# Patient Record
Sex: MALE | Race: WHITE | Employment: FULL TIME | URBAN - METROPOLITAN AREA
[De-identification: names, ages, dates, MRNs, and addresses within clinical notes are randomized per-mention and may not be internally consistent; named-entity substitution may affect disease eponyms.]

---

## 2019-10-14 ENCOUNTER — HOSPITAL ENCOUNTER (EMERGENCY)
Age: 59
Discharge: HOME | End: 2019-10-14
Payer: COMMERCIAL

## 2019-10-14 ENCOUNTER — APPOINTMENT (OUTPATIENT)
Dept: CT IMAGING | Age: 59
End: 2019-10-14
Payer: COMMERCIAL

## 2019-10-14 VITALS
SYSTOLIC BLOOD PRESSURE: 172 MMHG | HEART RATE: 64 BPM | WEIGHT: 200 LBS | BODY MASS INDEX: 30.31 KG/M2 | DIASTOLIC BLOOD PRESSURE: 106 MMHG | TEMPERATURE: 97.7 F | RESPIRATION RATE: 18 BRPM | HEIGHT: 68 IN | OXYGEN SATURATION: 96 %

## 2019-10-14 DIAGNOSIS — J32.0 CHRONIC MAXILLARY SINUSITIS: ICD-10-CM

## 2019-10-14 DIAGNOSIS — S09.90XA CLOSED HEAD INJURY, INITIAL ENCOUNTER: Primary | ICD-10-CM

## 2019-10-14 PROCEDURE — 72125 CT NECK SPINE W/O DYE: CPT

## 2019-10-14 PROCEDURE — 70450 CT HEAD/BRAIN W/O DYE: CPT

## 2019-10-14 PROCEDURE — 99284 EMERGENCY DEPT VISIT MOD MDM: CPT

## 2019-10-14 RX ORDER — DOXYCYCLINE 100 MG/1
100 TABLET ORAL 2 TIMES DAILY
Qty: 20 TABLET | Refills: 0 | Status: SHIPPED | OUTPATIENT
Start: 2019-10-14 | End: 2019-10-24

## 2019-10-14 RX ORDER — FLUTICASONE PROPIONATE 50 MCG
1 SPRAY, SUSPENSION (ML) NASAL DAILY
Qty: 1 BOTTLE | Refills: 0 | Status: SHIPPED | OUTPATIENT
Start: 2019-10-14 | End: 2021-08-17

## 2019-10-14 ASSESSMENT — ENCOUNTER SYMPTOMS
SORE THROAT: 0
PHOTOPHOBIA: 0
TROUBLE SWALLOWING: 0
SINUS PRESSURE: 0
ABDOMINAL PAIN: 0
COUGH: 0
VOMITING: 0
SHORTNESS OF BREATH: 0

## 2019-10-14 ASSESSMENT — PAIN DESCRIPTION - PAIN TYPE: TYPE: ACUTE PAIN

## 2019-10-14 ASSESSMENT — PAIN DESCRIPTION - DESCRIPTORS: DESCRIPTORS: HEADACHE

## 2019-10-14 ASSESSMENT — PAIN SCALES - GENERAL: PAINLEVEL_OUTOF10: 6

## 2021-05-18 PROBLEM — I10 ESSENTIAL HYPERTENSION: Status: ACTIVE | Noted: 2018-07-13

## 2021-05-18 PROBLEM — J44.9 COPD (CHRONIC OBSTRUCTIVE PULMONARY DISEASE) (HCC): Status: ACTIVE | Noted: 2018-07-13

## 2021-05-18 PROBLEM — I25.10 ATHSCL HEART DISEASE OF NATIVE CORONARY ARTERY W/O ANG PCTRS: Status: ACTIVE | Noted: 2018-07-13

## 2021-05-20 ENCOUNTER — OFFICE VISIT (OUTPATIENT)
Dept: FAMILY MEDICINE CLINIC | Age: 61
End: 2021-05-20
Payer: COMMERCIAL

## 2021-05-20 VITALS
HEIGHT: 68 IN | SYSTOLIC BLOOD PRESSURE: 130 MMHG | WEIGHT: 203 LBS | TEMPERATURE: 97 F | HEART RATE: 69 BPM | OXYGEN SATURATION: 97 % | BODY MASS INDEX: 30.77 KG/M2 | RESPIRATION RATE: 16 BRPM | DIASTOLIC BLOOD PRESSURE: 80 MMHG

## 2021-05-20 DIAGNOSIS — I10 ESSENTIAL HYPERTENSION: Primary | ICD-10-CM

## 2021-05-20 DIAGNOSIS — K21.9 GASTROESOPHAGEAL REFLUX DISEASE WITHOUT ESOPHAGITIS: ICD-10-CM

## 2021-05-20 DIAGNOSIS — J44.9 CHRONIC OBSTRUCTIVE PULMONARY DISEASE, UNSPECIFIED COPD TYPE (HCC): ICD-10-CM

## 2021-05-20 DIAGNOSIS — I10 ESSENTIAL HYPERTENSION: ICD-10-CM

## 2021-05-20 LAB
ALBUMIN SERPL-MCNC: 4.9 G/DL (ref 3.5–4.6)
ALP BLD-CCNC: 54 U/L (ref 35–104)
ALT SERPL-CCNC: 17 U/L (ref 0–41)
ANION GAP SERPL CALCULATED.3IONS-SCNC: 9 MEQ/L (ref 9–15)
AST SERPL-CCNC: 23 U/L (ref 0–40)
BASOPHILS ABSOLUTE: 0 K/UL (ref 0–0.2)
BASOPHILS RELATIVE PERCENT: 0.4 %
BILIRUB SERPL-MCNC: 0.5 MG/DL (ref 0.2–0.7)
BUN BLDV-MCNC: 16 MG/DL (ref 8–23)
CALCIUM SERPL-MCNC: 9.9 MG/DL (ref 8.5–9.9)
CHLORIDE BLD-SCNC: 103 MEQ/L (ref 95–107)
CHOLESTEROL, TOTAL: 250 MG/DL (ref 0–199)
CO2: 25 MEQ/L (ref 20–31)
CREAT SERPL-MCNC: 0.88 MG/DL (ref 0.7–1.2)
EOSINOPHILS ABSOLUTE: 0.2 K/UL (ref 0–0.7)
EOSINOPHILS RELATIVE PERCENT: 2.2 %
GFR AFRICAN AMERICAN: >60
GFR NON-AFRICAN AMERICAN: >60
GLOBULIN: 2.5 G/DL (ref 2.3–3.5)
GLUCOSE BLD-MCNC: 90 MG/DL (ref 70–99)
HCT VFR BLD CALC: 45.1 % (ref 42–52)
HDLC SERPL-MCNC: 42 MG/DL (ref 40–59)
HEMOGLOBIN: 15.5 G/DL (ref 14–18)
LDL CHOLESTEROL CALCULATED: 135 MG/DL (ref 0–129)
LYMPHOCYTES ABSOLUTE: 3.1 K/UL (ref 1–4.8)
LYMPHOCYTES RELATIVE PERCENT: 36.6 %
MCH RBC QN AUTO: 31.8 PG (ref 27–31.3)
MCHC RBC AUTO-ENTMCNC: 34.3 % (ref 33–37)
MCV RBC AUTO: 92.7 FL (ref 80–100)
MONOCYTES ABSOLUTE: 0.9 K/UL (ref 0.2–0.8)
MONOCYTES RELATIVE PERCENT: 10.3 %
NEUTROPHILS ABSOLUTE: 4.3 K/UL (ref 1.4–6.5)
NEUTROPHILS RELATIVE PERCENT: 50.5 %
PDW BLD-RTO: 13.1 % (ref 11.5–14.5)
PLATELET # BLD: 273 K/UL (ref 130–400)
POTASSIUM SERPL-SCNC: 4.7 MEQ/L (ref 3.4–4.9)
RBC # BLD: 4.87 M/UL (ref 4.7–6.1)
SODIUM BLD-SCNC: 137 MEQ/L (ref 135–144)
TOTAL PROTEIN: 7.4 G/DL (ref 6.3–8)
TRIGL SERPL-MCNC: 365 MG/DL (ref 0–150)
WBC # BLD: 8.5 K/UL (ref 4.8–10.8)

## 2021-05-20 PROCEDURE — G8926 SPIRO NO PERF OR DOC: HCPCS | Performed by: INTERNAL MEDICINE

## 2021-05-20 PROCEDURE — 3017F COLORECTAL CA SCREEN DOC REV: CPT | Performed by: INTERNAL MEDICINE

## 2021-05-20 PROCEDURE — 1036F TOBACCO NON-USER: CPT | Performed by: INTERNAL MEDICINE

## 2021-05-20 PROCEDURE — G8427 DOCREV CUR MEDS BY ELIG CLIN: HCPCS | Performed by: INTERNAL MEDICINE

## 2021-05-20 PROCEDURE — 99214 OFFICE O/P EST MOD 30 MIN: CPT | Performed by: INTERNAL MEDICINE

## 2021-05-20 PROCEDURE — 3023F SPIROM DOC REV: CPT | Performed by: INTERNAL MEDICINE

## 2021-05-20 PROCEDURE — G8417 CALC BMI ABV UP PARAM F/U: HCPCS | Performed by: INTERNAL MEDICINE

## 2021-05-20 SDOH — ECONOMIC STABILITY: FOOD INSECURITY: WITHIN THE PAST 12 MONTHS, THE FOOD YOU BOUGHT JUST DIDN'T LAST AND YOU DIDN'T HAVE MONEY TO GET MORE.: NEVER TRUE

## 2021-05-20 SDOH — ECONOMIC STABILITY: FOOD INSECURITY: WITHIN THE PAST 12 MONTHS, YOU WORRIED THAT YOUR FOOD WOULD RUN OUT BEFORE YOU GOT MONEY TO BUY MORE.: NEVER TRUE

## 2021-05-20 ASSESSMENT — PATIENT HEALTH QUESTIONNAIRE - PHQ9
SUM OF ALL RESPONSES TO PHQ9 QUESTIONS 1 & 2: 0
SUM OF ALL RESPONSES TO PHQ QUESTIONS 1-9: 0
SUM OF ALL RESPONSES TO PHQ QUESTIONS 1-9: 0
1. LITTLE INTEREST OR PLEASURE IN DOING THINGS: 0
SUM OF ALL RESPONSES TO PHQ QUESTIONS 1-9: 0
2. FEELING DOWN, DEPRESSED OR HOPELESS: 0

## 2021-05-20 ASSESSMENT — ENCOUNTER SYMPTOMS
EYE REDNESS: 0
RECTAL PAIN: 0
CONSTIPATION: 0
ABDOMINAL DISTENTION: 0
COLOR CHANGE: 0
EYE DISCHARGE: 0
SORE THROAT: 0
SINUS PRESSURE: 0
TROUBLE SWALLOWING: 0
PHOTOPHOBIA: 0
COUGH: 0
NAUSEA: 0
VOICE CHANGE: 0
VOMITING: 0
EYE ITCHING: 0
SINUS PAIN: 0
EYE PAIN: 0
CHEST TIGHTNESS: 0
BACK PAIN: 0
BLOOD IN STOOL: 0
APNEA: 0
WHEEZING: 0
RHINORRHEA: 0
ABDOMINAL PAIN: 0
SHORTNESS OF BREATH: 0
FACIAL SWELLING: 0
DIARRHEA: 0

## 2021-05-20 NOTE — PROGRESS NOTES
Subjective:      Patient ID: Lizzie He is a 56 y.o. male New patient, here for evaluation of the following chief complaint(s):  No chief complaint on file. HPI  68-year-old male with a history of hypertension established coronary artery disease and COPD presenting to establish continuity with me as his primary care doctor. Abdominal complaints: abdominal cramping post fatty meals. increased belching. Not responsive to zantac or omeprazole. IMproving with Tumeric. Hypertension: Compliant with Hyzaar 100-25 mg daily            Neuropathy: Compliant with Neurontin 300 mg 2 times daily            GERD: Compliant with Prilosec 40 mg orally daily          Cad: pt scheduled for a heart cath. S/p STENT. At present he denies polyuria,  Polydipsia, constitutional, sinus, visual, cardiopulmonary, urologic, gastrointestinal, immunologic/hematologic, musculoskeletal, neurologic,dermatologic, or psychiatric complaints. TAD: Quit smoking      Review of Systems   Constitutional: Negative for chills, diaphoresis, fatigue and fever. HENT: Negative for congestion, dental problem, drooling, ear discharge, ear pain, facial swelling, hearing loss, mouth sores, nosebleeds, postnasal drip, rhinorrhea, sinus pressure, sinus pain, sneezing, sore throat, tinnitus, trouble swallowing and voice change. Eyes: Negative for photophobia, pain, discharge, redness, itching and visual disturbance. Respiratory: Negative for apnea, cough, chest tightness, shortness of breath and wheezing. Cardiovascular: Negative for chest pain, palpitations and leg swelling. Gastrointestinal: Negative for abdominal distention, abdominal pain, blood in stool, constipation, diarrhea, nausea, rectal pain and vomiting. Endocrine: Negative for cold intolerance, heat intolerance, polydipsia, polyphagia and polyuria.    Genitourinary: Negative for decreased urine volume, difficulty urinating, dysuria, flank pain, frequency, genital sores, hematuria and urgency. Musculoskeletal: Negative for arthralgias, back pain, gait problem, joint swelling, myalgias, neck pain and neck stiffness. Skin: Negative for color change, rash and wound. Allergic/Immunologic: Negative for environmental allergies and food allergies. Neurological: Negative for dizziness, tremors, seizures, syncope, facial asymmetry, speech difficulty, weakness, light-headedness, numbness and headaches. Hematological: Negative for adenopathy. Does not bruise/bleed easily. Psychiatric/Behavioral: Negative for agitation, confusion, decreased concentration, hallucinations, self-injury, sleep disturbance and suicidal ideas. The patient is not nervous/anxious. Objective: There were no vitals taken for this visit. Physical Exam  Constitutional:       General: He is not in acute distress. Appearance: He is well-developed. HENT:      Head: Normocephalic. Right Ear: External ear normal.      Left Ear: External ear normal.   Eyes:      Conjunctiva/sclera: Conjunctivae normal.   Neck:      Vascular: No JVD. Trachea: No tracheal deviation. Cardiovascular:      Rate and Rhythm: Normal rate and regular rhythm. Heart sounds: Normal heart sounds. Pulmonary:      Effort: Pulmonary effort is normal. No respiratory distress. Breath sounds: Normal breath sounds. No wheezing or rales. Chest:      Chest wall: No tenderness. Abdominal:      General: Bowel sounds are normal. There is no distension. Palpations: Abdomen is soft. There is no mass. Tenderness: There is no abdominal tenderness. There is no guarding or rebound. Musculoskeletal:         General: No tenderness or deformity. Cervical back: Neck supple. Comments: Third finger amputated   Skin:     General: Skin is warm and dry. Coloration: Skin is not pale. Findings: No erythema or rash.    Neurological:      Mental Status: He is alert and oriented to person, place, and time. Motor: No abnormal muscle tone. Psychiatric:         Thought Content: Thought content normal.         Judgment: Judgment normal.         Assessment:       Diagnosis Orders   1. Essential hypertension     2. Chronic obstructive pulmonary disease, unspecified COPD type ([facility] [address]     3. Gastroesophageal reflux disease without esophagitis           Plan:      Diagnoses and all orders for this visit:    Essential hypertension    Chronic obstructive pulmonary disease, unspecified COPD type ([facility] [address]    Gastroesophageal reflux disease without esophagitis         Return in about 4 months (around 9/20/2021). On this date 05/20/21 I have spent 30 minutes reviewing previous notes, test results and face to face with the patient discussing the diagnosis and importance of compliance with the treatment plan. Kanwal Campa MD    Please note, this report has been partially produced using speech recognition software  and may cause  and /or contain errors related to that system including grammar, punctuation and spelling as well as words and phrases that may seem inappropriate. If there are questions or concerns please feel free to contact me to clarify.

## 2021-08-16 RX ORDER — LISINOPRIL 40 MG/1
TABLET ORAL
COMMUNITY
Start: 2021-06-28 | End: 2021-08-17

## 2021-08-17 ENCOUNTER — OFFICE VISIT (OUTPATIENT)
Dept: FAMILY MEDICINE CLINIC | Age: 61
End: 2021-08-17
Payer: COMMERCIAL

## 2021-08-17 VITALS
HEIGHT: 68 IN | SYSTOLIC BLOOD PRESSURE: 138 MMHG | WEIGHT: 204 LBS | BODY MASS INDEX: 30.92 KG/M2 | OXYGEN SATURATION: 98 % | HEART RATE: 78 BPM | RESPIRATION RATE: 14 BRPM | DIASTOLIC BLOOD PRESSURE: 90 MMHG

## 2021-08-17 DIAGNOSIS — I10 ESSENTIAL HYPERTENSION: Primary | ICD-10-CM

## 2021-08-17 DIAGNOSIS — M79.671 RIGHT FOOT PAIN: ICD-10-CM

## 2021-08-17 DIAGNOSIS — R73.9 HYPERGLYCEMIA: ICD-10-CM

## 2021-08-17 DIAGNOSIS — K21.9 GASTROESOPHAGEAL REFLUX DISEASE WITHOUT ESOPHAGITIS: ICD-10-CM

## 2021-08-17 LAB — HBA1C MFR BLD: 5.3 %

## 2021-08-17 PROCEDURE — 83036 HEMOGLOBIN GLYCOSYLATED A1C: CPT | Performed by: INTERNAL MEDICINE

## 2021-08-17 PROCEDURE — G8427 DOCREV CUR MEDS BY ELIG CLIN: HCPCS | Performed by: INTERNAL MEDICINE

## 2021-08-17 PROCEDURE — 1036F TOBACCO NON-USER: CPT | Performed by: INTERNAL MEDICINE

## 2021-08-17 PROCEDURE — G8417 CALC BMI ABV UP PARAM F/U: HCPCS | Performed by: INTERNAL MEDICINE

## 2021-08-17 PROCEDURE — 3017F COLORECTAL CA SCREEN DOC REV: CPT | Performed by: INTERNAL MEDICINE

## 2021-08-17 PROCEDURE — 99214 OFFICE O/P EST MOD 30 MIN: CPT | Performed by: INTERNAL MEDICINE

## 2021-08-17 RX ORDER — LISINOPRIL 40 MG/1
TABLET ORAL
Qty: 30 TABLET | Refills: 3 | Status: SHIPPED | OUTPATIENT
Start: 2021-08-17 | End: 2022-01-18

## 2021-08-17 ASSESSMENT — ENCOUNTER SYMPTOMS
BLOOD IN STOOL: 0
DIARRHEA: 0
NAUSEA: 0
RECTAL PAIN: 0
VOMITING: 0
SINUS PRESSURE: 0
EYE ITCHING: 0
BACK PAIN: 0
DIFFICULTY BREATHING: 1
TROUBLE SWALLOWING: 0
COLOR CHANGE: 0
EYE DISCHARGE: 0
COUGH: 0
CHEST TIGHTNESS: 0
SINUS PAIN: 0
APNEA: 0
RHINORRHEA: 0
FACIAL SWELLING: 0
EYE REDNESS: 0
ABDOMINAL PAIN: 0
WHEEZING: 0
CONSTIPATION: 0
SORE THROAT: 0
PHOTOPHOBIA: 0
ABDOMINAL DISTENTION: 0
VOICE CHANGE: 0
EYE PAIN: 0
SHORTNESS OF BREATH: 0

## 2021-08-17 NOTE — TELEPHONE ENCOUNTER
The Cre[facility] ordered does not have  complete directions. Is he to take 2 tabs tid for a total of #180 tabs? Please send new order to [facility] in [facility].

## 2021-08-17 NOTE — PROGRESS NOTES
Subjective:      Patient ID: Kalyan Herring is a 59 y.o. male New patient, here for evaluation of the following chief complaint(s):  Chief Complaint   Patient presents with    Breathing Problem     patient states he has been having some breathing issues and has not use an inhaler  in over 15 years but he would like some help       Breathing Problem  He complains of difficulty breathing. There is no cough, shortness of breath or wheezing. Pertinent negatives include no chest pain, ear pain, fever, headaches, myalgias, postnasal drip, rhinorrhea, sneezing, sore throat or trouble swallowing. 26-year-old male with a history of hypertension established coronary artery disease and COPD presenting to establish continuity with me as his primary care doctor. Abdominal complaints: abdominal cramping post fatty meals. increased belching. Improved with administration of  Pancrelipase        Hypertension: Linsinopril 40 nmg daily continue coreg 12.5        Right foot pain for several week. Worse with ambulation 10/10. Neuropathy: Compliant with Neurontin 300 mg 2 times daily            GERD: Compliant with Prilosec 40 mg orally daily          Cad: pt scheduled for a heart cath. S/p STENT. At present he denies polyuria,  Polydipsia, constitutional, sinus, visual, cardiopulmonary, urologic, gastrointestinal, immunologic/hematologic, musculoskeletal, neurologic,dermatologic, or psychiatric complaints. TAD: Quit smoking      Review of Systems   Constitutional: Negative for chills, diaphoresis, fatigue and fever. HENT: Negative for congestion, dental problem, drooling, ear discharge, ear pain, facial swelling, hearing loss, mouth sores, nosebleeds, postnasal drip, rhinorrhea, sinus pressure, sinus pain, sneezing, sore throat, tinnitus, trouble swallowing and voice change. Eyes: Negative for photophobia, pain, discharge, redness, itching and visual disturbance.    Respiratory: Negative for apnea, cough, chest tightness, shortness of breath and wheezing. Cardiovascular: Negative for chest pain, palpitations and leg swelling. Gastrointestinal: Negative for abdominal distention, abdominal pain, blood in stool, constipation, diarrhea, nausea, rectal pain and vomiting. Endocrine: Negative for cold intolerance, heat intolerance, polydipsia, polyphagia and polyuria. Genitourinary: Negative for decreased urine volume, difficulty urinating, dysuria, flank pain, frequency, genital sores, hematuria and urgency. Musculoskeletal: Negative for arthralgias, back pain, gait problem, joint swelling, myalgias, neck pain and neck stiffness. Skin: Negative for color change, rash and wound. Allergic/Immunologic: Negative for environmental allergies and food allergies. Neurological: Negative for dizziness, tremors, seizures, syncope, facial asymmetry, speech difficulty, weakness, light-headedness, numbness and headaches. Hematological: Negative for adenopathy. Does not bruise/bleed easily. Psychiatric/Behavioral: Negative for agitation, confusion, decreased concentration, hallucinations, self-injury, sleep disturbance and suicidal ideas. The patient is not nervous/anxious. Objective:   BP (!) 138/90   Pulse 78   Resp 14   Ht 5' 8\" (1.727 m)   Wt 204 lb (92.5 kg)   SpO2 98%   BMI 31.02 kg/m²     Physical Exam  Constitutional:       General: He is not in acute distress. Appearance: He is well-developed. HENT:      Head: Normocephalic. Right Ear: External ear normal.      Left Ear: External ear normal.   Eyes:      Conjunctiva/sclera: Conjunctivae normal.   Neck:      Vascular: No JVD. Trachea: No tracheal deviation. Cardiovascular:      Rate and Rhythm: Normal rate and regular rhythm. Heart sounds: Normal heart sounds. Pulmonary:      Effort: Pulmonary effort is normal. No respiratory distress. Breath sounds: Normal breath sounds. No wheezing or rales.    Chest: MD    Please note, this report has been partially produced using speech recognition software  and may cause  and /or contain errors related to that system including grammar, punctuation and spelling as well as words and phrases that may seem inappropriate. If there are questions or concerns please feel free to contact me to clarify.

## 2021-08-18 ENCOUNTER — TELEPHONE (OUTPATIENT)
Dept: FAMILY MEDICINE CLINIC | Age: 61
End: 2021-08-18

## 2021-08-18 NOTE — TELEPHONE ENCOUNTER
Pharmacy got the new order for Pancrelipase, but now is says to dispense 900 tabs. They will need another order to [facility], please.

## 2021-08-23 NOTE — TELEPHONE ENCOUNTER
Pharmacy callilng back for RX clarification for COREG. How many capsules should be in dosage 3X daily?       Nino Jones  [phone]

## 2021-08-26 NOTE — TELEPHONE ENCOUNTER
[facility] called and is still waiting on clarification on the directions for Pancrelipase. They just need to know if the patient is to be taking 1 tablet per day or 3 tablets per day. Please advise, thank you.

## 2021-09-07 RX ORDER — PANCRELIPASE 30000; 6000; 19000 [USP'U]/1; [USP'U]/1; [USP'U]/1
CAPSULE, DELAYED RELEASE PELLETS ORAL
COMMUNITY
Start: 2021-08-17

## 2021-09-28 ENCOUNTER — OFFICE VISIT (OUTPATIENT)
Dept: FAMILY MEDICINE CLINIC | Age: 61
End: 2021-09-28
Payer: COMMERCIAL

## 2021-09-28 VITALS
DIASTOLIC BLOOD PRESSURE: 84 MMHG | RESPIRATION RATE: 14 BRPM | HEART RATE: 82 BPM | OXYGEN SATURATION: 96 % | WEIGHT: 204 LBS | HEIGHT: 68 IN | SYSTOLIC BLOOD PRESSURE: 132 MMHG | BODY MASS INDEX: 30.92 KG/M2

## 2021-09-28 DIAGNOSIS — I10 ESSENTIAL HYPERTENSION: Primary | ICD-10-CM

## 2021-09-28 DIAGNOSIS — I25.10 ATHSCL HEART DISEASE OF NATIVE CORONARY ARTERY W/O ANG PCTRS: ICD-10-CM

## 2021-09-28 DIAGNOSIS — K21.9 GASTROESOPHAGEAL REFLUX DISEASE WITHOUT ESOPHAGITIS: ICD-10-CM

## 2021-09-28 DIAGNOSIS — J44.9 CHRONIC OBSTRUCTIVE PULMONARY DISEASE, UNSPECIFIED COPD TYPE (HCC): ICD-10-CM

## 2021-09-28 PROCEDURE — 99214 OFFICE O/P EST MOD 30 MIN: CPT | Performed by: INTERNAL MEDICINE

## 2021-09-28 PROCEDURE — G8427 DOCREV CUR MEDS BY ELIG CLIN: HCPCS | Performed by: INTERNAL MEDICINE

## 2021-09-28 PROCEDURE — 3017F COLORECTAL CA SCREEN DOC REV: CPT | Performed by: INTERNAL MEDICINE

## 2021-09-28 PROCEDURE — G8417 CALC BMI ABV UP PARAM F/U: HCPCS | Performed by: INTERNAL MEDICINE

## 2021-09-28 PROCEDURE — 3023F SPIROM DOC REV: CPT | Performed by: INTERNAL MEDICINE

## 2021-09-28 PROCEDURE — G8926 SPIRO NO PERF OR DOC: HCPCS | Performed by: INTERNAL MEDICINE

## 2021-09-28 PROCEDURE — 1036F TOBACCO NON-USER: CPT | Performed by: INTERNAL MEDICINE

## 2021-09-28 RX ORDER — SUCRALFATE 1 G/1
TABLET ORAL
COMMUNITY
Start: 2021-09-24

## 2021-09-28 RX ORDER — PANTOPRAZOLE SODIUM 40 MG/1
TABLET, DELAYED RELEASE ORAL
COMMUNITY
Start: 2021-09-24

## 2021-09-28 ASSESSMENT — ENCOUNTER SYMPTOMS
BACK PAIN: 0
FACIAL SWELLING: 0
SINUS PAIN: 0
CHEST TIGHTNESS: 0
RECTAL PAIN: 0
NAUSEA: 0
EYE REDNESS: 0
COUGH: 0
BLOOD IN STOOL: 0
SINUS PRESSURE: 0
EYE ITCHING: 0
SORE THROAT: 0
VOICE CHANGE: 0
APNEA: 0
ABDOMINAL DISTENTION: 0
CONSTIPATION: 0
RHINORRHEA: 0
SHORTNESS OF BREATH: 0
TROUBLE SWALLOWING: 0
DIFFICULTY BREATHING: 1
EYE DISCHARGE: 0
EYE PAIN: 0
VOMITING: 0
WHEEZING: 0
DIARRHEA: 0
PHOTOPHOBIA: 0
ABDOMINAL PAIN: 0
COLOR CHANGE: 0

## 2021-09-28 NOTE — PROGRESS NOTES
Subjective:      Patient ID: Cecil Hector is a 59 y.o. male New patient, here for evaluation of the following chief complaint(s):  Chief Complaint   Patient presents with    Hypertension       Breathing Problem  He complains of difficulty breathing. There is no cough, shortness of breath or wheezing. Pertinent negatives include no chest pain, ear pain, fever, headaches, myalgias, postnasal drip, rhinorrhea, sneezing, sore throat or trouble swallowing. 58-year-old male with a history of hypertension established coronary artery disease and COPD presenting to establish continuity with me as his primary care doctor. Abdominal complaints: Underwent upper endoscopy and was found to have gastric ulcers. He is  presently receiving Prilosec 40 mg orally daily. Pancrelipase        Hypertension: Linsinopril 40 nmg daily and coreg 12.5        Right foot pain: Stable. .         Neuropathy: Compliant with Neurontin 300 mg 2 times daily            GERD: Compliant with Prilosec 40 mg orally daily          Cad: pt scheduled for a heart cath. S/p STENT. At present he denies polyuria,  Polydipsia, constitutional, sinus, visual, cardiopulmonary, urologic, gastrointestinal, immunologic/hematologic, musculoskeletal, neurologic,dermatologic, or psychiatric complaints. TAD: Quit smoking      Review of Systems   Constitutional: Negative for chills, diaphoresis, fatigue and fever. HENT: Negative for congestion, dental problem, drooling, ear discharge, ear pain, facial swelling, hearing loss, mouth sores, nosebleeds, postnasal drip, rhinorrhea, sinus pressure, sinus pain, sneezing, sore throat, tinnitus, trouble swallowing and voice change. Eyes: Negative for photophobia, pain, discharge, redness, itching and visual disturbance. Respiratory: Negative for apnea, cough, chest tightness, shortness of breath and wheezing. Cardiovascular: Negative for chest pain, palpitations and leg swelling. Gastrointestinal: Negative for abdominal distention, abdominal pain, blood in stool, constipation, diarrhea, nausea, rectal pain and vomiting. Endocrine: Negative for cold intolerance, heat intolerance, polydipsia, polyphagia and polyuria. Genitourinary: Negative for decreased urine volume, difficulty urinating, dysuria, flank pain, frequency, genital sores, hematuria and urgency. Musculoskeletal: Negative for arthralgias, back pain, gait problem, joint swelling, myalgias, neck pain and neck stiffness. Skin: Negative for color change, rash and wound. Allergic/Immunologic: Negative for environmental allergies and food allergies. Neurological: Negative for dizziness, tremors, seizures, syncope, facial asymmetry, speech difficulty, weakness, light-headedness, numbness and headaches. Hematological: Negative for adenopathy. Does not bruise/bleed easily. Psychiatric/Behavioral: Negative for agitation, confusion, decreased concentration, hallucinations, self-injury, sleep disturbance and suicidal ideas. The patient is not nervous/anxious. Objective:   /84   Pulse 82   Resp 14   Ht 5' 8\" (1.727 m)   Wt 204 lb (92.5 kg)   SpO2 96%   BMI 31.02 kg/m²     Physical Exam  Constitutional:       General: He is not in acute distress. Appearance: He is well-developed. HENT:      Head: Normocephalic. Right Ear: External ear normal.      Left Ear: External ear normal.   Eyes:      Conjunctiva/sclera: Conjunctivae normal.   Neck:      Vascular: No JVD. Trachea: No tracheal deviation. Cardiovascular:      Rate and Rhythm: Normal rate and regular rhythm. Heart sounds: Normal heart sounds. Pulmonary:      Effort: Pulmonary effort is normal. No respiratory distress. Breath sounds: Normal breath sounds. No wheezing or rales. Chest:      Chest wall: No tenderness. Abdominal:      General: Bowel sounds are normal. There is no distension. Palpations: Abdomen is soft. There is no mass. Tenderness: There is no abdominal tenderness. There is no guarding or rebound. Musculoskeletal:         General: No tenderness or deformity. Cervical back: Neck supple. Comments: Third finger amputated   Skin:     General: Skin is warm and dry. Coloration: Skin is not pale. Findings: No erythema or rash. Neurological:      Mental Status: He is alert and oriented to person, place, and time. Motor: No abnormal muscle tone. Psychiatric:         Thought Content: Thought content normal.         Judgment: Judgment normal.         Assessment:       Diagnosis Orders   1. Essential hypertension     2. Gastroesophageal reflux disease without esophagitis     3. Chronic obstructive pulmonary disease, unspecified COPD type ([address]     4. Athscl heart disease of native coronary artery w/o ang pctrs           Plan:      Alisa Seip was seen today for hypertension. Diagnoses and all orders for this visit:    Essential hypertensioncontinue lisinopril 40 mg orally dailycontinue Prilosecstable. Monitoring    Gastroesophageal reflux disease without esophagitis    Chronic obstructive pulmonary disease, unspecified COPD type ([address]    Athscl heart disease of native coronary artery w/o ang pctrscontinue Effient         Return in about 5 months (around 2/28/2022). On this date 09/28/21 I have spent 30 minutes reviewing previous notes, test results and face to face with the patient discussing the diagnosis and importance of compliance with the treatment plan. Trell Hussein MD    Please note, this report has been partially produced using speech recognition software  and may cause  and /or contain errors related to that system including grammar, punctuation and spelling as well as words and phrases that may seem inappropriate. If there are questions or concerns please feel free to contact me to clarify.

## 2022-01-18 ENCOUNTER — APPOINTMENT (OUTPATIENT)
Dept: GENERAL RADIOLOGY | Age: 62
End: 2022-01-18
Payer: COMMERCIAL

## 2022-01-18 ENCOUNTER — HOSPITAL ENCOUNTER (EMERGENCY)
Age: 62
Discharge: HOME | End: 2022-01-18
Payer: COMMERCIAL

## 2022-01-18 VITALS
SYSTOLIC BLOOD PRESSURE: 190 MMHG | HEART RATE: 75 BPM | BODY MASS INDEX: 30.31 KG/M2 | RESPIRATION RATE: 16 BRPM | TEMPERATURE: 97.4 F | WEIGHT: 200 LBS | HEIGHT: 68 IN | OXYGEN SATURATION: 96 % | DIASTOLIC BLOOD PRESSURE: 78 MMHG

## 2022-01-18 DIAGNOSIS — S49.91XA INJURY OF RIGHT SHOULDER, INITIAL ENCOUNTER: Primary | ICD-10-CM

## 2022-01-18 DIAGNOSIS — I10 ESSENTIAL HYPERTENSION: ICD-10-CM

## 2022-01-18 DIAGNOSIS — R10.31 RIGHT GROIN PAIN: ICD-10-CM

## 2022-01-18 LAB
BILIRUBIN URINE: NEGATIVE
BLOOD, URINE: NEGATIVE
CLARITY: CLEAR
COLOR: YELLOW
GLUCOSE URINE: NEGATIVE MG/DL
KETONES, URINE: NEGATIVE MG/DL
LEUKOCYTE ESTERASE, URINE: NEGATIVE
NITRITE, URINE: NEGATIVE
PH UA: 5.5 (ref 5–9)
PROTEIN UA: NEGATIVE MG/DL
SPECIFIC GRAVITY UA: 1.02 (ref 1–1.03)
URINE REFLEX TO CULTURE: NORMAL
UROBILINOGEN, URINE: 0.2 E.U./DL

## 2022-01-18 PROCEDURE — 6370000000 HC RX 637 (ALT 250 FOR IP)

## 2022-01-18 PROCEDURE — 81003 URINALYSIS AUTO W/O SCOPE: CPT

## 2022-01-18 PROCEDURE — 99283 EMERGENCY DEPT VISIT LOW MDM: CPT

## 2022-01-18 PROCEDURE — 73030 X-RAY EXAM OF SHOULDER: CPT

## 2022-01-18 RX ORDER — ACETAMINOPHEN 500 MG
1000 TABLET ORAL ONCE
Status: COMPLETED | OUTPATIENT
Start: 2022-01-18 | End: 2022-01-18

## 2022-01-18 RX ORDER — LOSARTAN POTASSIUM 50 MG/1
50 TABLET ORAL DAILY
COMMUNITY
End: 2022-02-24

## 2022-01-18 RX ADMIN — ACETAMINOPHEN 1000 MG: 500 TABLET ORAL at 16:43

## 2022-01-18 ASSESSMENT — ENCOUNTER SYMPTOMS
TROUBLE SWALLOWING: 0
SHORTNESS OF BREATH: 0
ABDOMINAL PAIN: 0
ALLERGIC/IMMUNOLOGIC NEGATIVE: 1
EYE PAIN: 0
APNEA: 0
COLOR CHANGE: 0

## 2022-01-18 ASSESSMENT — PAIN SCALES - GENERAL
PAINLEVEL_OUTOF10: 8
PAINLEVEL_OUTOF10: 6

## 2022-01-18 ASSESSMENT — PAIN DESCRIPTION - LOCATION: LOCATION: SHOULDER

## 2022-01-18 ASSESSMENT — PAIN DESCRIPTION - PAIN TYPE: TYPE: ACUTE PAIN

## 2022-01-18 ASSESSMENT — PAIN DESCRIPTION - FREQUENCY: FREQUENCY: CONTINUOUS

## 2022-01-18 ASSESSMENT — PAIN DESCRIPTION - ORIENTATION: ORIENTATION: RIGHT

## 2022-01-18 ASSESSMENT — PAIN DESCRIPTION - ONSET: ONSET: ON-GOING

## 2022-01-18 ASSESSMENT — PAIN DESCRIPTION - DESCRIPTORS: DESCRIPTORS: SHARP

## 2022-01-18 NOTE — ED PROVIDER NOTES
[address] [facility] ED  eMERGENCYdEPARTMENT eNCOUnter      Pt Name: Nelda Guerrero  MRN: [id number]  [place] 1960  Date of evaluation: 1/18/2022  Provider:Lynette Araujo [address] [place] [address]       Chief Complaint   Patient presents with    Shoulder Injury         HISTORY OF PRESENT ILLNESS  (Location/Symptom, Timing/Onset, Context/Setting, Quality, Duration, Modifying Factors, Severity.)   Nelda Guerrero is a 59 y.o. male who presents to the emergency department with right shoulder pain that began after throwing a large trash bag into a garbage can at work prior to arrival.  Patient states \"I thought I tore something\". Patient primarily complaining of pain to the lateral aspect of the right shoulder. Range of motion is intact with pain. Patient also states that he has been having elevated blood pressure for the past several weeks but admits to being under an increased amount of stress as well as in pain recently. Patient also reporting right groin pain that has been ongoing for some time, states while in the ED it is not present, he has had several heart catheterizations through this site and has discussed this groin pain with his cardiologist, reports his cardiologist is following this issue without current recommendations. HPI    Nursing Notes were reviewed and I agree. REVIEW OF SYSTEMS    (2-9 systems for level 4, 10 or more for level 5)     Review of Systems   Constitutional: Negative for diaphoresis and fever. HENT: Negative for hearing loss and trouble swallowing. Eyes: Negative for pain. Respiratory: Negative for apnea and shortness of breath. Cardiovascular: Negative for chest pain. Gastrointestinal: Negative for abdominal pain. Endocrine: Negative. Genitourinary: Negative for hematuria. Musculoskeletal: Negative for neck pain and neck stiffness. Skin: Negative for color change. Allergic/Immunologic: Negative.     Neurological: Negative for dizziness and numbness. Hematological: Negative. Psychiatric/Behavioral: Negative. All other systems reviewed and are negative. Except as noted above the remainder of the review of systems was reviewed and negative. PAST MEDICAL HISTORY     Past Medical History:   Diagnosis Date    Chronic pain     Emphysema (subcutaneous) (surgical) resulting from a procedure     Hypertension     Low back pain          SURGICAL HISTORY     History reviewed. No pertinent surgical history.       CURRENT MEDICATIONS       Discharge Medication List as of 2022  4:57 PM      CONTINUE these medications which have NOT CHANGED    Details   losartan (COZAAR) 50 MG tablet Take 50 mg by mouth dailyHistorical Med      pantoprazole (PROTONIX) 40 MG tablet TAKE 1 TABLET BY MOUTH EVERY DAYHistorical Med      sucralfate (CARAFATE) 1 GM tablet TAKE 1 TABLET BY MOUTH THREE TIMES A DAYHistorical Med      CREON 6000-72367 units delayed release capsule DAWHistorical Med      Pancrelipase, Lip-Prot-Amyl, 1871-6665 units CPEP Take 1 tablet orally daily, Disp-90 capsule, R-1Normal      carvedilol (COREG) 12.5 MG tablet Historical Med      prasugrel (EFFIENT) 10 MG TABS TAKE 1 TABLET BY MOUTH EVERY DAYHistorical Med      omeprazole (PRILOSEC) 40 MG delayed release capsule Historical Med             ALLERGIES     Morphine and Statins    FAMILY HISTORY       Family History   Problem Relation Age of Onset    Arthritis Mother     Heart Disease Mother     Coronary Art Dis Mother     Hypertension Mother           SOCIAL HISTORY       Social History     Socioeconomic History    Marital status:      Spouse name: None    Number of children: None    Years of education: None    Highest education level: None   Occupational History    None   Tobacco Use    Smoking status: Former Smoker     Packs/day: 0.25     Years: 10.00     Pack years: 2.50     Types: Cigarettes     Quit date:      Years since quittin.0    Smokeless tobacco: Never Used    Tobacco comment: 12/07/2001 quit smoking   Vaping Use    Vaping Use: Never used   Substance and Sexual Activity    Alcohol use: Yes     Alcohol/week: 1.0 standard drink     Types: 1 Cans of beer per week     Comment: social     Drug use: Never    Sexual activity: None   Other Topics Concern    None   Social History Narrative    None     Social Determinants of Health     Financial Resource Strain: Low Risk     Difficulty of Paying Living Expenses: Not hard at all   Food Insecurity: No Food Insecurity    Worried About Running Out of Food in the Last Year: Never true    Daria of Food in the Last Year: Never true   Transportation Needs:     Lack of Transportation (Medical): Not on file    Lack of Transportation (Non-Medical):  Not on file   Physical Activity:     Days of Exercise per Week: Not on file    Minutes of Exercise per Session: Not on file   Stress:     Feeling of Stress : Not on file   Social Connections:     Frequency of Communication with Friends and Family: Not on file    Frequency of Social Gatherings with Friends and Family: Not on file    Attends Gnosticism Services: Not on file    Active Member of [address] [place] [address] or Organizations: Not on file    Attends Club or Organization Meetings: Not on file    Marital Status: Not on file   Intimate Partner Violence:     Fear of Current or Ex-Partner: Not on file    Emotionally Abused: Not on file    Physically Abused: Not on file    Sexually Abused: Not on file   Housing Stability:     Unable to Pay for Housing in the Last Year: Not on file    Number of [place] in the Last Year: Not on file    Unstable Housing in the Last Year: Not on file       SCREENINGS           PHYSICAL EXAM    (up to 7 forlevel 4, 8 or more for level 5)     ED Triage Vitals [01/18/22 1507]   BP Temp Temp Source Pulse Resp SpO2 Height Weight   (!) 190/78 97.4 °F (36.3 °C) Temporal 75 16 96 % 5' 8\" (1.727 m) 200 lb (90.7 kg)       Physical Exam  Vitals and nursing note reviewed. Constitutional:       General: He is not in acute distress. Appearance: He is well-developed. He is not diaphoretic. HENT:      Head: Normocephalic and atraumatic. Mouth/Throat:      Pharynx: No oropharyngeal exudate. Eyes:      General: No scleral icterus. Conjunctiva/sclera: Conjunctivae normal.      Pupils: Pupils are equal, round, and reactive to light. Neck:      Trachea: No tracheal deviation. Cardiovascular:      Rate and Rhythm: Normal rate. Heart sounds: Normal heart sounds. Pulmonary:      Effort: Pulmonary effort is normal. No respiratory distress. Breath sounds: Normal breath sounds. Abdominal:      General: Bowel sounds are normal. There is no distension. Palpations: Abdomen is soft. There is no mass. Tenderness: There is no abdominal tenderness. There is no guarding or rebound. Hernia: No hernia is present. Musculoskeletal:         General: No tenderness or deformity. Normal range of motion. Arms:       Cervical back: Normal range of motion and neck supple. Skin:     General: Skin is warm and dry. Findings: No erythema or rash. Neurological:      Mental Status: He is alert and oriented to person, place, and time. Cranial Nerves: No cranial nerve deficit. Motor: No abnormal muscle tone. Psychiatric:         Behavior: Behavior normal.         Thought Content: Thought content normal.         Judgment: Judgment normal.           DIAGNOSTIC RESULTS     RADIOLOGY:   Non-plain film images such as CT, Ultrasound and MRI are read by the radiologist. Plain radiographic images are visualized and preliminarilyinterpreted by IVELISSE Kinney with the below findings:    Negative for acute osseous abnormality    Interpretation per the Radiologist below, if available at the time of this note:    XR SHOULDER RIGHT (MIN 2 VIEWS)   Final Result   There are no acute osseous injuries.              LABS:  Labs symptoms. MDM    PROCEDURES:    Procedures      FINAL IMPRESSION      1. Injury of right shoulder, initial encounter    2. Essential hypertension    3.  Right groin pain          DISPOSITION/PLAN   DISPOSITION Decision To Discharge 01/18/2022 05:10:24 PM      PATIENT REFERRED TO:  Luisa Fuller MD  [address] [place] [address]  [phone]    Schedule an appointment as soon as possible for a visit         DISCHARGE MEDICATIONS:  Discharge Medication List as of 1/18/2022  4:57 PM          (Please note that portions of this note were completed with a voice recognition program.  Efforts were made to edit the dictations but occasionally words are mis-transcribed.)    Hira Paez, [address], [place]  01/18/22 [address] [place], [place]  02/16/22 [facility]

## 2022-01-18 NOTE — ED TRIAGE NOTES
Pt c/o shoulder injury from work, [employer]. 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 Pt states he was throwing bag into trash bin and hurt rt shoulder. Pt is A&OX4, skin intact, afebrile, msps intact, breaths are equal and unlabored.

## 2022-01-19 ENCOUNTER — TELEPHONE (OUTPATIENT)
Dept: FAMILY MEDICINE CLINIC | Age: 62
End: 2022-01-19

## 2022-01-19 NOTE — TELEPHONE ENCOUNTER
[facility] ([facility]) ED Follow up Call    Reason for ED visit:  Shoulder and groin pain          Hi Libia Like , this is Lyndsey from Dr. Carmelo Ferrell office, just calling to see how you are doing after your recent ED visit. Did you receive discharge instructions? Yes  Do you understand the discharge instructions? yes  Did the ED give you any new prescriptions? No: none given  Were you able to fill your prescriptions? No: NA      Do you have one of our red, yellow and green  Zone sheets that help you to determine when you should go to the ED? Yes      Do you need or want to make a follow up appt with your PCP? No    Do you have any further needs in the home i.e. Equipment?   No        FU appts/Provider:    Future Appointments   Date Time Provider Tete Gay   2/2/2022  1:15 PM Liam Odom  [place], [place]

## 2022-01-20 ENCOUNTER — TELEPHONE (OUTPATIENT)
Dept: FAMILY MEDICINE CLINIC | Age: 62
End: 2022-01-20

## 2022-01-20 DIAGNOSIS — R42 VERTIGO: Primary | ICD-10-CM

## 2022-01-20 NOTE — TELEPHONE ENCOUNTER
Called and spoke to Lennox Baldy letting him know that the referral to ENT has been placed and will get a call within a week to schedule.

## 2022-01-20 NOTE — TELEPHONE ENCOUNTER
----- Message from [facility] sent at 1/20/2022  9:53 AM EST -----  Subject: Referral Request    QUESTIONS   Reason for referral request? Needs a referral for an ENT. Has had vertigo   since he had covid a year ago. Has the physician seen you for this condition before? No   Preferred Specialist (if applicable)? Do you already have an appointment scheduled? No  Additional Information for Provider?   ---------------------------------------------------------------------------  --------------  CALL BACK INFO  What is the best way for the office to contact you? OK to leave message on   voicemail  Preferred Call Back Phone Number?  [phone]

## 2022-02-07 ENCOUNTER — HOSPITAL ENCOUNTER (OUTPATIENT)
Dept: MRI IMAGING | Age: 62
Discharge: HOME | End: 2022-02-09
Payer: COMMERCIAL

## 2022-02-07 DIAGNOSIS — S43.401A SPRAIN OF RIGHT SHOULDER, UNSPECIFIED SHOULDER SPRAIN TYPE, INITIAL ENCOUNTER: ICD-10-CM

## 2022-02-07 PROCEDURE — 73221 MRI JOINT UPR EXTREM W/O DYE: CPT

## 2022-02-09 ENCOUNTER — HOSPITAL ENCOUNTER (OUTPATIENT)
Dept: PHYSICAL THERAPY | Age: 62
Setting detail: THERAPIES SERIES
Discharge: HOME | End: 2022-02-09
Payer: COMMERCIAL

## 2022-02-09 PROCEDURE — 97162 PT EVAL MOD COMPLEX 30 MIN: CPT

## 2022-02-09 PROCEDURE — 97110 THERAPEUTIC EXERCISES: CPT

## 2022-02-09 ASSESSMENT — PAIN SCALES - GENERAL: PAINLEVEL_OUTOF10: 5

## 2022-02-09 ASSESSMENT — PAIN DESCRIPTION - LOCATION: LOCATION: SHOULDER

## 2022-02-09 ASSESSMENT — PAIN DESCRIPTION - ONSET: ONSET: SUDDEN

## 2022-02-09 ASSESSMENT — PAIN DESCRIPTION - PROGRESSION: CLINICAL_PROGRESSION: NOT CHANGED

## 2022-02-09 ASSESSMENT — PAIN DESCRIPTION - ORIENTATION: ORIENTATION: RIGHT

## 2022-02-09 ASSESSMENT — PAIN DESCRIPTION - PAIN TYPE: TYPE: ACUTE PAIN

## 2022-02-09 ASSESSMENT — PAIN - FUNCTIONAL ASSESSMENT: PAIN_FUNCTIONAL_ASSESSMENT: PREVENTS OR INTERFERES WITH ALL ACTIVE AND SOME PASSIVE ACTIVITIES

## 2022-02-09 ASSESSMENT — PAIN DESCRIPTION - DESCRIPTORS: DESCRIPTORS: CONSTANT;SHARP;STABBING

## 2022-02-09 ASSESSMENT — PAIN DESCRIPTION - FREQUENCY: FREQUENCY: CONTINUOUS

## 2022-02-09 NOTE — PROGRESS NOTES
[address]  PHYSICAL THERAPY PLAN OF CARE   [address] Te[address]ada, [address] [facility]         Ph: [phone]  Fax: [phone]    [] Certification  [] Recertification [x]  Plan of Care  [] Progress Note [] Discharge      To:  Morrison Habermann      From:  Yas Morales, PT, DPT  Patient: Reynaldo Trinidad     : 1960  Diagnosis: Unspecified sprain of right shoulder     Date: 2022  Treatment Diagnosis: R shoulder pain, decreased R shoulder A/PROM, decreased R UE strength, decreased postural awareness, and decreased fucntional activity tolerance     Progress Report Period from:  2022  to 2022    Total # of Visits to Date: 1   No Show: 0    Canceled Appointment: 0     OBJECTIVE:   Short Term Goals - Time Frame for Short term goals: 2-3 weeks    Goals Current/Discharge status  Met   Short term goal 1: The pt will demonstrate improved postural awareness requiring <25% VC's with exercises  fair [] yes  [x] no     Long Term Goals - Time Frame for Long term goals : 4-6 weeks  Goals Current/ Discharge status Met   Long term goal 1: The pt will be indep/compliant with HEP in order to self-manage symptoms upon D/C ongoing [] yes  [x] no   Long term goal 2: The pt will have a increase in UEFI score >/=10 points in order to increase functional activity tolerance Exam: high; UEFI 52/80   [] yes  [x] no   Long term goal 3: The pt will demo improved R shoulder AROM flex/abd >/=140*, ER >/=T1, IR >/=L1 in order to increase ease with ADL's AROM RUE (degrees)  RUE General AROM: shoulder flex 110, abd 85, ER ear (unable to reach midline), IR greater trochanter (unable to reach midline) [] yes  [x] no   Long term goal 4: The pt will have decreased R shoulder pain </= 4-5/10 at worst in order to increase ease with ADL's   Pain Location: Shoulder    Pain Level: 5 (Pain ranges from 2-8/10)    Pain Descriptors: Constant,Sharp,Stabbing [] yes  [x] no   Long term goal 5:  The pt will demonstrate improved R shoulder strength >/=4/5, elbow strength >/=4+/5, periscapular strength >/=3+/5 in order to lift/carry with decreased pain Strength RUE  Comment: 2-/5 shoulder 4-/5 elbow 3-/5 Lats, MT, rhomboids  Strength LUE  Comment: 4+/5 shoulder flex, abd 5/5 shoulder ER, IR, elbow 4/5 Lats 4-/5 MT, Rhomboids [] yes  [x] no      Body structures, Functions, Activity limitations: Decreased ADL status,Decreased ROM,Decreased strength,Increased pain,Decreased posture,Decreased high-level IADLs  Assessment: Pt presents with onset of R shoulder pain after throwing heavy garbage bag in dumpster at work on 1/18/22. He currently presents with decreased R shoulder A/PROM, decreased R UE strength, decreased postural awareness, and decreased fucntional activity tolerance. These impairments currently limit his fucntional abilities to reach, lift, carry, perform overhead activity, ADL's, household duties, work related duties, or sleep without pain or limitations at [employer]. Prognosis: Good  Discharge Recommendations: Continue to assess pending progress    PT Education: Goals;PT Role;Plan of Care;Home Exercise Program    PLAN: [x] Evaluate and Treat  Frequency/Duration:  Plan  Times per week: 2  Plan weeks: 4-6  Current Treatment Recommendations: Strengthening,ROM,Neuromuscular Re-education,Manual Therapy - Soft Tissue Mobilization,Manual Therapy - Joint Manipulation,Home Exercise Program,Patient/Caregiver Education & Training,Modalities,Positioning,Integrated Dry Needling     Precautions:  HTN                       Patient Status:[x] Continue/ Initiate plan of Care    [] Discharge PT. Recommend pt continue with HEP. [] Additional visits requested, Please re-certify for additional visits:        Signature: Electronically signed by Sheila Galarza PT on 2/9/22 at 3:51 PM EST    If you have any questions or concerns, please don't hesitate to call.   Thank you for your referral.    I have reviewed this plan of care and certify a need for medically necessary rehabilitation services.     Physician Signature:__________________________________________________________  Date:  Please sign and return

## 2022-02-09 NOTE — PROGRESS NOTES
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 Went to the ER that day and had xrays which were (-). Pt also had a recent MRI (see above) though has not seen MD for further results. Pt has intermittent N/T into 4th and 5th digits. Pt was placed in a sling for a week and has continued to work despite pain. Pt also reprots he has had a hx of vertigo for the last few months, going to the MD in a few weeks.   Comments: RTD [facility] 2/10/22    Objective:   Strength RUE  Comment: 2-/5 shoulder 4-/5 elbow 3-/5 Lats, MT, rhomboids  Strength LUE  Comment: 4+/5 shoulder flex, abd 5/5 shoulder ER, IR, elbow 4/5 Lats 4-/5 MT, Rhomboids     PROM RUE (degrees)  RUE General PROM: empty end feels all ranges with pain at end ranges     AROM RUE (degrees)  RUE General AROM: shoulder flex 110, abd 85, ER ear (unable to reach midline), IR greater trochanter (unable to reach midline)  AROM LUE (degrees)  LUE General AROM: shoulder flex 160, abd 145, ER T5, IR T12    Observation/Palpation  Posture: Fair  Palpation: tender to palpate R AC joint, Bicep insertion; mod tightness bicep, pec, periscapulars  Observation: rounded shoulders, FHP, protracted scaps    Additional Measures  Special Tests: NA d/t recent imaging (-) AC compression     Exercises:   Exercises  Exercise 1: wand flex supine 5\"x10  Exercise 2: seated wand ER 5\"x10  Exercise 3: scap retract 5\"x10  Exercise 4: pulleys*  Exercise 5: table slides*  Exercise 6: tband rows/lats*  Exercise 7: S/L ER and abd*  Exercise 20: HEP: wand flex, ER, scap retract  Modalities:  Modalities  Moist heat: PRN*  Cryotherapy (Minutes\Location): PRN-declined this date, will ice at home  E-stim (parameters): PRN*  Manual:  Manual therapy  PROM: R shoulder all planes*  *Indicates exercise,modality, or manual techniques to be initiated when appropriate  Assessment: Body structures, Functions, Activity limitations: Decreased ADL status,Decreased ROM,Decreased strength,Increased pain,Decreased posture,Decreased high-level IADLs  Assessment: Pt presents with onset of R shoulder pain after throwing heavy garbage bag in dumpster at work on 1/18/22. He currently presents with decreased R shoulder A/PROM, decreased R UE strength, decreased postural awareness, and decreased fucntional activity tolerance. These impairments currently limit his fucntional abilities to reach, lift, carry, perform overhead activity, ADL's, household duties, work related duties, or sleep without pain or limitations at [employer]. Prognosis: Good  Discharge Recommendations: Continue to assess pending progress     Decision Making: Medium Complexity  History: med  Exam: high; UEFI 52/80  Clinical Presentation: med      Plan  Frequency/Duration:  Plan  Times per week: 2  Plan weeks: 4-6  Current Treatment Recommendations: Strengthening,ROM,Neuromuscular Re-education,Manual Therapy - Soft Tissue Mobilization,Manual Therapy - Joint Manipulation,Home Exercise Program,Patient/Caregiver Education & Training,Modalities,Positioning,Integrated Dry Needling     Patient Education  New Education Provided: PT Education: Goals;PT Role;Plan of Care;Home Exercise Program    POST-PAIN     Pain Rating (0-10 pain scale):  5 /10  Location and pain description same as pre-treatment unless indicated. Action: [] NA  [] Call Physician  [x] Perform HEP  [x] Meds as prescribed    Evaluation and patient rights have been reviewed and patient agrees with plan of care.   Yes  [x]  No  []   Explain:     Adams Fall Risk Assessment  Risk Factor Scale  Score   History of Falls [] Yes  [x] No 25  0    Secondary Diagnosis [] Yes  [x] No 15  0    Ambulatory Aid [] Furniture  [] Crutches/cane/walker  [x] None/bedrest/wheelchair/nurse 30  15  0    IV/Heparin Lock [] Yes  [x] No 20  0    Gait/Transferring [] Impaired  [] Weak  [x] Normal/bedrest/immobile 20  10  0    Mental Status [] Forgets limitations  [x] Oriented to own ability 15  0       Total: 0     Based on the Assessment score: check the appropriate box. [x]  No intervention needed   Low =   Score of 0-24  []  Use standard prevention interventions Moderate =  Score of 24-44   [] Discuss fall prevention strategies   [] Indicate moderate falls risk on eval  []  Use high risk prevention interventions High = Score of 45 and higher   [] Discuss fall prevention strategies   [] Provide supervision during treatment time    Goals  Short term goals  Time Frame for Short term goals: 2-3 weeks  Short term goal 1: The pt will demonstrate improved postural awareness requiring <25% VC's with exercises  Long term goals  Time Frame for Long term goals : 4-6 weeks  Long term goal 1: The pt will be indep/compliant with HEP in order to self-manage symptoms upon D/C  Long term goal 2: The pt will have a increase in UEFI score >/=10 points in order to increase functional activity tolerance  Long term goal 3: The pt will demo improved R shoulder AROM flex/abd >/=140*, ER >/=T1, IR >/=L1 in order to increase ease with ADL's  Long term goal 4: The pt will have decreased R shoulder pain </= 4-5/10 at worst in order to increase ease with ADL's  Long term goal 5:  The pt will demonstrate improved R shoulder strength >/=4/5, elbow strength >/=4+/5, periscapular strength >/=3+/5 in order to lift/carry with decreased pain    Treatment Initiated : ther ex and hep    PT Individual Minutes  Time In: 1507  Time Out: 700 Tono  Minutes: 38  Timed Code Treatment Minutes: 8 Minutes  Procedure Minutes: 30 eval    Modality Time In Time Out Total Time Units    PT Evaluation: Moderate Complexity (94485) [id number] 30 1   Ther ex (93163) [id number] [id number] 8 1       Electronically signed by Emmy Tang PT on 2/9/22 at 3:52 PM EST

## 2022-02-14 ENCOUNTER — HOSPITAL ENCOUNTER (OUTPATIENT)
Dept: PHYSICAL THERAPY | Age: 62
Setting detail: THERAPIES SERIES
Discharge: HOME | End: 2022-02-14
Payer: COMMERCIAL

## 2022-02-14 NOTE — PROGRESS NOTES
Therapy                            Cancellation/No-show Note      Date:  2022  Patient Name:  Sara Cool  :  1960   MRN:  [id number]  Referring Practitioner: Alfredo Darling  Diagnosis: Unspecified sprain of right shoulder    Visit Information:  PT Visit Information  Onset Date: 22  PT Insurance Information: [employer]-presum[employer]  Total # of Visits Approved: 12  Total # of Visits to Date: 1  No Show: 0  Canceled Appointment: 1  Progress Note Counter: - (through 3/25/22) CX 22    For today's appointment patient:  [x]  Cancelled  []  Rescheduled appointment  []  No-show   []  Called pt to remind of next appointment     Reason given by patient:  []  Patient ill  []  Conflicting appointment  [x]  No transportation    []  Conflict with work  []  No reason given  []  Other:      [x] Pt has future appointments scheduled, no follow up needed  [] Pt requests to be on hold.     Reason:   If > 2 weeks please discuss with therapist.  [] Therapist to call pt for follow up     Comments:       Signature: Electronically signed by Jose Lipscomb PTA on 22 at 1:17 PM EST

## 2022-02-17 ENCOUNTER — HOSPITAL ENCOUNTER (OUTPATIENT)
Dept: PHYSICAL THERAPY | Age: 62
Setting detail: THERAPIES SERIES
Discharge: HOME | End: 2022-02-17
Payer: COMMERCIAL

## 2022-02-17 PROCEDURE — 97110 THERAPEUTIC EXERCISES: CPT

## 2022-02-17 ASSESSMENT — PAIN DESCRIPTION - ORIENTATION: ORIENTATION: RIGHT

## 2022-02-17 ASSESSMENT — PAIN DESCRIPTION - DESCRIPTORS: DESCRIPTORS: CONSTANT;SHARP;STABBING

## 2022-02-17 ASSESSMENT — PAIN SCALES - GENERAL: PAINLEVEL_OUTOF10: 5

## 2022-02-17 ASSESSMENT — PAIN DESCRIPTION - LOCATION: LOCATION: SHOULDER

## 2022-02-17 ASSESSMENT — PAIN DESCRIPTION - PAIN TYPE: TYPE: ACUTE PAIN

## 2022-02-17 NOTE — PROGRESS NOTES
[address] [facility]  Outpatient Physical Therapy    Treatment Note        Date: 2022  Patient: Karlee Benavides  : 1960  ACCT #: [de-identified]  Referring Practitioner: Karla Landa  Diagnosis: Unspecified sprain of right shoulder  Treatment Diagnosis: R shoulder pain, decreased R shoulder A/PROM, decreased R UE strength, decreased postural awareness, and decreased fucntional activity tolerance     Visit Information:  PT Visit Information  Onset Date: 22  PT Insurance Information: [facility]-presumptive  Total # of Visits Approved: 12  Total # of Visits to Date: 2  No Show: 0  Canceled Appointment: 1  Progress Note Counter: - (through 3/25/22)    Subjective: Pt reports continued pain that become sharp and intense at times. Dull ache to sharp stabbing pain with movements. HEP Compliance:  [x] Good [] Fair [] Poor [] Reports not doing due to:    Vital Signs  Patient Currently in Pain: Yes   Pain Screening  Patient Currently in Pain: Yes  Pain Assessment  Pain Assessment: 0-10  Pain Level: 5  Pain Type: Acute pain  Pain Location: Shoulder  Pain Orientation: Right  Pain Descriptors: Constant; Redell Busing; Stabbing    OBJECTIVE:   Exercises  Exercise 1: wand flex supine 5\"x10  Exercise 2: seated wand ER 5\"x10  Exercise 3: scap retract 5\"x10  Exercise 4: pulleys x 4 flexion  Exercise 5: table slides 5s x 10 flexion  Exercise 7: S/L ER and abd x 10 ea. Exercise 8: Supine chest press, flexion , SA Punch ,  ER x 10  Exercise 9: S/L sleeper stretch. 15s x 5       Strength: [x] NT  [] MMT completed:     ROM: [x] NT  [] ROM measurements:        Assessment: Body structures, Functions, Activity limitations: Decreased ADL status,Decreased ROM,Decreased strength,Increased pain,Decreased posture,Decreased high-level IADLs  Assessment: Tx session limited due to Pt requesting to end tx session early due to nausea/vertigoo reported following supine and SL activities on mat.  Pt decllined modalities at this time, will Ice at home, pt requried multiple cues/insturctions to peform today's activities within a tolerable range and to avoid intense pain levels. Treatment Diagnosis: R shoulder pain, decreased R shoulder A/PROM, decreased R UE strength, decreased postural awareness, and decreased fucntional activity tolerance  Prognosis: Good       Goals:  Short term goals  Time Frame for Short term goals: 2-3 weeks  Short term goal 1: The pt will demonstrate improved postural awareness requiring <25% VC's with exercises    Long term goals  Time Frame for Long term goals : 4-6 weeks  Long term goal 1: The pt will be indep/compliant with HEP in order to self-manage symptoms upon D/C  Long term goal 2: The pt will have a increase in UEFI score >/=10 points in order to increase functional activity tolerance  Long term goal 3: The pt will demo improved R shoulder AROM flex/abd >/=140*, ER >/=T1, IR >/=L1 in order to increase ease with ADL's  Long term goal 4: The pt will have decreased R shoulder pain </= 4-5/10 at worst in order to increase ease with ADL's  Long term goal 5: The pt will demonstrate improved R shoulder strength >/=4/5, elbow strength >/=4+/5, periscapular strength >/=3+/5 in order to lift/carry with decreased pain  Progress toward goals: limited by pain, need continue education on performing activities within tolerable ranges     POST-PAIN       Pain Rating (0-10 pain scale):   6/10   Location and pain description same as pre-treatment unless indicated.    Action: [] NA   [x] Perform HEP  [] Meds as prescribed  [] Modalities as prescribed   [] Call Physician     Frequency/Duration:  Plan  Times per week: 2  Plan weeks: 4-6  Current Treatment Recommendations: Strengthening,ROM,Neuromuscular Re-education,Manual Therapy - Soft Tissue Mobilization,Manual Therapy - Joint Manipulation,Home Exercise Program,Patient/Caregiver Education & Training,Modalities,Positioning,Integrated Dry Needling     Pt to continue current HEP. See objective section for any therapeutic exercise changes, additions or modifications this date.          PT Individual Minutes  Time In: 9258  Time Out: 1610  Minutes: 27  Timed Code Treatment Minutes: 27 Minutes  Procedure Minutes: 0     Modality Time In Time Out Total Minutes Units    Ther ex (10189) [phone]     Signature:  Electronically signed by Regan Mcgregor PTA on 2/17/22 at 4:52 PM EST

## 2022-02-21 ENCOUNTER — HOSPITAL ENCOUNTER (OUTPATIENT)
Dept: PHYSICAL THERAPY | Age: 62
Setting detail: THERAPIES SERIES
Discharge: HOME | End: 2022-02-21
Payer: COMMERCIAL

## 2022-02-21 NOTE — PROGRESS NOTES
Therapy                            Cancellation/No-show Note      Date:  2022  Patient Name:  Nelda Guerrero  :  1960   MRN:  [id number]  Referring Practitioner: Randi Yost  Diagnosis: Unspecified sprain of right shoulder    Visit Information:  PT Visit Information  Onset Date: 22  PT Insurance Information: [employer]-presumptive  Total # of Visits Approved: 12  Total # of Visits to Date: 2  No Show: 0  Canceled Appointment: 2  Progress Note Counter:  (through 3/25/22) CX 22    For today's appointment patient:  [x]  Cancelled  []  Rescheduled appointment  []  No-show   []  Called pt to remind of next appointment     Reason given by patient:  []  Patient ill  []  Conflicting appointment  [x]  No transportation    []  Conflict with work  []  No reason given  []  Other:      [] Pt has future appointments scheduled, no follow up needed  [] Pt requests to be on hold.     Reason:   If > 2 weeks please discuss with therapist.  [] Therapist to call pt for follow up     Comments:   Pt called and canceled appointment due to car in the shop, no transportation     Signature: Electronically signed by Kodi Penaloza PTA on 22 at 11:44 AM EST

## 2022-02-22 ENCOUNTER — OFFICE VISIT (OUTPATIENT)
Dept: ORTHOPEDIC SURGERY | Age: 62
End: 2022-02-22
Payer: COMMERCIAL

## 2022-02-22 VITALS
BODY MASS INDEX: 30.31 KG/M2 | HEIGHT: 68 IN | HEART RATE: 74 BPM | TEMPERATURE: 97 F | OXYGEN SATURATION: 98 % | WEIGHT: 200 LBS

## 2022-02-22 DIAGNOSIS — M25.511 ACUTE PAIN OF RIGHT SHOULDER: ICD-10-CM

## 2022-02-22 DIAGNOSIS — S46.011A TRAUMATIC INCOMPLETE TEAR OF RIGHT ROTATOR CUFF, INITIAL ENCOUNTER: Primary | ICD-10-CM

## 2022-02-22 PROCEDURE — 99215 OFFICE O/P EST HI 40 MIN: CPT | Performed by: ORTHOPAEDIC SURGERY

## 2022-02-22 PROCEDURE — 20610 DRAIN/INJ JOINT/BURSA W/O US: CPT | Performed by: ORTHOPAEDIC SURGERY

## 2022-02-22 RX ORDER — ASPIRIN 81 MG/1
TABLET ORAL
COMMUNITY

## 2022-02-22 RX ORDER — LANOLIN ALCOHOL/MO/W.PET/CERES
CREAM (GRAM) TOPICAL
COMMUNITY

## 2022-02-22 RX ORDER — METHYLPREDNISOLONE ACETATE 80 MG/ML
80 INJECTION, SUSPENSION INTRA-ARTICULAR; INTRALESIONAL; INTRAMUSCULAR; SOFT TISSUE ONCE
Status: COMPLETED | OUTPATIENT
Start: 2022-02-22 | End: 2022-02-22

## 2022-02-22 RX ORDER — LOSARTAN POTASSIUM 100 MG/1
TABLET ORAL
COMMUNITY
Start: 2022-01-22

## 2022-02-22 RX ORDER — LIDOCAINE HYDROCHLORIDE 10 MG/ML
2 INJECTION, SOLUTION INFILTRATION; PERINEURAL ONCE
Status: COMPLETED | OUTPATIENT
Start: 2022-02-22 | End: 2022-02-22

## 2022-02-22 RX ORDER — NITROGLYCERIN 0.4 MG/1
TABLET SUBLINGUAL
COMMUNITY

## 2022-02-22 RX ADMIN — METHYLPREDNISOLONE ACETATE 80 MG: 80 INJECTION, SUSPENSION INTRA-ARTICULAR; INTRALESIONAL; INTRAMUSCULAR; SOFT TISSUE at 14:13

## 2022-02-22 RX ADMIN — LIDOCAINE HYDROCHLORIDE 2 ML: 10 INJECTION, SOLUTION INFILTRATION; PERINEURAL at 14:12

## 2022-02-22 NOTE — PATIENT INSTRUCTIONS
Diagnosis:  Right rotator cuff tear (infraspinatus tendon)    Recommendations:  -You received steroid injection of the right shoulder today. You should notice initial benefit over the next 2 to 3 days. Injection may be repeated in another 6-8 weeks if needed.  -Continue acetaminophen (Tylenol) 650 mg by mouth every 6 hours as needed to control pain. -May apply ice packs to right shoulder for 15-20 minutes every 4 hours while awake, as needed to control inflammation/swelling.  -Limit overhead lifting, reaching, pushing, and pulling activities. Attempt to perform activities at waist level as able. -Continue with physical therapy for education on home exercise program for your right rotator cuff. -Follow-up with Dr. David Montelongo in 6-8 weeks for reassessment of right shoulder (rotator cuff tear). [phone].

## 2022-02-22 NOTE — PROGRESS NOTES
Subjective:      Patient ID: Unique Resendez is a 59 y.o. male who presents today for:  Chief Complaint   Patient presents with    Shoulder Pain     The patient c/o right shoulder pain. An MRI was completed on 22. The patienr states that he was injured at work while throwing a heavy waste bag over his head. He states that overhead activity causes him pain. HPI:    The patient is a very pleasant 78-year-old gentleman who presents for evaluation of right shoulder injury sustained 4-5 weeks ago while at work. He notes that he was lifting a heavy trash bag and throwing this into a dumpster and had sudden onset of anterior and posterior shoulder discomfort. Continues to have pain despite treatment with activity modification and acetaminophen. Underwent prior evaluation with plain radiographs as well as MRI of the right shoulder which demonstrated an infraspinatus tear at 5 mm. Notes that he is able to perform activities at waist level and has specific difficulty reaching behind his back as well as with overhead lifting and reaching. No numbness or tingling. Does have history of prior cervical laminectomy and has limited cervical range of motion at baseline. Currently rates pain 4/10, described as dull, aggravated by lifting, pushing, pulling activities. Past Medical History:   Diagnosis Date    Chronic pain     Emphysema (subcutaneous) (surgical) resulting from a procedure     Hypertension     Low back pain      No past surgical history on file.   Social History     Socioeconomic History    Marital status:      Spouse name: Not on file    Number of children: Not on file    Years of education: Not on file    Highest education level: Not on file   Occupational History    Not on file   Tobacco Use    Smoking status: Former Smoker     Packs/day: 0.25     Years: 10.00     Pack years: 2.50     Types: Cigarettes     Quit date:      Years since quittin.1    Smokeless tobacco: Never Used    Tobacco comment: 12/07/2001 quit smoking   Vaping Use    Vaping Use: Never used   Substance and Sexual Activity    Alcohol use: Yes     Alcohol/week: 1.0 standard drink     Types: 1 Cans of beer per week     Comment: social     Drug use: Never    Sexual activity: Not on file   Other Topics Concern    Not on file   Social History Narrative    Not on file     Social Determinants of Health     Financial Resource Strain: Low Risk     Difficulty of Paying Living Expenses: Not hard at all   Food Insecurity: No Food Insecurity    Worried About [facility] in the Last Year: Never true    [facility] in the Last Year: Never true   Transportation Needs:     Lack of Transportation (Medical): Not on file    Lack of Transportation (Non-Medical):  Not on file   Physical Activity:     Days of Exercise per Week: Not on file    Minutes of Exercise per Session: Not on file   Stress:     Feeling of Stress : Not on file   Social Connections:     Frequency of Communication with Friends and Family: Not on file    Frequency of Social Gatherings with Friends and Family: Not on file    Attends Christian Services: Not on file    Active Member of [address] [place] [address] or Organizations: Not on file    Attends Club or Organization Meetings: Not on file    Marital Status: Not on file   Intimate Partner Violence:     Fear of Current or Ex-Partner: Not on file    Emotionally Abused: Not on file    Physically Abused: Not on file    Sexually Abused: Not on file   Housing Stability:     Unable to Pay for Housing in the Last Year: Not on file    Number of [place] in the Last Year: Not on file    Unstable Housing in the Last Year: Not on file     Family History   Problem Relation Age of Onset    Arthritis Mother     Heart Disease Mother     Coronary Art Dis Mother     Hypertension Mother      Allergies   Allergen Reactions    Clopidogrel     Ezetimibe     Morphine     Other     Statins Other (See Comments) Leg cramps      Current Outpatient Medications on File Prior to Visit   Medication Sig Dispense Refill    aspirin (ASPIR-LOW) 81 MG EC tablet       losartan (COZAAR) 100 MG tablet TAKE 1 TABLET BY MOUTH EVERY DAY      melatonin 3 MG TABS tablet Take by mouth      nitroGLYCERIN (NITROSTAT) 0.4 MG SL tablet Place under the tongue      losartan (COZAAR) 50 MG tablet Take 50 mg by mouth daily      pantoprazole (PROTONIX) 40 MG tablet TAKE 1 TABLET BY MOUTH EVERY DAY      sucralfate (CARAFATE) 1 GM tablet TAKE 1 TABLET BY MOUTH THREE TIMES A DAY      CREON 6000-32933 units delayed release capsule       Pancrelipase, Lip-Prot-Amyl, 2121-4003 units CPEP Take 1 tablet orally daily 90 capsule 1    carvedilol (COREG) 12.5 MG tablet       prasugrel (EFFIENT) 10 MG TABS TAKE 1 TABLET BY MOUTH EVERY DAY      omeprazole (PRILOSEC) 40 MG delayed release capsule        No current facility-administered medications on file prior to visit. Acute and Chronic Pain Monitoring:   No flowsheet data found. Objective--Physical Examination:     Pulse 74   Temp 97 °F (36.1 °C) (Temporal)   Ht 5' 8\" (1.727 m)   Wt 200 lb (90.7 kg)   SpO2 98%   BMI 30.41 kg/m²     Physical Examination:  Pleasant 64-year-old male in mild to moderate distress, alert and cooperative. Cervical range of motion is well-tolerated albeit with limited lateral flexion to the left and right side, as per his baseline. Negative Lhermitte and Spurling maneuvers. No torticollis. Examination of the right upper extremity reveals that he is able to actively forward flex the shoulder to 150 degrees with 4+/5 strength and pain against resistance. Abduction right shoulder to 90 degrees with 5/5 strength. He internally rotates to L4. Negative belly press test.  Fires anterior, posterior, lateral heads of the right deltoid. No scapular winging or atrophy. Monroe and Neer's tests are associated with discomfort.   Crossarm test also reproduces mild pain. Sensory intact light touch over the lateral aspect of the shoulder as well as in the radial, median, ulnar nerve distributions. Radial pulse 2+ with capillary refill less than 2 seconds.  strength, finger abduction, EPL strength are 5/5. Radiographs and Laboratory Studies:     Diagnostic Imaging Studies:    I have independently reviewed MRI of the right shoulder from 2/7/2022 which demonstrates an supraspinatus tear at the foot print, measured 5 mm and incomplete. There is minimal retraction of the infraspinatus at this level. Superior glenoid labral changes consistent with degeneration. No significant osteoarthritis. No recent or remote fracture. Laboratory Studies:   Lab Results   Component Value Date    WBC 8.5 05/20/2021    HGB 15.5 05/20/2021    HCT 45.1 05/20/2021    MCV 92.7 05/20/2021     05/20/2021     No results found for: SEDRATE  No results found for: CRP    Assessment / Plan:      Diagnosis Orders   1. Traumatic incomplete tear of right rotator cuff, initial encounter  SC ARTHROCENTESIS ASPIR&/INJ MAJOR JT/BURSA W/O US    lidocaine 1 % injection 2 mL    methylPREDNISolone acetate (DEPO-MEDROL) injection 80 mg      Orders Placed This Encounter   Procedures    SC ARTHROCENTESIS ASPIR&/INJ MAJOR JT/BURSA W/O US     Orders Placed This Encounter   Medications    lidocaine 1 % injection 2 mL    methylPREDNISolone acetate (DEPO-MEDROL) injection 80 mg       -Subacute tear of right shoulder (rotator cuff) with associated pain: He is unable to take oral anti-inflammatories given chronic anticoagulation with Effient. Discussed corticosteroid injection as a palliative measure and he was agreeable to that. May continue with Tylenol to mitigate discomfort as it occurs. Would have him focus on waist level activities and continue with physical therapy to work on rotator cuff strengthening. May ice intermittently.     Procedures Performed Today:     Procedure: Subacromial steroid injection of right shoulder: The risks, benefits, alternatives, procedure, and convalescence for corticosteroid injection of right shoulder subacromial bursa was discussed with patient. The risk discussed included, but were not limited to, infection, limited efficacy, blanching of the skin, localized atrophy of the adipose and muscle tissue, localized contusion and swelling, and persistent or recurrent discomfort. Patient verbalized understanding and agreed to proceed after all questions were answered. The patient was identified by name and date of birth, as well as confirmation of planned procedure prior to injection. The patient was injected in seated position with the right upper arm adducted against the torso. The lateral and posterolateral aspects of the shoulder were prepped with Betadine solution. Topical cold spray was applied at the planned injection site. An injection consisting of 1 mL of Depo-Medrol and 2 mL of 1% lidocaine was administered from an entry point along posterior lateral extent of the acromion using a syringe and a 21-gauge needle directed toward the coracoid. Hemostasis was achieved using direct pressure, and the injection site was cleansed with alcohol pads and a bandage applied. This was well-tolerated by the patient, and the patient was neurovascularly intact following the procedure. Follow-up (with Radiographs) / Referral:     The patient will follow up in 6-8 weeks for clinical and radiographic assessment of right rotator cuff tear (infraspinatus tendon). Radiographs do not need to be updated.     Tamra Perez MD  Orthopaedic Surgery

## 2022-02-22 NOTE — PROGRESS NOTES
Patient's name, date of birth, and allergies have been confirmed. Patient is aware that injection is to be given in Right shoulder. He/she is aware that they will be seeing Dr. Vito Willis and the injection will be given by him. A timeout was performed immediately prior to the start of the injection procedure and included the correct patient (two identifiers), correct procedure and correct site(s). Procedure consent and allergies were also verified.

## 2022-02-24 ENCOUNTER — HOSPITAL ENCOUNTER (OUTPATIENT)
Dept: PHYSICAL THERAPY | Age: 62
Setting detail: THERAPIES SERIES
Discharge: HOME | End: 2022-02-24
Payer: COMMERCIAL

## 2022-02-24 ENCOUNTER — OFFICE VISIT (OUTPATIENT)
Dept: FAMILY MEDICINE CLINIC | Age: 62
End: 2022-02-24
Payer: COMMERCIAL

## 2022-02-24 VITALS
DIASTOLIC BLOOD PRESSURE: 90 MMHG | SYSTOLIC BLOOD PRESSURE: 130 MMHG | HEART RATE: 67 BPM | HEIGHT: 68 IN | RESPIRATION RATE: 14 BRPM | BODY MASS INDEX: 31.98 KG/M2 | WEIGHT: 211 LBS | OXYGEN SATURATION: 97 %

## 2022-02-24 DIAGNOSIS — I25.10 ATHSCL HEART DISEASE OF NATIVE CORONARY ARTERY W/O ANG PCTRS: ICD-10-CM

## 2022-02-24 DIAGNOSIS — R42 VERTIGO: Primary | ICD-10-CM

## 2022-02-24 DIAGNOSIS — R73.9 HYPERGLYCEMIA: ICD-10-CM

## 2022-02-24 DIAGNOSIS — R35.1 NOCTURIA: ICD-10-CM

## 2022-02-24 DIAGNOSIS — J44.9 CHRONIC OBSTRUCTIVE PULMONARY DISEASE, UNSPECIFIED COPD TYPE (HCC): ICD-10-CM

## 2022-02-24 DIAGNOSIS — R25.9 INVOLUNTARY MOVEMENTS: ICD-10-CM

## 2022-02-24 DIAGNOSIS — M79.671 RIGHT FOOT PAIN: ICD-10-CM

## 2022-02-24 DIAGNOSIS — K21.9 GASTROESOPHAGEAL REFLUX DISEASE WITHOUT ESOPHAGITIS: ICD-10-CM

## 2022-02-24 DIAGNOSIS — I10 ESSENTIAL HYPERTENSION: ICD-10-CM

## 2022-02-24 PROCEDURE — G8417 CALC BMI ABV UP PARAM F/U: HCPCS | Performed by: INTERNAL MEDICINE

## 2022-02-24 PROCEDURE — G8427 DOCREV CUR MEDS BY ELIG CLIN: HCPCS | Performed by: INTERNAL MEDICINE

## 2022-02-24 PROCEDURE — 3017F COLORECTAL CA SCREEN DOC REV: CPT | Performed by: INTERNAL MEDICINE

## 2022-02-24 PROCEDURE — 3023F SPIROM DOC REV: CPT | Performed by: INTERNAL MEDICINE

## 2022-02-24 PROCEDURE — 99214 OFFICE O/P EST MOD 30 MIN: CPT | Performed by: INTERNAL MEDICINE

## 2022-02-24 PROCEDURE — G8484 FLU IMMUNIZE NO ADMIN: HCPCS | Performed by: INTERNAL MEDICINE

## 2022-02-24 PROCEDURE — 1036F TOBACCO NON-USER: CPT | Performed by: INTERNAL MEDICINE

## 2022-02-24 RX ORDER — MECLIZINE HYDROCHLORIDE 25 MG/1
25 TABLET ORAL 3 TIMES DAILY PRN
Qty: 30 TABLET | Refills: 0 | Status: SHIPPED | OUTPATIENT
Start: 2022-02-24 | End: 2022-03-06

## 2022-02-24 RX ORDER — TAMSULOSIN HYDROCHLORIDE 0.4 MG/1
0.4 CAPSULE ORAL DAILY
Qty: 30 CAPSULE | Refills: 0 | Status: SHIPPED | OUTPATIENT
Start: 2022-02-24 | End: 2022-06-17

## 2022-02-24 ASSESSMENT — ENCOUNTER SYMPTOMS
SINUS PAIN: 0
CHEST TIGHTNESS: 0
DIARRHEA: 0
SINUS PRESSURE: 0
EYE ITCHING: 0
PHOTOPHOBIA: 0
EYE REDNESS: 0
BLOOD IN STOOL: 0
CONSTIPATION: 0
ABDOMINAL PAIN: 0
BACK PAIN: 0
WHEEZING: 0
EYE DISCHARGE: 0
SORE THROAT: 0
DIFFICULTY BREATHING: 1
VOICE CHANGE: 0
EYE PAIN: 0
COUGH: 0
SHORTNESS OF BREATH: 0
TROUBLE SWALLOWING: 0
RECTAL PAIN: 0
APNEA: 0
RHINORRHEA: 0
NAUSEA: 0
COLOR CHANGE: 0
FACIAL SWELLING: 0
ABDOMINAL DISTENTION: 0
VOMITING: 0

## 2022-02-24 ASSESSMENT — PATIENT HEALTH QUESTIONNAIRE - PHQ9
6. FEELING BAD ABOUT YOURSELF - OR THAT YOU ARE A FAILURE OR HAVE LET YOURSELF OR YOUR FAMILY DOWN: 0
SUM OF ALL RESPONSES TO PHQ QUESTIONS 1-9: 0
SUM OF ALL RESPONSES TO PHQ QUESTIONS 1-9: 0
5. POOR APPETITE OR OVEREATING: 0
2. FEELING DOWN, DEPRESSED OR HOPELESS: 0
SUM OF ALL RESPONSES TO PHQ QUESTIONS 1-9: 0
3. TROUBLE FALLING OR STAYING ASLEEP: 0
4. FEELING TIRED OR HAVING LITTLE ENERGY: 0
10. IF YOU CHECKED OFF ANY PROBLEMS, HOW DIFFICULT HAVE THESE PROBLEMS MADE IT FOR YOU TO DO YOUR WORK, TAKE CARE OF THINGS AT HOME, OR GET ALONG WITH OTHER PEOPLE: 0
SUM OF ALL RESPONSES TO PHQ9 QUESTIONS 1 & 2: 0
7. TROUBLE CONCENTRATING ON THINGS, SUCH AS READING THE NEWSPAPER OR WATCHING TELEVISION: 0
9. THOUGHTS THAT YOU WOULD BE BETTER OFF DEAD, OR OF HURTING YOURSELF: 0
1. LITTLE INTEREST OR PLEASURE IN DOING THINGS: 0
8. MOVING OR SPEAKING SO SLOWLY THAT OTHER PEOPLE COULD HAVE NOTICED. OR THE OPPOSITE, BEING SO FIGETY OR RESTLESS THAT YOU HAVE BEEN MOVING AROUND A LOT MORE THAN USUAL: 0
SUM OF ALL RESPONSES TO PHQ QUESTIONS 1-9: 0

## 2022-02-24 NOTE — PROGRESS NOTES
Subjective:      Patient ID: Jazmyn Rodriguez is a 59 y.o. male New patient, here for evaluation of the following chief complaint(s):  Chief Complaint   Patient presents with    Hypertension    COPD       Breathing Problem  He complains of difficulty breathing. There is no cough, shortness of breath or wheezing. Pertinent negatives include no chest pain, ear pain, fever, headaches, myalgias, postnasal drip, rhinorrhea, sneezing, sore throat or trouble swallowing. 58-year-old male with a history of hypertension established coronary artery disease and COPD presenting with a complaint of vertigo      Vertigothe patient reports that he is experiencing vertigo on a daily basis. He notes it most prominently with change in position of his head. He denies orthostasis. He has noted abnormal involuntary movements of his left hand. Abdominal complaints: Underwent upper endoscopy and was found to have gastric ulcers. He is  presently receiving Prilosec 40 mg orally daily. Pancrelipase        Hypertension: Linsinopril 40 nmg daily and coreg 12.5        Right foot pain: Stable. .         Neuropathy: Compliant with Neurontin 300 mg 2 times daily            GERD: Compliant with Prilosec 40 mg orally daily          Cad: pt scheduled for a heart cath. S/p STENT. Nocturia: The patient reports that he is going to the bathroom frequently at night. At present he denies polyuria,  Polydipsia, constitutional, sinus, visual, cardiopulmonary, urologic, gastrointestinal, immunologic/hematologic, musculoskeletal, neurologic,dermatologic, or psychiatric complaints. TAD: Quit smoking      Review of Systems   Constitutional: Negative for chills, diaphoresis, fatigue and fever.    HENT: Negative for congestion, dental problem, drooling, ear discharge, ear pain, facial swelling, hearing loss, mouth sores, nosebleeds, postnasal drip, rhinorrhea, sinus pressure, sinus pain, sneezing, sore throat, tinnitus, trouble swallowing and voice change. Eyes: Negative for photophobia, pain, discharge, redness, itching and visual disturbance. Respiratory: Negative for apnea, cough, chest tightness, shortness of breath and wheezing. Cardiovascular: Negative for chest pain, palpitations and leg swelling. Gastrointestinal: Negative for abdominal distention, abdominal pain, blood in stool, constipation, diarrhea, nausea, rectal pain and vomiting. Endocrine: Negative for cold intolerance, heat intolerance, polydipsia, polyphagia and polyuria. Genitourinary: Negative for decreased urine volume, difficulty urinating, dysuria, flank pain, frequency, genital sores, hematuria and urgency. Musculoskeletal: Negative for arthralgias, back pain, gait problem, joint swelling, myalgias, neck pain and neck stiffness. Skin: Negative for color change, rash and wound. Allergic/Immunologic: Negative for environmental allergies and food allergies. Neurological: Negative for dizziness, tremors, seizures, syncope, facial asymmetry, speech difficulty, weakness, light-headedness, numbness and headaches. Hematological: Negative for adenopathy. Does not bruise/bleed easily. Psychiatric/Behavioral: Negative for agitation, confusion, decreased concentration, hallucinations, self-injury, sleep disturbance and suicidal ideas. The patient is not nervous/anxious. Objective:   BP (!) 130/90   Pulse 67   Resp 14   Ht 5' 8\" (1.727 m)   Wt 211 lb (95.7 kg)   SpO2 97%   BMI 32.08 kg/m²     Physical Exam  Constitutional:       General: He is not in acute distress. Appearance: He is well-developed. HENT:      Head: Normocephalic. Right Ear: External ear normal.      Left Ear: External ear normal.   Eyes:      Conjunctiva/sclera: Conjunctivae normal.   Neck:      Vascular: No JVD. Trachea: No tracheal deviation. Cardiovascular:      Rate and Rhythm: Normal rate and regular rhythm. Heart sounds: Normal heart sounds. Pulmonary:      Effort: Pulmonary effort is normal. No respiratory distress. Breath sounds: Normal breath sounds. No wheezing or rales. Chest:      Chest wall: No tenderness. Abdominal:      General: Bowel sounds are normal. There is no distension. Palpations: Abdomen is soft. There is no mass. Tenderness: There is no abdominal tenderness. There is no guarding or rebound. Musculoskeletal:         General: No tenderness or deformity. Cervical back: Neck supple. Comments: Third finger amputated   Skin:     General: Skin is warm and dry. Coloration: Skin is not pale. Findings: No erythema or rash. Neurological:      Mental Status: He is alert and oriented to person, place, and time. Motor: No abnormal muscle tone. Psychiatric:         Thought Content: Thought content normal.         Judgment: Judgment normal.         Assessment:       Diagnosis Orders   1. Vertigo  Hector Bansal MD, Neurology, [place]    Comprehensive Metabolic Panel    CBC with Auto Differential   2. Essential hypertension     3. Gastroesophageal reflux disease without esophagitis     4. Chronic obstructive pulmonary disease, unspecified COPD type ([address]     5. Athscl heart disease of native coronary artery w/o ang pctrs     6. Right foot pain     7. Hyperglycemia     8. Nocturia  PSA Screening   9. Involuntary movements  CT HEAD WO CONTRAST         Plan:      Yazmin Crouch was seen today for hypertension. Diagnoses and all orders for this visit:  Vertigotrial of meclizine, CT scan of the brain given history of involuntary movements and referral to neurology. Nocturiatrial of Flomax    Essential hypertensioncontinue lisinopril 40 mg orally dailycontinue Prilosecstable.   Monitoring    Gastroesophageal reflux disease without esophagitisfollow-up with gastroenterology    Chronic obstructive pulmonary disease, unspecified COPD type ([address]    Athscl heart disease of native coronary artery w/o ang pctrscontinue Effient         Return in about 2 months (around 4/24/2022). On this date 02/24/22 I have spent 30 minutes reviewing previous notes, test results and face to face with the patient discussing the diagnosis and importance of compliance with the treatment plan. Winston Summers MD    Please note, this report has been partially produced using speech recognition software  and may cause  and /or contain errors related to that system including grammar, punctuation and spelling as well as words and phrases that may seem inappropriate. If there are questions or concerns please feel free to contact me to clarify.

## 2022-02-28 ENCOUNTER — APPOINTMENT (OUTPATIENT)
Dept: PHYSICAL THERAPY | Age: 62
End: 2022-02-28
Payer: COMMERCIAL

## 2022-03-03 ENCOUNTER — HOSPITAL ENCOUNTER (OUTPATIENT)
Dept: PHYSICAL THERAPY | Age: 62
Setting detail: THERAPIES SERIES
Discharge: HOME | End: 2022-03-03
Payer: COMMERCIAL

## 2022-03-03 ENCOUNTER — HOSPITAL ENCOUNTER (OUTPATIENT)
Dept: CT IMAGING | Age: 62
Discharge: HOME | End: 2022-03-05
Payer: COMMERCIAL

## 2022-03-03 DIAGNOSIS — R42 VERTIGO: ICD-10-CM

## 2022-03-03 DIAGNOSIS — R25.9 INVOLUNTARY MOVEMENTS: ICD-10-CM

## 2022-03-03 PROCEDURE — 97110 THERAPEUTIC EXERCISES: CPT

## 2022-03-03 PROCEDURE — 70450 CT HEAD/BRAIN W/O DYE: CPT

## 2022-03-03 ASSESSMENT — PAIN SCALES - GENERAL: PAINLEVEL_OUTOF10: 2

## 2022-03-03 ASSESSMENT — PAIN DESCRIPTION - LOCATION: LOCATION: SHOULDER

## 2022-03-03 ASSESSMENT — PAIN DESCRIPTION - DESCRIPTORS: DESCRIPTORS: ACHING

## 2022-03-03 ASSESSMENT — PAIN DESCRIPTION - PAIN TYPE: TYPE: ACUTE PAIN

## 2022-03-03 ASSESSMENT — PAIN DESCRIPTION - ORIENTATION: ORIENTATION: RIGHT

## 2022-03-03 NOTE — PROGRESS NOTES
[address] [facility]  Outpatient Physical Therapy    Treatment Note        Date: 3/3/2022  Patient: Pita Townsend  : 1960  ACCT #: [de-identified]  Referring Practitioner: Brenda Spring  Diagnosis: Unspecified sprain of right shoulder  Treatment Diagnosis: R shoulder pain, decreased R shoulder A/PROM, decreased R UE strength, decreased postural awareness, and decreased fucntional activity tolerance     Visit Information:  PT Visit Information  Onset Date: 22  PT Insurance Information: BWC-presumptive  Total # of Visits Approved: 12  Total # of Visits to Date: 3  No Show: 1  Canceled Appointment: 3  Progress Note Counter: 3/8-12 (through 3/25/22)    Subjective: Pt arrived to therapy today reporting minimal pain in right shoulder     HEP Compliance:  [x] Good [] Fair [] Poor [] Reports not doing due to:    Vital Signs  Patient Currently in Pain: Yes   Pain Screening  Patient Currently in Pain: Yes  Pain Assessment  Pain Assessment: 0-10  Pain Level: 2  Pain Type: Acute pain  Pain Location: Shoulder  Pain Orientation: Right  Pain Descriptors: Aching    OBJECTIVE:   Exercises  Exercise 1: Finger Ladder ' x 5 Flexion  Exercise 2: Standing Wand 5-way x 10 ea. Exercise 4: pulleys x 4 flexion  Exercise 5: Ball rollouts withP-ball  5s x 10 flexion/scaption  Exercise 6: tband rows/lats Yellow x 10  Exercise 10: Ball Stabs on wall 4-way x 10 ea. (Moderate fatigue noted) VCand visual demo provided for proper technique. Strength: [x] NT  [] MMT completed:    ROM: [x] NT  [] ROM measurements:       Modalities:  Modalities  Cryotherapy (Minutes\Location): CP right shoulder x 8 min - time limited due to pt's scheduled appointment time for MRI     *Indicates exercise, modality, or manual techniques to be initiated when appropriate    Assessment:         Body structures, Functions, Activity limitations: Decreased ADL status,Decreased ROM,Decreased strength,Increased pain,Decreased posture,Decreased high-level IADLs  Assessment: Focus tx session in standing / upright position due to pt report of continued struggles with vertigo. Progressed multiple exercises with focus on Mobility and general strengthing. Exercises stopped early due to increased fatigue and pain reporting in right UE, limited therapy tolerance due to inability to tolerate laying down at this time. Treatment Diagnosis: R shoulder pain, decreased R shoulder A/PROM, decreased R UE strength, decreased postural awareness, and decreased fucntional activity tolerance  Prognosis: Good       Goals:  Short term goals  Time Frame for Short term goals: 2-3 weeks  Short term goal 1: The pt will demonstrate improved postural awareness requiring <25% VC's with exercises    Long term goals  Time Frame for Long term goals : 4-6 weeks  Long term goal 1: The pt will be indep/compliant with HEP in order to self-manage symptoms upon D/C  Long term goal 2: The pt will have a increase in UEFI score >/=10 points in order to increase functional activity tolerance  Long term goal 3: The pt will demo improved R shoulder AROM flex/abd >/=140*, ER >/=T1, IR >/=L1 in order to increase ease with ADL's  Long term goal 4: The pt will have decreased R shoulder pain </= 4-5/10 at worst in order to increase ease with ADL's  Long term goal 5: The pt will demonstrate improved R shoulder strength >/=4/5, elbow strength >/=4+/5, periscapular strength >/=3+/5 in order to lift/carry with decreased pain  Progress toward goals:progression to gentle strengthening  exercises with fair tolerance     POST-PAIN       Pain Rating (0-10 pain scale):  3 /10   Location and pain description same as pre-treatment unless indicated. Action: [] NA   [x] Perform HEP  [] Meds as prescribed  [] Modalities as prescribed   [] Call Physician     Frequency/Duration:        Pt to continue current HEP.   See objective section for any therapeutic exercise changes, additions or modifications this date.          PT Individual Minutes  Time In: 6763  Time Out: 1728  Minutes: 46  Timed Code Treatment Minutes: 38 Minutes  Procedure Minutes: 8 min CP     Modality Time In Time Out Total Minutes Units    Cold Pack  [id number])  1727 1728 8 0   Ther ex (25244) [id number] 72 3     Signature:  Electronically signed by Jeffery Bustillo PTA on 3/3/22 at 5:38 PM EST

## 2022-03-07 ENCOUNTER — HOSPITAL ENCOUNTER (OUTPATIENT)
Dept: PHYSICAL THERAPY | Age: 62
Setting detail: THERAPIES SERIES
Discharge: HOME | End: 2022-03-07
Payer: COMMERCIAL

## 2022-03-07 NOTE — PROGRESS NOTES
Therapy                            Cancellation/No-show Note      Date:  3/7/2022  Patient Name:  Emmy Resendez  :  1960   MRN:  [id number]  Referring Practitioner: Vanessa Prescott  Diagnosis: Unspecified sprain of right shoulder    Visit Information:  PT Visit Information  Onset Date: 22  PT Insurance Information: BWC-presumptive  Total # of Visits Approved: 12  Total # of Visits to Date: 3  No Show: 1  Canceled Appointment: 4  Progress Note Counter: 3/8-12 (through 3/25/22) CX 3/7/22    For today's appointment patient:  [x]  Cancelled  []  Rescheduled appointment  []  No-show   []  Called pt to remind of next appointment     Reason given by patient:  [x]  Patient ill  []  Conflicting appointment  []  No transportation    []  Conflict with work  []  No reason given  []  Other:      [x] Pt has future appointments scheduled, no follow up needed  [] Pt requests to be on hold.     Reason:   If > 2 weeks please discuss with therapist.  [] Therapist to call pt for follow up     Comments:       Signature: Electronically signed by Itzel Rice PTA on 3/7/22 at 4:56 PM EST

## 2022-03-09 ENCOUNTER — TELEPHONE (OUTPATIENT)
Dept: FAMILY MEDICINE CLINIC | Age: 62
End: 2022-03-09

## 2022-03-09 RX ORDER — AMOXICILLIN AND CLAVULANATE POTASSIUM 875; 125 MG/1; MG/1
1 TABLET, FILM COATED ORAL 2 TIMES DAILY
Qty: 14 TABLET | Refills: 0 | Status: SHIPPED | OUTPATIENT
Start: 2022-03-09 | End: 2022-03-16

## 2022-03-09 NOTE — TELEPHONE ENCOUNTER
----- Message from Author Hilton sent at 3/8/2022  3:37 PM EST -----  Subject: Message to Provider    QUESTIONS  Information for Provider? Pt said that he got the results from the CT Scan   and they said that he has sinusitis. He is looking to see if he can get an   antibiotic or something that can be prescribed so that this can be cured   and he can stop having the vertigo. Can you please assist the pt?  ---------------------------------------------------------------------------  --------------  CALL BACK INFO  What is the best way for the office to contact you? OK to leave message on   voicemail  Preferred Call Back Phone Number? [phone]  ---------------------------------------------------------------------------  --------------  SCRIPT ANSWERS  Relationship to Patient?  Self

## 2022-03-10 ENCOUNTER — HOSPITAL ENCOUNTER (OUTPATIENT)
Dept: PHYSICAL THERAPY | Age: 62
Setting detail: THERAPIES SERIES
Discharge: HOME | End: 2022-03-10
Payer: COMMERCIAL

## 2022-03-10 NOTE — PROGRESS NOTES
Therapy                            Cancellation/No-show Note      Date:  3/10/2022  Patient Name:  Rupa Nunes  :  1960   MRN:  [id number]  Referring Practitioner: Lanre Zamora  Diagnosis: Unspecified sprain of right shoulder    Visit Information:  PT Visit Information  Onset Date: 22  PT Insurance Information: BWC-presumptive  Total # of Visits Approved: 12  Total # of Visits to Date: 3  No Show: 1  Canceled Appointment: 5  Progress Note Counter: 3/8-12 (through 3/25/22)- cx 3/10/22    For today's appointment patient:  [x]  Cancelled  []  Rescheduled appointment  []  No-show   []  Called pt to remind of next appointment     Reason given by patient:  [x]  Patient ill  []  Conflicting appointment  []  No transportation    []  Conflict with work  []  No reason given  [x]  Other:  Pt reports having ongoing vertigo symptoms, MD put him on an antibiotic for sinusitis but he reports he can hardly turn his head without getting severely dizzy. Requests to be placed on hold until feeling better. [] Pt has future appointments scheduled, no follow up needed  [x] Pt requests to be on hold.     Reason:   If > 2 weeks please discuss with therapist.  [] Therapist to call pt for follow up     Comments:       Signature: Electronically signed by Zakia Dukes PT on 3/10/22 at 2:49 PM EST

## 2022-03-14 ENCOUNTER — APPOINTMENT (OUTPATIENT)
Dept: PHYSICAL THERAPY | Age: 62
End: 2022-03-14
Payer: COMMERCIAL

## 2022-03-17 ENCOUNTER — APPOINTMENT (OUTPATIENT)
Dept: PHYSICAL THERAPY | Age: 62
End: 2022-03-17
Payer: COMMERCIAL

## 2022-03-21 ENCOUNTER — APPOINTMENT (OUTPATIENT)
Dept: PHYSICAL THERAPY | Age: 62
End: 2022-03-21
Payer: COMMERCIAL

## 2022-03-24 ENCOUNTER — APPOINTMENT (OUTPATIENT)
Dept: PHYSICAL THERAPY | Age: 62
End: 2022-03-24
Payer: COMMERCIAL

## 2022-03-25 ENCOUNTER — TELEPHONE (OUTPATIENT)
Dept: FAMILY MEDICINE CLINIC | Age: 62
End: 2022-03-25

## 2022-03-25 NOTE — PROGRESS NOTES
[address]  PHYSICAL THERAPY PLAN OF CARE   [address] [place], [id number] [place] [address]         Ph: [phone]  Fax: [phone]    [] Certification  [] Recertification []  Plan of Care  [] Progress Note [x] Discharge      To:  Balaji Dennis      From:  Harish Mayfield, PT, DPT  Patient: eLa Lo     : 1960  Diagnosis: Unspecified sprain of right shoulder     Date: 3/25/2022  Treatment Diagnosis: R shoulder pain, decreased R shoulder A/PROM, decreased R UE strength, decreased postural awareness, and decreased fucntional activity tolerance     Progress Report Period from:  2022  to 3/3/2022    Total # of Visits to Date: 3   No Show: 1    Canceled Appointment: 3     OBJECTIVE:   Short Term Goals - Time Frame for Short term goals: 2-3 weeks    Goals Current/Discharge status  Met   Short term goal 1: The pt will demonstrate improved postural awareness requiring <25% VC's with exercises  NT secondary to unexpected D/C [] yes  [] no     Long Term Goals - Time Frame for Long term goals : 4-6 weeks  Goals Current/ Discharge status Met   Long term goal 1: The pt will be indep/compliant with HEP in order to self-manage symptoms upon D/C NT secondary to unexpected D/C [] yes  [] no   Long term goal 2: The pt will have a increase in UEFI score >/=10 points in order to increase functional activity tolerance NT secondary to unexpected D/C [] yes  [] no   Long term goal 3: The pt will demo improved R shoulder AROM flex/abd >/=140*, ER >/=T1, IR >/=L1 in order to increase ease with ADL's NT secondary to unexpected D/C [] yes  [] no   Long term goal 4: The pt will have decreased R shoulder pain </= 4-5/10 at worst in order to increase ease with ADL's NT secondary to unexpected D/C [] yes  [] no   Long term goal 5:  The pt will demonstrate improved R shoulder strength >/=4/5, elbow strength >/=4+/5, periscapular strength >/=3+/5 in order to lift/carry with decreased pain NT secondary to unexpected

## 2022-03-25 NOTE — TELEPHONE ENCOUNTER
Patient called stating he spoke with Jo Ann Louie from PT and they dicussed having an order placed so patient can be evaluated and treated for vertigo.

## 2022-04-22 ENCOUNTER — HOSPITAL ENCOUNTER (EMERGENCY)
Age: 62
Discharge: HOME | End: 2022-04-22
Attending: EMERGENCY MEDICINE
Payer: COMMERCIAL

## 2022-04-22 ENCOUNTER — APPOINTMENT (OUTPATIENT)
Dept: GENERAL RADIOLOGY | Age: 62
End: 2022-04-22
Payer: COMMERCIAL

## 2022-04-22 ENCOUNTER — APPOINTMENT (OUTPATIENT)
Dept: CT IMAGING | Age: 62
End: 2022-04-22
Payer: COMMERCIAL

## 2022-04-22 VITALS
OXYGEN SATURATION: 97 % | HEART RATE: 77 BPM | WEIGHT: 200 LBS | SYSTOLIC BLOOD PRESSURE: 171 MMHG | RESPIRATION RATE: 24 BRPM | BODY MASS INDEX: 30.31 KG/M2 | TEMPERATURE: 96.8 F | HEIGHT: 68 IN | DIASTOLIC BLOOD PRESSURE: 106 MMHG

## 2022-04-22 DIAGNOSIS — R42 DIZZINESS: Primary | ICD-10-CM

## 2022-04-22 DIAGNOSIS — J01.00 ACUTE NON-RECURRENT MAXILLARY SINUSITIS: ICD-10-CM

## 2022-04-22 LAB
ALBUMIN SERPL-MCNC: 4.5 G/DL (ref 3.5–4.6)
ALP BLD-CCNC: 49 U/L (ref 35–104)
ALT SERPL-CCNC: 22 U/L (ref 0–41)
ANION GAP SERPL CALCULATED.3IONS-SCNC: 13 MEQ/L (ref 9–15)
AST SERPL-CCNC: 20 U/L (ref 0–40)
BASOPHILS ABSOLUTE: 0 K/UL (ref 0–0.2)
BASOPHILS RELATIVE PERCENT: 0.2 %
BILIRUB SERPL-MCNC: 0.7 MG/DL (ref 0.2–0.7)
BUN BLDV-MCNC: 16 MG/DL (ref 8–23)
CALCIUM SERPL-MCNC: 9.3 MG/DL (ref 8.5–9.9)
CHLORIDE BLD-SCNC: 104 MEQ/L (ref 95–107)
CO2: 23 MEQ/L (ref 20–31)
CREAT SERPL-MCNC: 1 MG/DL (ref 0.7–1.2)
EKG ATRIAL RATE: 76 BPM
EKG P AXIS: 59 DEGREES
EKG P-R INTERVAL: 138 MS
EKG Q-T INTERVAL: 418 MS
EKG QRS DURATION: 100 MS
EKG QTC CALCULATION (BAZETT): 470 MS
EKG R AXIS: 17 DEGREES
EKG T AXIS: 115 DEGREES
EKG VENTRICULAR RATE: 76 BPM
EOSINOPHILS ABSOLUTE: 0.1 K/UL (ref 0–0.7)
EOSINOPHILS RELATIVE PERCENT: 0.9 %
GFR AFRICAN AMERICAN: >60
GFR NON-AFRICAN AMERICAN: >60
GLOBULIN: 2.6 G/DL (ref 2.3–3.5)
GLUCOSE BLD-MCNC: 137 MG/DL (ref 70–99)
HCT VFR BLD CALC: 42 % (ref 42–52)
HEMOGLOBIN: 14.6 G/DL (ref 14–18)
LYMPHOCYTES ABSOLUTE: 1.7 K/UL (ref 1–4.8)
LYMPHOCYTES RELATIVE PERCENT: 17.2 %
MAGNESIUM: 1.9 MG/DL (ref 1.7–2.4)
MCH RBC QN AUTO: 31.8 PG (ref 27–31.3)
MCHC RBC AUTO-ENTMCNC: 34.8 % (ref 33–37)
MCV RBC AUTO: 91.4 FL (ref 80–100)
MONOCYTES ABSOLUTE: 0.6 K/UL (ref 0.2–0.8)
MONOCYTES RELATIVE PERCENT: 6.2 %
NEUTROPHILS ABSOLUTE: 7.5 K/UL (ref 1.4–6.5)
NEUTROPHILS RELATIVE PERCENT: 75.5 %
PDW BLD-RTO: 13.5 % (ref 11.5–14.5)
PLATELET # BLD: 226 K/UL (ref 130–400)
POTASSIUM SERPL-SCNC: 4 MEQ/L (ref 3.4–4.9)
RBC # BLD: 4.59 M/UL (ref 4.7–6.1)
SODIUM BLD-SCNC: 140 MEQ/L (ref 135–144)
TOTAL PROTEIN: 7.1 G/DL (ref 6.3–8)
TROPONIN: <0.01 NG/ML (ref 0–0.01)
WBC # BLD: 10 K/UL (ref 4.8–10.8)

## 2022-04-22 PROCEDURE — 2580000003 HC RX 258: Performed by: EMERGENCY MEDICINE

## 2022-04-22 PROCEDURE — 71045 X-RAY EXAM CHEST 1 VIEW: CPT

## 2022-04-22 PROCEDURE — 70450 CT HEAD/BRAIN W/O DYE: CPT

## 2022-04-22 PROCEDURE — 6370000000 HC RX 637 (ALT 250 FOR IP): Performed by: EMERGENCY MEDICINE

## 2022-04-22 PROCEDURE — 36415 COLL VENOUS BLD VENIPUNCTURE: CPT

## 2022-04-22 PROCEDURE — 85025 COMPLETE CBC W/AUTO DIFF WBC: CPT

## 2022-04-22 PROCEDURE — 93005 ELECTROCARDIOGRAM TRACING: CPT | Performed by: EMERGENCY MEDICINE

## 2022-04-22 PROCEDURE — 84484 ASSAY OF TROPONIN QUANT: CPT

## 2022-04-22 PROCEDURE — 83735 ASSAY OF MAGNESIUM: CPT

## 2022-04-22 PROCEDURE — 99285 EMERGENCY DEPT VISIT HI MDM: CPT

## 2022-04-22 PROCEDURE — 93010 ELECTROCARDIOGRAM REPORT: CPT | Performed by: INTERNAL MEDICINE

## 2022-04-22 PROCEDURE — 80053 COMPREHEN METABOLIC PANEL: CPT

## 2022-04-22 RX ORDER — MECLIZINE HYDROCHLORIDE 25 MG/1
25 TABLET ORAL ONCE
Status: COMPLETED | OUTPATIENT
Start: 2022-04-22 | End: 2022-04-22

## 2022-04-22 RX ORDER — MECLIZINE HYDROCHLORIDE 25 MG/1
25 TABLET ORAL 2 TIMES DAILY PRN
Qty: 20 TABLET | Refills: 0 | Status: SHIPPED | OUTPATIENT
Start: 2022-04-22 | End: 2022-05-02

## 2022-04-22 RX ORDER — AZITHROMYCIN 250 MG/1
TABLET, FILM COATED ORAL
Qty: 1 PACKET | Refills: 0 | Status: SHIPPED | OUTPATIENT
Start: 2022-04-22 | End: 2022-04-26

## 2022-04-22 RX ORDER — 0.9 % SODIUM CHLORIDE 0.9 %
1000 INTRAVENOUS SOLUTION INTRAVENOUS ONCE
Status: COMPLETED | OUTPATIENT
Start: 2022-04-22 | End: 2022-04-22

## 2022-04-22 RX ADMIN — SODIUM CHLORIDE 1000 ML: 9 INJECTION, SOLUTION INTRAVENOUS at 10:35

## 2022-04-22 RX ADMIN — MECLIZINE HYDROCHLORIDE 25 MG: 25 TABLET ORAL at 10:35

## 2022-04-22 ASSESSMENT — ENCOUNTER SYMPTOMS
VOMITING: 0
DIARRHEA: 0
SHORTNESS OF BREATH: 0
COUGH: 0
NAUSEA: 0
ABDOMINAL PAIN: 0
SORE THROAT: 0
BACK PAIN: 0

## 2022-04-22 ASSESSMENT — PAIN - FUNCTIONAL ASSESSMENT
PAIN_FUNCTIONAL_ASSESSMENT: NONE - DENIES PAIN
PAIN_FUNCTIONAL_ASSESSMENT: NONE - DENIES PAIN

## 2022-04-22 NOTE — ED NOTES
Discharge instructions discussed with patient. No further questions. Patient ambulated to ED exit without assistance.       Danyell Christie RN  04/22/22 3550

## 2022-04-22 NOTE — ED PROVIDER NOTES
[address] [facility] ED  eMERGENCYdEPARTMENT eNCOUnter      Pt Name: Carla Armenta  MRN: [id number]  [place] 1960  Date of evaluation: 4/22/2022  Connor Elkins MD    CHIEF COMPLAINT           HPI  Carla Armenta is a 59 y.o. male per chart review has a h/o HTN, low back pain presents to the ED with dizziness. Pt notes he started a new bp med today and it was norvasc. Pt notes shortly after taking the medication he felt dizzy. Pt notes gradual onset, moderate, constant, dizziness since this am.  Pt denies fever, n/v, cp, sob, ab pain, dysuria, diarrhea. ROS  Review of Systems   Constitutional: Negative for activity change, chills and fever. HENT: Negative for ear pain and sore throat. Eyes: Negative for visual disturbance. Respiratory: Negative for cough and shortness of breath. Cardiovascular: Negative for chest pain, palpitations and leg swelling. Gastrointestinal: Negative for abdominal pain, diarrhea, nausea and vomiting. Genitourinary: Negative for dysuria. Musculoskeletal: Negative for back pain. Skin: Negative for rash. Neurological: Positive for dizziness. Negative for weakness. Except as noted above the remainder of the review of systems was reviewed and negative. PAST MEDICAL HISTORY     Past Medical History:   Diagnosis Date    Chronic pain     Emphysema (subcutaneous) (surgical) resulting from a procedure     Hypertension     Low back pain          SURGICAL HISTORY     No past surgical history on file.       CURRENTMEDICATIONS       Previous Medications    ASPIRIN (ASPIR-LOW) 81 MG EC TABLET        CARVEDILOL (COREG) 12.5 MG TABLET        CREON [id number] UNITS DELAYED RELEASE CAPSULE        LOSARTAN (COZAAR) 100 MG TABLET    TAKE 1 TABLET BY MOUTH EVERY DAY    MELATONIN 3 MG TABS TABLET    Take by mouth    NITROGLYCERIN (NITROSTAT) 0.4 MG SL TABLET    Place under the tongue    OMEPRAZOLE (PRILOSEC) 40 MG DELAYED RELEASE CAPSULE        PANCRELIPASE, LIP-PROT-AMYL, [id number] UNITS CPEP    Take 1 tablet orally daily    PANTOPRAZOLE (PROTONIX) 40 MG TABLET    TAKE 1 TABLET BY MOUTH EVERY DAY    PRASUGREL (EFFIENT) 10 MG TABS    TAKE 1 TABLET BY MOUTH EVERY DAY    SUCRALFATE (CARAFATE) 1 GM TABLET    TAKE 1 TABLET BY MOUTH THREE TIMES A DAY    TAMSULOSIN (FLOMAX) 0.4 MG CAPSULE    Take 1 capsule by mouth daily       ALLERGIES     Clopidogrel, Ezetimibe, Morphine, Other, and Statins    FAMILY HISTORY       Family History   Problem Relation Age of Onset    Arthritis Mother     Heart Disease Mother     Coronary Art Dis Mother     Hypertension Mother           SOCIAL HISTORY       Social History     Socioeconomic History    Marital status:      Spouse name: Not on file    Number of children: Not on file    Years of education: Not on file    Highest education level: Not on file   Occupational History    Not on file   Tobacco Use    Smoking status: Former Smoker     Packs/day: 0.25     Years: 10.00     Pack years: 2.50     Types: Cigarettes     Quit date:      Years since quittin.3    Smokeless tobacco: Never Used    Tobacco comment: 2001 quit smoking   Vaping Use    Vaping Use: Never used   Substance and Sexual Activity    Alcohol use: Yes     Alcohol/week: 1.0 standard drink     Types: 1 Cans of beer per week     Comment: social     Drug use: Never    Sexual activity: Not on file   Other Topics Concern    Not on file   Social History Narrative    Not on file     Social Determinants of Health     Financial Resource Strain: Low Risk     Difficulty of Paying Living Expenses: Not hard at all   Food Insecurity: No Food Insecurity    Worried About [address] [employer] in the Last Year: Never true    [facility] in the Last Year: Never true   Transportation Needs:     Lack of Transportation (Medical): Not on file    Lack of Transportation (Non-Medical):  Not on file   Physical Activity:     Days of Exercise per Week: Not on file  Minutes of Exercise per Session: Not on file   Stress:     Feeling of Stress : Not on file   Social Connections:     Frequency of Communication with Friends and Family: Not on file    Frequency of Social Gatherings with Friends and Family: Not on file    Attends [employer] Services: Not on file    Active Member of [address] [place] [address] or Organizations: Not on file    Attends Club or Organization Meetings: Not on file    Marital Status: Not on file   Intimate Partner Violence:     Fear of Current or Ex-Partner: Not on file    Emotionally Abused: Not on file    Physically Abused: Not on file    Sexually Abused: Not on file   Housing Stability:     Unable to Pay for Housing in the Last Year: Not on file    Number of [place] in the Last Year: Not on file    Unstable Housing in the Last Year: Not on file         PHYSICAL EXAM       ED Triage Vitals [04/22/22 1006]   BP Temp Temp Source Pulse Resp SpO2 Height Weight   (!) 173/100 96.8 °F (36 °C) Temporal 75 18 99 % 5' 8\" (1.727 m) 200 lb (90.7 kg)       Physical Exam  Vitals and nursing note reviewed. Constitutional:       Appearance: He is well-developed. HENT:      Head: Normocephalic. Right Ear: External ear normal.      Left Ear: External ear normal.   Eyes:      Conjunctiva/sclera: Conjunctivae normal.      Pupils: Pupils are equal, round, and reactive to light. Cardiovascular:      Rate and Rhythm: Normal rate and regular rhythm. Heart sounds: Normal heart sounds. Pulmonary:      Effort: Pulmonary effort is normal.      Breath sounds: Normal breath sounds. Abdominal:      General: Bowel sounds are normal. There is no distension. Palpations: Abdomen is soft. Tenderness: There is no abdominal tenderness. Musculoskeletal:         General: Normal range of motion. Cervical back: Normal range of motion and neck supple. Skin:     General: Skin is warm and dry.    Neurological:      Mental Status: He is alert and oriented to person, place, and time. Psychiatric:         Mood and Affect: Mood normal.           MDM  60 yo male presents to the ED with dizziness after taking norvasc for the first time. Pt is afebrile, hemodynamically stable however noted to be hypertensive. Pt given 1 L NS, PO antivert in the ED. EKG shows NSR with HR 76, normal axis, normal intervals, no ST changes. Labs unremarkable. CXR negative. CT head shows bilateral sinusitis. Pt's norvasc and sinusitis likely contributing to dizziness. Pt reassessed and feels completely better. Pt wanting to go back to work. Pt will split his norvasc pill in half to help his body get adjusted. Pt given prescription for antivert, azithromycin. Pt given dizziness warning signs and will f/u with pcp. FINAL IMPRESSION      1. Dizziness    2.  Acute non-recurrent maxillary sinusitis          DISPOSITION/PLAN   DISPOSITION Decision To Discharge 04/22/2022 11:42:29 AM        DISCHARGE MEDICATIONS:  [unfilled]         Tenzin Alexis MD(electronically signed)  Attending Emergency Physician            Tenzin Alexis MD  04/22/22 4483

## 2022-04-22 NOTE — ED TRIAGE NOTES
Pt arrived via EMS. Pt took first dose amlodipine this morning. Started to feel dizziness, laid on floor with relief. No other symptoms.

## 2022-06-17 RX ORDER — TAMSULOSIN HYDROCHLORIDE 0.4 MG/1
CAPSULE ORAL
Qty: 30 CAPSULE | Refills: 0 | Status: SHIPPED | OUTPATIENT
Start: 2022-06-17

## 2023-04-18 ENCOUNTER — TELEPHONE (OUTPATIENT)
Dept: PRIMARY CARE | Facility: CLINIC | Age: 63
End: 2023-04-18

## 2023-04-18 NOTE — TELEPHONE ENCOUNTER
Pt called wondering if he could set up an appointment to get the dark skin spots on his head frozen off?  He said you guys have discussed doing this in the past  Im not sure how much time or when you would want something like this added to your schedule.  The patient is losing insurance at the end of this month so he would need to get in before then  Let me know the best way to go about scheduling this and I will give him a call back   Thank you!

## 2023-04-26 ENCOUNTER — OFFICE VISIT (OUTPATIENT)
Dept: PRIMARY CARE | Facility: CLINIC | Age: 63
End: 2023-04-26
Payer: COMMERCIAL

## 2023-04-26 VITALS
RESPIRATION RATE: 16 BRPM | BODY MASS INDEX: 32.58 KG/M2 | WEIGHT: 215 LBS | HEART RATE: 72 BPM | DIASTOLIC BLOOD PRESSURE: 98 MMHG | TEMPERATURE: 97.9 F | SYSTOLIC BLOOD PRESSURE: 164 MMHG | HEIGHT: 68 IN

## 2023-04-26 DIAGNOSIS — J41.0 SIMPLE CHRONIC BRONCHITIS (MULTI): ICD-10-CM

## 2023-04-26 DIAGNOSIS — L57.0 ACTINIC KERATOSIS OF SCALP: Primary | ICD-10-CM

## 2023-04-26 DIAGNOSIS — J06.9 VIRAL UPPER RESPIRATORY TRACT INFECTION: ICD-10-CM

## 2023-04-26 PROBLEM — H16.133 ACTINIC KERATITIS OF BOTH EYES: Status: ACTIVE | Noted: 2023-04-26

## 2023-04-26 PROBLEM — Z98.61 CAD S/P PERCUTANEOUS CORONARY ANGIOPLASTY: Status: ACTIVE | Noted: 2023-04-26

## 2023-04-26 PROBLEM — I25.10 ATHSCL HEART DISEASE OF NATIVE CORONARY ARTERY W/O ANG PCTRS: Status: ACTIVE | Noted: 2018-07-13

## 2023-04-26 PROBLEM — M54.9 BACK PAIN: Status: ACTIVE | Noted: 2023-04-26

## 2023-04-26 PROBLEM — I10 ESSENTIAL HYPERTENSION: Status: ACTIVE | Noted: 2018-07-13

## 2023-04-26 PROBLEM — I25.10 CAD S/P PERCUTANEOUS CORONARY ANGIOPLASTY: Status: ACTIVE | Noted: 2023-04-26

## 2023-04-26 PROBLEM — I25.2 HISTORY OF MI (MYOCARDIAL INFARCTION): Status: ACTIVE | Noted: 2023-04-26

## 2023-04-26 PROBLEM — K21.9 GERD (GASTROESOPHAGEAL REFLUX DISEASE): Status: ACTIVE | Noted: 2023-04-26

## 2023-04-26 PROBLEM — J44.9 CHRONIC OBSTRUCTIVE PULMONARY DISEASE (MULTI): Status: ACTIVE | Noted: 2018-07-13

## 2023-04-26 PROBLEM — N40.0 BPH (BENIGN PROSTATIC HYPERPLASIA): Status: ACTIVE | Noted: 2023-04-26

## 2023-04-26 PROBLEM — H90.3 BILATERAL HIGH FREQUENCY SENSORINEURAL HEARING LOSS: Status: ACTIVE | Noted: 2023-04-26

## 2023-04-26 PROBLEM — Z95.2 S/P TAVR (TRANSCATHETER AORTIC VALVE REPLACEMENT): Status: ACTIVE | Noted: 2023-04-26

## 2023-04-26 PROBLEM — E78.2 MIXED HYPERLIPIDEMIA: Status: ACTIVE | Noted: 2023-04-26

## 2023-04-26 PROBLEM — H16.133 ACTINIC KERATITIS OF BOTH EYES: Status: RESOLVED | Noted: 2023-04-26 | Resolved: 2023-04-26

## 2023-04-26 PROBLEM — R51.9 HEADACHE: Status: ACTIVE | Noted: 2023-04-26

## 2023-04-26 PROCEDURE — 99212 OFFICE O/P EST SF 10 MIN: CPT | Performed by: FAMILY MEDICINE

## 2023-04-26 PROCEDURE — 3077F SYST BP >= 140 MM HG: CPT | Performed by: FAMILY MEDICINE

## 2023-04-26 PROCEDURE — 17110 DESTRUCTION B9 LES UP TO 14: CPT | Performed by: FAMILY MEDICINE

## 2023-04-26 PROCEDURE — 1036F TOBACCO NON-USER: CPT | Performed by: FAMILY MEDICINE

## 2023-04-26 PROCEDURE — 3080F DIAST BP >= 90 MM HG: CPT | Performed by: FAMILY MEDICINE

## 2023-04-26 RX ORDER — ALBUTEROL SULFATE 90 UG/1
2 AEROSOL, METERED RESPIRATORY (INHALATION)
COMMUNITY
Start: 2023-01-04 | End: 2023-10-19 | Stop reason: ALTCHOICE

## 2023-04-26 RX ORDER — AMOXICILLIN 500 MG/1
4 CAPSULE ORAL AS NEEDED
COMMUNITY
End: 2024-01-29

## 2023-04-26 RX ORDER — FLUTICASONE PROPIONATE 50 MCG
2 SPRAY, SUSPENSION (ML) NASAL DAILY
COMMUNITY
Start: 2023-03-26 | End: 2023-10-19 | Stop reason: ALTCHOICE

## 2023-04-26 RX ORDER — OMEPRAZOLE 40 MG/1
1 CAPSULE, DELAYED RELEASE ORAL DAILY
COMMUNITY
Start: 2015-09-16 | End: 2024-02-27 | Stop reason: ENTERED-IN-ERROR

## 2023-04-26 RX ORDER — AMLODIPINE BESYLATE 5 MG/1
10 TABLET ORAL DAILY
COMMUNITY
End: 2024-01-25

## 2023-04-26 RX ORDER — SPIRONOLACTONE 25 MG/1
1 TABLET ORAL DAILY
COMMUNITY
Start: 2023-04-23 | End: 2024-02-08 | Stop reason: SINTOL

## 2023-04-26 RX ORDER — SWAB
1 SWAB, NON-MEDICATED MISCELLANEOUS DAILY
COMMUNITY
End: 2023-10-19 | Stop reason: ALTCHOICE

## 2023-04-26 RX ORDER — CARVEDILOL 25 MG/1
25 TABLET ORAL
COMMUNITY
Start: 2023-04-23 | End: 2024-04-12

## 2023-04-26 ASSESSMENT — ENCOUNTER SYMPTOMS
FEVER: 0
HEADACHES: 0
SHORTNESS OF BREATH: 0
RHINORRHEA: 0
SORE THROAT: 0
COUGH: 1

## 2023-04-26 NOTE — PROGRESS NOTES
Subjective   Emiliano Mann is a 62 y.o. male who presents for Cough.  Cough  This is a new problem. The problem has been unchanged. The cough is Productive of brown sputum. Pertinent negatives include no ear pain, fever, headaches, nasal congestion, rhinorrhea, sore throat or shortness of breath. Treatments tried: Mucinex and Albuterol. The treatment provided mild relief.   He is also here to have lesions removed from his scalp.  Review of Systems   Constitutional:  Negative for fever.   HENT:  Negative for ear pain, rhinorrhea and sore throat.    Respiratory:  Positive for cough. Negative for shortness of breath.    Neurological:  Negative for headaches.     Visit Vitals  BP (!) 164/98   Pulse 72   Temp 36.6 °C (97.9 °F)   Resp 16      Objective   Physical Exam  Constitutional:       Appearance: Normal appearance.   HENT:      Head: Normocephalic.   Eyes:      Conjunctiva/sclera: Conjunctivae normal.   Cardiovascular:      Rate and Rhythm: Normal rate and regular rhythm.   Pulmonary:      Effort: Pulmonary effort is normal.      Breath sounds: Normal breath sounds.   Musculoskeletal:      Cervical back: Neck supple.   Skin:     General: Skin is warm and dry.      Comments: There are 4 separate lesions on the top of his scalp and left temple that are slightly erythematous with keratinized rough flaking skin but no pigment.  These are very consistent with actinic keratosis   Neurological:      Mental Status: He is alert.         Assessment/Plan    Problem List Items Addressed This Visit       Chronic obstructive pulmonary disease (CMS/HCC)     Other Visit Diagnoses       Actinic keratitis of both eyes    -  Primary    Viral upper respiratory tract infection            The lung sounds are actually clear today and overall he seems to be improving.  This upper respiratory infection started out with nausea and vomiting and progressed to a wet cough which is slowly getting better.  I think this is most likely viral.   There is no clinical evidence of pneumonia today and it does not fit the pattern of bronchitis.  I recommend supportive care and if this worsens or does not improve by Friday he should call back and would consider treatment with antibiotic  Patient ID: Emiliano Mann is a 62 y.o. male.    Destruction lesion    Date/Time: 4/26/2023 6:26 PM    Performed by: Arnoldo Mota MD  Authorized by: Arnoldo Mota MD      Comments:  4 separate lesions were treated with a cryogun to achieve a complete frost with a 1 mm frost ring.  The patient tolerated the procedure very well.  Wound care instructions were given verbally.  I warned about possible blistering healing or redness of the skin.

## 2023-05-31 ENCOUNTER — OFFICE VISIT (OUTPATIENT)
Dept: PRIMARY CARE | Facility: CLINIC | Age: 63
End: 2023-05-31
Payer: MEDICARE

## 2023-05-31 VITALS
HEART RATE: 72 BPM | DIASTOLIC BLOOD PRESSURE: 106 MMHG | HEIGHT: 68 IN | BODY MASS INDEX: 32.58 KG/M2 | RESPIRATION RATE: 16 BRPM | WEIGHT: 215 LBS | SYSTOLIC BLOOD PRESSURE: 174 MMHG | TEMPERATURE: 98.6 F

## 2023-05-31 DIAGNOSIS — Z00.00 WELCOME TO MEDICARE PREVENTIVE VISIT: ICD-10-CM

## 2023-05-31 DIAGNOSIS — Z00.00 ROUTINE GENERAL MEDICAL EXAMINATION AT HEALTH CARE FACILITY: Primary | ICD-10-CM

## 2023-05-31 DIAGNOSIS — Z13.6 SCREENING FOR CARDIOVASCULAR CONDITION: ICD-10-CM

## 2023-05-31 DIAGNOSIS — Z98.61 CAD S/P PERCUTANEOUS CORONARY ANGIOPLASTY: ICD-10-CM

## 2023-05-31 DIAGNOSIS — K21.9 GASTROESOPHAGEAL REFLUX DISEASE WITHOUT ESOPHAGITIS: ICD-10-CM

## 2023-05-31 DIAGNOSIS — Z95.2 S/P TAVR (TRANSCATHETER AORTIC VALVE REPLACEMENT): ICD-10-CM

## 2023-05-31 DIAGNOSIS — I25.10 CAD S/P PERCUTANEOUS CORONARY ANGIOPLASTY: ICD-10-CM

## 2023-05-31 DIAGNOSIS — M54.6 ACUTE MIDLINE THORACIC BACK PAIN: ICD-10-CM

## 2023-05-31 PROCEDURE — 99213 OFFICE O/P EST LOW 20 MIN: CPT | Performed by: FAMILY MEDICINE

## 2023-05-31 PROCEDURE — 1159F MED LIST DOCD IN RCRD: CPT | Performed by: FAMILY MEDICINE

## 2023-05-31 PROCEDURE — 1036F TOBACCO NON-USER: CPT | Performed by: FAMILY MEDICINE

## 2023-05-31 PROCEDURE — G0403 EKG FOR INITIAL PREVENT EXAM: HCPCS | Performed by: FAMILY MEDICINE

## 2023-05-31 PROCEDURE — G0402 INITIAL PREVENTIVE EXAM: HCPCS | Performed by: FAMILY MEDICINE

## 2023-05-31 PROCEDURE — 1170F FXNL STATUS ASSESSED: CPT | Performed by: FAMILY MEDICINE

## 2023-05-31 PROCEDURE — 1160F RVW MEDS BY RX/DR IN RCRD: CPT | Performed by: FAMILY MEDICINE

## 2023-05-31 PROCEDURE — 3077F SYST BP >= 140 MM HG: CPT | Performed by: FAMILY MEDICINE

## 2023-05-31 PROCEDURE — 3080F DIAST BP >= 90 MM HG: CPT | Performed by: FAMILY MEDICINE

## 2023-05-31 RX ORDER — ASPIRIN 81 MG/1
81 TABLET ORAL 3 TIMES WEEKLY
COMMUNITY

## 2023-05-31 ASSESSMENT — PATIENT HEALTH QUESTIONNAIRE - PHQ9
1. LITTLE INTEREST OR PLEASURE IN DOING THINGS: NOT AT ALL
2. FEELING DOWN, DEPRESSED OR HOPELESS: NOT AT ALL
SUM OF ALL RESPONSES TO PHQ9 QUESTIONS 1 AND 2: 0

## 2023-05-31 ASSESSMENT — ENCOUNTER SYMPTOMS
OCCASIONAL FEELINGS OF UNSTEADINESS: 0
LOSS OF SENSATION IN FEET: 0
BACK PAIN: 1
DEPRESSION: 0

## 2023-05-31 ASSESSMENT — ACTIVITIES OF DAILY LIVING (ADL)
DOING_HOUSEWORK: INDEPENDENT
MANAGING_FINANCES: INDEPENDENT
BATHING: INDEPENDENT
DRESSING: INDEPENDENT
GROCERY_SHOPPING: INDEPENDENT
TAKING_MEDICATION: INDEPENDENT

## 2023-05-31 NOTE — PROGRESS NOTES
"Subjective   Reason for Visit: Emiliano Mann is an 63 y.o. male here for a Medicare Wellness visit.     Past Medical, Surgical, and Family History reviewed and updated in chart.    Reviewed all medications by prescribing practitioner or clinical pharmacist (such as prescriptions, OTCs, herbal therapies and supplements) and documented in the medical record.    Back Pain  This is a new problem. The current episode started more than 1 month ago (2 months). The problem has been gradually worsening since onset. The pain is present in the thoracic spine (betweenn sholderblades in the middle). He has tried chiropractic manipulation (massage) for the symptoms.   There has been no injury or fall. There is also intermittent numbness radiating down both arms to the 4th and 5th fingers. He has a history of cervical laminectomy C2-3 after a fall from a ladder in his 20s.     Patient Care Team:  Arnoldo Mota MD as PCP - General  Arnoldo Mota MD as PCP - [facility] PCP     Review of Systems   Musculoskeletal:  Positive for back pain.       Objective   Vitals:  BP (!) 174/106   Pulse 72   Temp 37 °C (98.6 °F)   Resp 16   Ht 1.727 m (5' 8\")   Wt 97.5 kg (215 lb)   BMI 32.69 kg/m²       Physical Exam  Constitutional:       Appearance: Normal appearance.   HENT:      Head: Normocephalic.   Eyes:      Conjunctiva/sclera: Conjunctivae normal.   Cardiovascular:      Rate and Rhythm: Normal rate and regular rhythm.   Pulmonary:      Effort: Pulmonary effort is normal.      Breath sounds: Normal breath sounds.   Musculoskeletal:      Cervical back: Neck supple.   Skin:     General: Skin is warm and dry.   Neurological:      Mental Status: He is alert.         Assessment/Plan   Problem List Items Addressed This Visit       Back pain    Relevant Orders    XR thoracic spine 3 views    Referral to Physical Therapy    CAD S/P percutaneous coronary angioplasty    GERD (gastroesophageal reflux disease)    S/P TAVR (transcatheter " aortic valve replacement)     Other Visit Diagnoses       Routine general medical examination at health care facility    -  Primary    Welcome to Medicare preventive visit        Screening for cardiovascular condition        Relevant Orders    ECG 12 lead (Completed)          This 63-year-old male has a 2-month history of focal midline upper thoracic pain that is intermittent and severe with intermittent numbness radiating in an ulnar distribution bilaterally to his fingertips.  This is suspicious for a compression fracture or pathologic fracture and so I will get an x-ray to look for this.  If the x-ray is clear, we will refer him to physical therapy as he has failed chiropractic and massage therapy.  NSAIDs are relatively contraindicated due to his underlying coronary artery disease.  He is already taking over-the-counter turmeric

## 2023-06-05 DIAGNOSIS — M54.6 ACUTE MIDLINE THORACIC BACK PAIN: Primary | ICD-10-CM

## 2023-09-08 PROBLEM — I20.9 ANGINA, CLASS III (CMS-HCC): Status: ACTIVE | Noted: 2023-09-08

## 2023-09-08 PROBLEM — H92.09 EAR PAIN: Status: ACTIVE | Noted: 2023-09-08

## 2023-09-08 PROBLEM — H81.10 BPV (BENIGN POSITIONAL VERTIGO), UNSPECIFIED LATERALITY: Status: ACTIVE | Noted: 2023-09-08

## 2023-09-08 PROBLEM — H81.03 ENDOLYMPHATIC HYDROPS OF BOTH EARS: Status: ACTIVE | Noted: 2023-09-08

## 2023-09-08 PROBLEM — E66.811 CLASS 1 OBESITY WITH BODY MASS INDEX (BMI) OF 31.0 TO 31.9 IN ADULT: Status: ACTIVE | Noted: 2023-09-08

## 2023-09-08 PROBLEM — M79.674 PAIN IN TOES OF BOTH FEET: Status: ACTIVE | Noted: 2023-05-09

## 2023-09-08 PROBLEM — B35.1 ONYCHOMYCOSIS: Status: ACTIVE | Noted: 2023-05-09

## 2023-09-08 PROBLEM — I20.9 ANGINA, CLASS IV (CMS-HCC): Status: ACTIVE | Noted: 2023-09-08

## 2023-09-08 PROBLEM — E66.9 CLASS 1 OBESITY WITH BODY MASS INDEX (BMI) OF 31.0 TO 31.9 IN ADULT: Status: ACTIVE | Noted: 2023-09-08

## 2023-09-08 PROBLEM — D48.5 NEOPLASM OF UNCERTAIN BEHAVIOR OF SKIN: Status: ACTIVE | Noted: 2023-09-08

## 2023-09-08 PROBLEM — R06.02 SHORTNESS OF BREATH ON EXERTION: Status: ACTIVE | Noted: 2023-09-08

## 2023-09-08 PROBLEM — L85.3 XEROSIS OF SKIN: Status: ACTIVE | Noted: 2023-05-09

## 2023-09-08 PROBLEM — M79.675 PAIN IN TOES OF BOTH FEET: Status: ACTIVE | Noted: 2023-05-09

## 2023-09-08 RX ORDER — CARVEDILOL 12.5 MG/1
1 TABLET ORAL 2 TIMES DAILY
COMMUNITY
Start: 2023-01-25 | End: 2023-10-19 | Stop reason: ALTCHOICE

## 2023-09-08 RX ORDER — AMLODIPINE BESYLATE 10 MG/1
1 TABLET ORAL DAILY
COMMUNITY
Start: 2022-11-14 | End: 2023-10-19 | Stop reason: ALTCHOICE

## 2023-09-08 RX ORDER — NITROGLYCERIN 0.4 MG/1
0.4 TABLET SUBLINGUAL EVERY 5 MIN PRN
COMMUNITY
Start: 2023-01-18

## 2023-10-12 ENCOUNTER — HOSPITAL ENCOUNTER (OUTPATIENT)
Dept: CARDIOLOGY | Facility: CLINIC | Age: 63
Discharge: HOME | End: 2023-10-12
Payer: MEDICARE

## 2023-10-12 ENCOUNTER — TELEMEDICINE (OUTPATIENT)
Dept: CARDIOLOGY | Facility: CLINIC | Age: 63
End: 2023-10-12
Payer: MEDICARE

## 2023-10-12 VITALS
SYSTOLIC BLOOD PRESSURE: 125 MMHG | DIASTOLIC BLOOD PRESSURE: 70 MMHG | HEIGHT: 68 IN | BODY MASS INDEX: 32.58 KG/M2 | WEIGHT: 215 LBS

## 2023-10-12 DIAGNOSIS — Z95.2 S/P TAVR (TRANSCATHETER AORTIC VALVE REPLACEMENT): Primary | ICD-10-CM

## 2023-10-12 DIAGNOSIS — I10 HYPERTENSION: ICD-10-CM

## 2023-10-12 LAB — EJECTION FRACTION APICAL 4 CHAMBER: 54.5

## 2023-10-12 PROCEDURE — 99212 OFFICE O/P EST SF 10 MIN: CPT

## 2023-10-12 PROCEDURE — 93306 TTE W/DOPPLER COMPLETE: CPT

## 2023-10-12 PROCEDURE — 99212 OFFICE O/P EST SF 10 MIN: CPT | Mod: 24,PO,95

## 2023-10-12 PROCEDURE — 93306 TTE W/DOPPLER COMPLETE: CPT | Performed by: INTERNAL MEDICINE

## 2023-10-12 NOTE — PROGRESS NOTES
Subjective   Emiliano Mann is a 63 y.o. male.    Chief Complaint:  One year s/p TAVR follow-up with [facility].    HPI  Mr. Mann is a 62yo M with a PMHx significant for MV CAD s/p multiple PCIs, BPH, COPD, HTN, GERD, pseudoaneurysm of RFA, and severe AS now s/p TAVR using 26mm Earline 3 via [facility] on 10/10/22 with Dr. Mejia and Dr. Kelly. POD#1 EKG: SR; HR 70, Kelly 162, . POD#1 TTE: LVEF 60-65%, no PVL/AI, peak/mean AVG 44.7/24.3, trace MR/TR, trivial PC effusion.     ROS:  MEANS when excersing  All other systems negative         KCCQ Questionnaire      1  Heart failure affects different people in different ways. Some feel shortness of breath while others feel fatigue. Please indicate how much you are limited by heart failure (shortness of breath or fatigue) in your ability to do the following activities over the past 2 weeks. 1 month [facility] NP follow-up     A.) Showering/bathing  5. Not at All  B.) Walking 1 block on level ground 5. Not at All  C.) Hurrying or Jogging   6. Limited for other reastons    2.  Over the past 2 weeks, how many times did you have swelling in your feet, ankles or legs when you woke up in the morning? 5. Never    3.  Over the past 2 weeks, on average, how many times has fatigue limited your ability to do what you wanted? 7. Never    4.  Over the past 2 weeks, on average, how many times has shortness of breath limited your ability to do what you wanted? 7. Never    5.  Over the past 2 weeks, on average, how many times have you been forced to sleep sitting up in a chair or with at least 3 pillows to prop you up because of shortness of breath? Never    6. Over the past 2 weeks, how much has your heart failure limited your enjoyment of life? It has not limited my enjoyment of life    7. If you had to spend the rest of your life with your heart failure the way it is right now, how would you feel about this? 5. Completely satisfied    8. How much does your heart failure affect  your lifestyle? Please indicate how your heart failure may have limited yourparticipation in the following activities over the past 2 weeks    A.)  Hobbies, recreational activities  5. Did not limit at all    B.) Working or doing household chores  5. Did not limit at all    C.) Visiting family or friends out of your home  5. Did not limit at all               Assessment/Plan     NYHA class I     Assessment/Impression: patient doing well clinically and denies HF signs and symptoms except for MEANS when walking uphill and sexual intercourse. VS: BP 120s/70s, HR 70s, weight: reported stable.      Plan:  - Cont current medication regimen  - Cont SAPT with 81mg ASA for life  - Cont to increase activity as tolerated  - one year f/u echo scheduled this month  - no further follow-ups with [facility], cont to follow with [facility] Dr. Harrison   - Life-long Dental SBE prophylaxis needed  - KCCQ complete    Virtual Visit.     Radha Maurice, APRN-CNP, CCRN  [facility] Team  Office Phone: [phone]  Fax: [phone]

## 2023-10-19 ENCOUNTER — OFFICE VISIT (OUTPATIENT)
Dept: CARDIOLOGY | Facility: CLINIC | Age: 63
End: 2023-10-19
Payer: MEDICARE

## 2023-10-19 VITALS
HEART RATE: 80 BPM | SYSTOLIC BLOOD PRESSURE: 136 MMHG | WEIGHT: 214.8 LBS | DIASTOLIC BLOOD PRESSURE: 84 MMHG | HEIGHT: 68 IN | BODY MASS INDEX: 32.55 KG/M2

## 2023-10-19 DIAGNOSIS — Z95.2 S/P TAVR (TRANSCATHETER AORTIC VALVE REPLACEMENT): Primary | ICD-10-CM

## 2023-10-19 DIAGNOSIS — J44.9 CHRONIC OBSTRUCTIVE PULMONARY DISEASE, UNSPECIFIED COPD TYPE (MULTI): ICD-10-CM

## 2023-10-19 DIAGNOSIS — Z87.891 FORMER SMOKER: ICD-10-CM

## 2023-10-19 DIAGNOSIS — E66.9 CLASS 1 OBESITY WITH BODY MASS INDEX (BMI) OF 32.0 TO 32.9 IN ADULT, UNSPECIFIED OBESITY TYPE, UNSPECIFIED WHETHER SERIOUS COMORBIDITY PRESENT: ICD-10-CM

## 2023-10-19 DIAGNOSIS — I25.10 CAD S/P PERCUTANEOUS CORONARY ANGIOPLASTY: ICD-10-CM

## 2023-10-19 DIAGNOSIS — E78.2 MIXED HYPERLIPIDEMIA: ICD-10-CM

## 2023-10-19 DIAGNOSIS — I10 ESSENTIAL HYPERTENSION: ICD-10-CM

## 2023-10-19 DIAGNOSIS — Z98.61 CAD S/P PERCUTANEOUS CORONARY ANGIOPLASTY: ICD-10-CM

## 2023-10-19 DIAGNOSIS — I25.2 HISTORY OF MI (MYOCARDIAL INFARCTION): ICD-10-CM

## 2023-10-19 PROCEDURE — 99214 OFFICE O/P EST MOD 30 MIN: CPT | Performed by: INTERNAL MEDICINE

## 2023-10-19 PROCEDURE — 1159F MED LIST DOCD IN RCRD: CPT | Performed by: INTERNAL MEDICINE

## 2023-10-19 PROCEDURE — 3008F BODY MASS INDEX DOCD: CPT | Performed by: INTERNAL MEDICINE

## 2023-10-19 PROCEDURE — 3075F SYST BP GE 130 - 139MM HG: CPT | Performed by: INTERNAL MEDICINE

## 2023-10-19 PROCEDURE — 3079F DIAST BP 80-89 MM HG: CPT | Performed by: INTERNAL MEDICINE

## 2023-10-19 PROCEDURE — 1160F RVW MEDS BY RX/DR IN RCRD: CPT | Performed by: INTERNAL MEDICINE

## 2023-10-19 PROCEDURE — 1036F TOBACCO NON-USER: CPT | Performed by: INTERNAL MEDICINE

## 2023-10-19 NOTE — PROGRESS NOTES
CARDIOLOGY OFFICE VISIT      CHIEF COMPLAINT      HPI  The patient states that he has been doing well.  He has been retired about 6 months and he feels very good.  He denies chest discomfort or symptoms of myocardial ischemia.  He has not used nitroglycerin.  He denies any significant dyspnea with activities.  He denies palpitation and syncope.  He states that he really overdoes things physically will have some very mild dyspnea.  I did go over results of his echocardiogram with him.  This demonstrates satisfactory TAVR and normal left ventricular systolic function.  He denies any problem with his current medications.  IMPRESSION:   1. Coronary artery disease, no angina.  2. Remote PCI.  3. Remote non-ST-segment elevation myocardial infarction.  4. Mixed hyperlipidemia, unable to tolerate statins.  5. Essential hypertension.  6. Chronic obstructive pulmonary disease.  7. Obesity.  8. Former smoker  9. s/p TAVR         Please excuse any errors in grammar or translation related to this dictation. Voice recognition software was utilized to prepare this document.         Past Medical History  Past Medical History:   Diagnosis Date    Personal history of other diseases of the circulatory system 10/20/2022    History of aortic valve stenosis       Social History  Social History     Tobacco Use    Smoking status: Former     Types: Cigarettes     Quit date:      Years since quittin.8    Smokeless tobacco: Never   Substance Use Topics    Alcohol use: Yes     Alcohol/week: 1.0 standard drink of alcohol     Types: 1 Shots of liquor per week    Drug use: Never       Family History     Family History   Problem Relation Name Age of Onset    Hyperlipidemia Mother      Cancer Father      Stroke Maternal Grandfather      Heart attack Maternal Grandfather          Allergies:  Allergies   Allergen Reactions    Clopidogrel Unknown    Ezetimibe Unknown    Lisinopril Cough    Losartan Potassium Unknown    Morphine Other     Pravachol [Pravastatin] Unknown    Statins-Hmg-Coa Reductase Inhibitors Unknown     Leg cramps        Outpatient Medications:  Current Outpatient Medications   Medication Instructions    amLODIPine (NORVASC) 10 mg, oral, Daily    amoxicillin (Amoxil) 500 mg capsule 4 capsules, oral, As needed    aspirin 81 mg, oral, Daily, 2-3x a week    carvedilol (COREG) 25 mg, oral, 2 times daily with meals    nitroglycerin (NITROSTAT) 0.4 mg, sublingual, Every 5 min PRN, DISSOLVE 1 TABLET UNDER THE TONGUE AS NEEDED FOR CHEST PAIN- MAY REPEAT EVERY 5 MINUTES IF NEEDED (MAX 3 DOSES.- IF NO RELIEF CALL 911)    omeprazole (PriLOSEC) 40 mg DR capsule 1 capsule, oral, Daily, prn    spironolactone (Aldactone) 25 mg tablet 1 tablet, oral, Daily, Once WEEKLY          REVIEW OF SYSTEMS  Review of Systems   All other systems reviewed and are negative.        VITALS  Vitals:    10/19/23 1406   BP: 154/88   Pulse: 80       PHYSICAL EXAM  Constitutional:       Appearance: Healthy appearance. Not in distress.   Eyes:      Conjunctiva/sclera: Conjunctivae normal.      Pupils: Pupils are equal, round, and reactive to light.   Neck:      Vascular: No JVR. JVD normal.   Pulmonary:      Effort: Pulmonary effort is normal.      Breath sounds: Normal breath sounds. No wheezing. No rhonchi. No rales.   Chest:      Chest wall: Not tender to palpatation.   Cardiovascular:      PMI at left midclavicular line. Normal rate. Regular rhythm. Normal S1. Normal S2.       Murmurs: There is a grade 1/6 systolic murmur. At base      No gallop.  No click. No rub.   Pulses:     Intact distal pulses.   Edema:     Peripheral edema absent.   Abdominal:      Tenderness: There is no abdominal tenderness.   Musculoskeletal: Normal range of motion.         General: No tenderness.      Cervical back: Normal range of motion. Skin:     General: Skin is warm and dry.   Neurological:      General: No focal deficit present.      Mental Status: Alert and oriented to person,  place and time.           ASSESSMENT AND PLAN  Diagnoses and all orders for this visit:  S/P TAVR (transcatheter aortic valve replacement)  CAD S/P percutaneous coronary angioplasty  History of MI (myocardial infarction)  Mixed hyperlipidemia  Essential hypertension  Chronic obstructive pulmonary disease, unspecified COPD type (CMS/[facility])  Class 1 obesity with body mass index (BMI) of 32.0 to 32.9 in adult, unspecified obesity type, unspecified whether serious comorbidity present  Former smoker      [unfilled]

## 2023-10-19 NOTE — PATIENT INSTRUCTIONS
Echocardiogram to be scheduled for 1 year  Follow up office visit in 1 year.    Continue same medications/treatment.  Patient educated on proper medication use.  Please bring all medicines, vitamins and herbal supplements with you when you come to the office.    I, Olivia Chino LPN, am scribing for and in the presence of Dr. Antoine Harrison MD, FACC

## 2023-10-31 ENCOUNTER — HOSPITAL ENCOUNTER (OUTPATIENT)
Dept: RADIOLOGY | Facility: HOSPITAL | Age: 63
Discharge: HOME | End: 2023-10-31
Payer: MEDICARE

## 2023-10-31 DIAGNOSIS — M75.100 UNSPECIFIED ROTATOR CUFF TEAR OR RUPTURE OF UNSPECIFIED SHOULDER, NOT SPECIFIED AS TRAUMATIC: ICD-10-CM

## 2024-01-25 DIAGNOSIS — I10 ESSENTIAL HYPERTENSION: ICD-10-CM

## 2024-01-25 RX ORDER — AMLODIPINE BESYLATE 5 MG/1
5 TABLET ORAL DAILY
Qty: 90 TABLET | Refills: 3 | Status: SHIPPED | OUTPATIENT
Start: 2024-01-25 | End: 2024-02-08 | Stop reason: SDUPTHER

## 2024-02-05 ENCOUNTER — APPOINTMENT (OUTPATIENT)
Dept: PRIMARY CARE | Facility: CLINIC | Age: 64
End: 2024-02-05
Payer: MEDICARE

## 2024-02-08 ENCOUNTER — OFFICE VISIT (OUTPATIENT)
Dept: PRIMARY CARE | Facility: CLINIC | Age: 64
End: 2024-02-08
Payer: MEDICARE

## 2024-02-08 VITALS
WEIGHT: 212 LBS | BODY MASS INDEX: 32.13 KG/M2 | TEMPERATURE: 98 F | HEART RATE: 84 BPM | SYSTOLIC BLOOD PRESSURE: 148 MMHG | RESPIRATION RATE: 16 BRPM | HEIGHT: 68 IN | DIASTOLIC BLOOD PRESSURE: 94 MMHG

## 2024-02-08 DIAGNOSIS — J32.9 SINUSITIS, BACTERIAL: ICD-10-CM

## 2024-02-08 DIAGNOSIS — J44.9 CHRONIC OBSTRUCTIVE PULMONARY DISEASE, UNSPECIFIED COPD TYPE (MULTI): ICD-10-CM

## 2024-02-08 DIAGNOSIS — I10 HTN (HYPERTENSION), BENIGN: Primary | ICD-10-CM

## 2024-02-08 DIAGNOSIS — M15.9 PRIMARY OSTEOARTHRITIS INVOLVING MULTIPLE JOINTS: ICD-10-CM

## 2024-02-08 DIAGNOSIS — B96.89 SINUSITIS, BACTERIAL: ICD-10-CM

## 2024-02-08 DIAGNOSIS — I10 ESSENTIAL HYPERTENSION: ICD-10-CM

## 2024-02-08 PROBLEM — Z87.891 FORMER SMOKER: Status: RESOLVED | Noted: 2023-10-19 | Resolved: 2024-02-08

## 2024-02-08 PROBLEM — I20.9 ANGINA, CLASS IV (CMS-HCC): Status: RESOLVED | Noted: 2023-09-08 | Resolved: 2024-02-08

## 2024-02-08 PROBLEM — D48.5 NEOPLASM OF UNCERTAIN BEHAVIOR OF SKIN: Status: RESOLVED | Noted: 2023-09-08 | Resolved: 2024-02-08

## 2024-02-08 PROBLEM — I25.10 ATHSCL HEART DISEASE OF NATIVE CORONARY ARTERY W/O ANG PCTRS: Status: RESOLVED | Noted: 2018-07-13 | Resolved: 2024-02-08

## 2024-02-08 PROBLEM — I20.9 ANGINA, CLASS III (CMS-HCC): Status: RESOLVED | Noted: 2023-09-08 | Resolved: 2024-02-08

## 2024-02-08 PROBLEM — M15.0 PRIMARY OSTEOARTHRITIS INVOLVING MULTIPLE JOINTS: Status: ACTIVE | Noted: 2024-02-08

## 2024-02-08 PROBLEM — M54.9 BACK PAIN: Status: RESOLVED | Noted: 2023-04-26 | Resolved: 2024-02-08

## 2024-02-08 PROBLEM — R06.02 SHORTNESS OF BREATH ON EXERTION: Status: RESOLVED | Noted: 2023-09-08 | Resolved: 2024-02-08

## 2024-02-08 PROBLEM — H92.09 EAR PAIN: Status: RESOLVED | Noted: 2023-09-08 | Resolved: 2024-02-08

## 2024-02-08 PROCEDURE — 3080F DIAST BP >= 90 MM HG: CPT | Performed by: FAMILY MEDICINE

## 2024-02-08 PROCEDURE — 1159F MED LIST DOCD IN RCRD: CPT | Performed by: FAMILY MEDICINE

## 2024-02-08 PROCEDURE — 3008F BODY MASS INDEX DOCD: CPT | Performed by: FAMILY MEDICINE

## 2024-02-08 PROCEDURE — 99214 OFFICE O/P EST MOD 30 MIN: CPT | Performed by: FAMILY MEDICINE

## 2024-02-08 PROCEDURE — 3077F SYST BP >= 140 MM HG: CPT | Performed by: FAMILY MEDICINE

## 2024-02-08 PROCEDURE — 1036F TOBACCO NON-USER: CPT | Performed by: FAMILY MEDICINE

## 2024-02-08 RX ORDER — AMLODIPINE BESYLATE 10 MG/1
10 TABLET ORAL DAILY
Qty: 90 TABLET | Refills: 3 | Status: SHIPPED | OUTPATIENT
Start: 2024-02-08 | End: 2025-02-07

## 2024-02-08 RX ORDER — AMOXICILLIN AND CLAVULANATE POTASSIUM 875; 125 MG/1; MG/1
1 TABLET, FILM COATED ORAL 2 TIMES DAILY
Qty: 14 TABLET | Refills: 0 | Status: SHIPPED | OUTPATIENT
Start: 2024-02-08 | End: 2024-02-15

## 2024-02-08 ASSESSMENT — ENCOUNTER SYMPTOMS
LIMITED RANGE OF MOTION: 1
HEADACHES: 1
SINUS PAIN: 1

## 2024-02-08 NOTE — ASSESSMENT & PLAN NOTE
Patient is normal today on repeat testing.  He has no recent interval readings.  He discontinued the spironolactone as it was causing muscle cramps which have resolved after stopping the medication.  I believe the next best step would be to raise the dose of amlodipine from 5 mg to 10 mg.  I asked him to get interval readings and let us know what they are.  We will follow-up in 6 months

## 2024-02-08 NOTE — PROGRESS NOTES
Subjective   Emiliano Mann is a 63 y.o. male who presents for Shoulder Pain and URI.  Shoulder Pain   The pain is present in the right shoulder. Episode onset: about 5 months ago. The problem has been gradually worsening. Associated symptoms include a limited range of motion.   URI   This is a recurrent problem. Episode onset: about 3 months ago. The problem has been waxing and waning. Associated symptoms include congestion, headaches, a plugged ear sensation and sinus pain.            Visit Vitals  BP (!) 148/94   Pulse 84   Temp 36.7 °C (98 °F)   Resp 16      Objective   Physical Exam  Constitutional:       Appearance: Normal appearance.   HENT:      Head: Normocephalic.   Pulmonary:      Effort: Pulmonary effort is normal.   Musculoskeletal:      Cervical back: Neck supple.   Skin:     General: Skin is warm and dry.   Psychiatric:         Mood and Affect: Mood normal.       Assessment/Plan      Problem List Items Addressed This Visit       HTN (hypertension), benign - Primary     Patient is normal today on repeat testing.  He has no recent interval readings.  He discontinued the spironolactone as it was causing muscle cramps which have resolved after stopping the medication.  I believe the next best step would be to raise the dose of amlodipine from 5 mg to 10 mg.  I asked him to get interval readings and let us know what they are.  We will follow-up in 6 months         Relevant Medications    amLODIPine (Norvasc) 10 mg tablet    Other Relevant Orders    Follow Up In [facility] - PCP - Established    Primary osteoarthritis involving multiple joints     He is describing some classic osteoarthritis which is interfering with his activities of daily living and his heart beats.  He tried turmeric but had some stomach upset with this.  He cannot take NSAIDs due to his concurrent coronary artery disease.  I recommend he start a daily dose of Tylenol arthritis and take a second dose if needed throughout the  day.  I do recommend staying active.          Other Visit Diagnoses       Essential hypertension        Relevant Medications    amLODIPine (Norvasc) 10 mg tablet    Sinusitis, bacterial        Relevant Medications    amoxicillin-pot clavulanate (Augmentin) 875-125 mg tablet        This 63-year-old male has a several month history of chronic sinus congestion with worsening aching and pain on the frontal sinuses, ear popping and jaw pain.  There are no fevers or chills to indicate systemic disease but this is a classic pattern for bacterial sinusitis.  Will treat with Augmentin

## 2024-02-08 NOTE — PATIENT INSTRUCTIONS
For arthritis joint pain try Tylenol Arthritis strength or a daily supplement of Tumeric (curcumin)

## 2024-02-16 ENCOUNTER — APPOINTMENT (OUTPATIENT)
Dept: PRIMARY CARE | Facility: CLINIC | Age: 64
End: 2024-02-16
Payer: MEDICARE

## 2024-02-27 ENCOUNTER — APPOINTMENT (OUTPATIENT)
Dept: RADIOLOGY | Facility: HOSPITAL | Age: 64
End: 2024-02-27
Payer: MEDICARE

## 2024-02-27 ENCOUNTER — APPOINTMENT (OUTPATIENT)
Dept: CARDIOLOGY | Facility: HOSPITAL | Age: 64
End: 2024-02-27
Payer: MEDICARE

## 2024-02-27 ENCOUNTER — HOSPITAL ENCOUNTER (OUTPATIENT)
Facility: HOSPITAL | Age: 64
Setting detail: OBSERVATION
Discharge: HOME | End: 2024-02-28
Attending: EMERGENCY MEDICINE | Admitting: STUDENT IN AN ORGANIZED HEALTH CARE EDUCATION/TRAINING PROGRAM
Payer: MEDICARE

## 2024-02-27 DIAGNOSIS — G45.8 OTHER TRANSIENT CEREBRAL ISCHEMIC ATTACKS AND RELATED SYNDROMES: ICD-10-CM

## 2024-02-27 DIAGNOSIS — H53.122 TRANSIENT MONOCULAR BLINDNESS, LEFT: ICD-10-CM

## 2024-02-27 DIAGNOSIS — G45.9 TIA (TRANSIENT ISCHEMIC ATTACK): Primary | ICD-10-CM

## 2024-02-27 DIAGNOSIS — Z95.2 HISTORY OF TRANSCATHETER AORTIC VALVE REPLACEMENT (TAVR): ICD-10-CM

## 2024-02-27 PROBLEM — G45.3 AMAUROSIS FUGAX OF LEFT EYE: Status: ACTIVE | Noted: 2024-02-27

## 2024-02-27 LAB
ALBUMIN SERPL BCP-MCNC: 4 G/DL (ref 3.4–5)
ALBUMIN SERPL BCP-MCNC: 4.2 G/DL (ref 3.4–5)
ALP SERPL-CCNC: 47 U/L (ref 33–136)
ALP SERPL-CCNC: 50 U/L (ref 33–136)
ALT SERPL W P-5'-P-CCNC: 22 U/L (ref 10–52)
ALT SERPL W P-5'-P-CCNC: 23 U/L (ref 10–52)
ANION GAP SERPL CALC-SCNC: 13 MMOL/L (ref 10–20)
AORTIC VALVE MEAN GRADIENT: 18 MMHG
AORTIC VALVE PEAK VELOCITY: 3.21 M/S
AST SERPL W P-5'-P-CCNC: 18 U/L (ref 9–39)
AST SERPL W P-5'-P-CCNC: 20 U/L (ref 9–39)
AV PEAK GRADIENT: 41.2 MMHG
BASOPHILS # BLD AUTO: 0.04 X10*3/UL (ref 0–0.1)
BASOPHILS NFR BLD AUTO: 0.4 %
BILIRUB DIRECT SERPL-MCNC: 0.1 MG/DL (ref 0–0.3)
BILIRUB DIRECT SERPL-MCNC: 0.1 MG/DL (ref 0–0.3)
BILIRUB SERPL-MCNC: 0.5 MG/DL (ref 0–1.2)
BILIRUB SERPL-MCNC: 0.5 MG/DL (ref 0–1.2)
BNP SERPL-MCNC: 22 PG/ML (ref 0–99)
BUN SERPL-MCNC: 17 MG/DL (ref 6–23)
CALCIUM SERPL-MCNC: 8.7 MG/DL (ref 8.6–10.3)
CARDIAC TROPONIN I PNL SERPL HS: 8 NG/L (ref 0–20)
CHLORIDE SERPL-SCNC: 104 MMOL/L (ref 98–107)
CHOLEST SERPL-MCNC: 245 MG/DL (ref 0–199)
CHOLESTEROL/HDL RATIO: 5.3
CO2 SERPL-SCNC: 23 MMOL/L (ref 21–32)
CREAT SERPL-MCNC: 1.02 MG/DL (ref 0.5–1.3)
CRP SERPL-MCNC: 0.43 MG/DL
EGFRCR SERPLBLD CKD-EPI 2021: 82 ML/MIN/1.73M*2
EJECTION FRACTION APICAL 4 CHAMBER: 57.1
EJECTION FRACTION: 55 %
EOSINOPHIL # BLD AUTO: 0.32 X10*3/UL (ref 0–0.7)
EOSINOPHIL NFR BLD AUTO: 3.3 %
ERYTHROCYTE [DISTWIDTH] IN BLOOD BY AUTOMATED COUNT: 11.8 % (ref 11.5–14.5)
ERYTHROCYTE [SEDIMENTATION RATE] IN BLOOD BY WESTERGREN METHOD: 8 MM/H (ref 0–20)
GLUCOSE SERPL-MCNC: 154 MG/DL (ref 74–99)
HCT VFR BLD AUTO: 43.6 % (ref 41–52)
HDLC SERPL-MCNC: 46.6 MG/DL
HGB BLD-MCNC: 15.4 G/DL (ref 13.5–17.5)
IMM GRANULOCYTES # BLD AUTO: 0.04 X10*3/UL (ref 0–0.7)
IMM GRANULOCYTES NFR BLD AUTO: 0.4 % (ref 0–0.9)
INR PPP: 1 (ref 0.9–1.1)
LDLC SERPL CALC-MCNC: 139 MG/DL
LEFT ATRIUM VOLUME AREA LENGTH INDEX BSA: 36.2 ML/M2
LEFT VENTRICLE INTERNAL DIMENSION DIASTOLE: 5.16 CM (ref 3.5–6)
LYMPHOCYTES # BLD AUTO: 1.84 X10*3/UL (ref 1.2–4.8)
LYMPHOCYTES NFR BLD AUTO: 18.8 %
MCH RBC QN AUTO: 31 PG (ref 26–34)
MCHC RBC AUTO-ENTMCNC: 35.3 G/DL (ref 32–36)
MCV RBC AUTO: 88 FL (ref 80–100)
MITRAL VALVE E/A RATIO: 0.67
MITRAL VALVE E/E' RATIO: 12.23
MONOCYTES # BLD AUTO: 0.61 X10*3/UL (ref 0.1–1)
MONOCYTES NFR BLD AUTO: 6.2 %
NEUTROPHILS # BLD AUTO: 6.92 X10*3/UL (ref 1.2–7.7)
NEUTROPHILS NFR BLD AUTO: 70.9 %
NON HDL CHOLESTEROL: 198 MG/DL (ref 0–149)
NRBC BLD-RTO: 0 /100 WBCS (ref 0–0)
PLATELET # BLD AUTO: 230 X10*3/UL (ref 150–450)
POTASSIUM SERPL-SCNC: 3.7 MMOL/L (ref 3.5–5.3)
PROT SERPL-MCNC: 6.2 G/DL (ref 6.4–8.2)
PROT SERPL-MCNC: 6.7 G/DL (ref 6.4–8.2)
PROTHROMBIN TIME: 11.1 SECONDS (ref 9.8–12.8)
RBC # BLD AUTO: 4.96 X10*6/UL (ref 4.5–5.9)
RIGHT VENTRICLE FREE WALL PEAK S': 19.4 CM/S
RIGHT VENTRICLE PEAK SYSTOLIC PRESSURE: 17.6 MMHG
SODIUM SERPL-SCNC: 136 MMOL/L (ref 136–145)
TRICUSPID ANNULAR PLANE SYSTOLIC EXCURSION: 2.6 CM
TRIGL SERPL-MCNC: 298 MG/DL (ref 0–149)
VLDL: 60 MG/DL (ref 0–40)
WBC # BLD AUTO: 9.8 X10*3/UL (ref 4.4–11.3)

## 2024-02-27 PROCEDURE — 70547 MR ANGIOGRAPHY NECK W/O DYE: CPT | Performed by: STUDENT IN AN ORGANIZED HEALTH CARE EDUCATION/TRAINING PROGRAM

## 2024-02-27 PROCEDURE — 70544 MR ANGIOGRAPHY HEAD W/O DYE: CPT | Performed by: STUDENT IN AN ORGANIZED HEALTH CARE EDUCATION/TRAINING PROGRAM

## 2024-02-27 PROCEDURE — 70551 MRI BRAIN STEM W/O DYE: CPT | Performed by: STUDENT IN AN ORGANIZED HEALTH CARE EDUCATION/TRAINING PROGRAM

## 2024-02-27 PROCEDURE — 96372 THER/PROPH/DIAG INJ SC/IM: CPT

## 2024-02-27 PROCEDURE — 84484 ASSAY OF TROPONIN QUANT: CPT | Performed by: EMERGENCY MEDICINE

## 2024-02-27 PROCEDURE — 80076 HEPATIC FUNCTION PANEL: CPT | Mod: CCI | Performed by: EMERGENCY MEDICINE

## 2024-02-27 PROCEDURE — 93306 TTE W/DOPPLER COMPLETE: CPT

## 2024-02-27 PROCEDURE — 70450 CT HEAD/BRAIN W/O DYE: CPT

## 2024-02-27 PROCEDURE — 70551 MRI BRAIN STEM W/O DYE: CPT

## 2024-02-27 PROCEDURE — 85610 PROTHROMBIN TIME: CPT | Performed by: EMERGENCY MEDICINE

## 2024-02-27 PROCEDURE — 36415 COLL VENOUS BLD VENIPUNCTURE: CPT | Performed by: STUDENT IN AN ORGANIZED HEALTH CARE EDUCATION/TRAINING PROGRAM

## 2024-02-27 PROCEDURE — 85652 RBC SED RATE AUTOMATED: CPT | Performed by: STUDENT IN AN ORGANIZED HEALTH CARE EDUCATION/TRAINING PROGRAM

## 2024-02-27 PROCEDURE — 80053 COMPREHEN METABOLIC PANEL: CPT | Performed by: EMERGENCY MEDICINE

## 2024-02-27 PROCEDURE — G0378 HOSPITAL OBSERVATION PER HR: HCPCS

## 2024-02-27 PROCEDURE — 71045 X-RAY EXAM CHEST 1 VIEW: CPT | Performed by: RADIOLOGY

## 2024-02-27 PROCEDURE — 85025 COMPLETE CBC W/AUTO DIFF WBC: CPT | Performed by: EMERGENCY MEDICINE

## 2024-02-27 PROCEDURE — 99222 1ST HOSP IP/OBS MODERATE 55: CPT | Performed by: STUDENT IN AN ORGANIZED HEALTH CARE EDUCATION/TRAINING PROGRAM

## 2024-02-27 PROCEDURE — 2500000001 HC RX 250 WO HCPCS SELF ADMINISTERED DRUGS (ALT 637 FOR MEDICARE OP): Performed by: STUDENT IN AN ORGANIZED HEALTH CARE EDUCATION/TRAINING PROGRAM

## 2024-02-27 PROCEDURE — 93880 EXTRACRANIAL BILAT STUDY: CPT

## 2024-02-27 PROCEDURE — 70547 MR ANGIOGRAPHY NECK W/O DYE: CPT

## 2024-02-27 PROCEDURE — 93005 ELECTROCARDIOGRAM TRACING: CPT

## 2024-02-27 PROCEDURE — 93880 EXTRACRANIAL BILAT STUDY: CPT | Performed by: RADIOLOGY

## 2024-02-27 PROCEDURE — 93306 TTE W/DOPPLER COMPLETE: CPT | Performed by: INTERNAL MEDICINE

## 2024-02-27 PROCEDURE — 83880 ASSAY OF NATRIURETIC PEPTIDE: CPT | Performed by: STUDENT IN AN ORGANIZED HEALTH CARE EDUCATION/TRAINING PROGRAM

## 2024-02-27 PROCEDURE — 99223 1ST HOSP IP/OBS HIGH 75: CPT | Performed by: PSYCHIATRY & NEUROLOGY

## 2024-02-27 PROCEDURE — 86140 C-REACTIVE PROTEIN: CPT | Performed by: STUDENT IN AN ORGANIZED HEALTH CARE EDUCATION/TRAINING PROGRAM

## 2024-02-27 PROCEDURE — 71045 X-RAY EXAM CHEST 1 VIEW: CPT

## 2024-02-27 PROCEDURE — 2500000004 HC RX 250 GENERAL PHARMACY W/ HCPCS (ALT 636 FOR OP/ED): Performed by: STUDENT IN AN ORGANIZED HEALTH CARE EDUCATION/TRAINING PROGRAM

## 2024-02-27 PROCEDURE — 70450 CT HEAD/BRAIN W/O DYE: CPT | Performed by: RADIOLOGY

## 2024-02-27 PROCEDURE — 99285 EMERGENCY DEPT VISIT HI MDM: CPT | Mod: 25 | Performed by: EMERGENCY MEDICINE

## 2024-02-27 PROCEDURE — 70544 MR ANGIOGRAPHY HEAD W/O DYE: CPT

## 2024-02-27 PROCEDURE — 82248 BILIRUBIN DIRECT: CPT | Performed by: STUDENT IN AN ORGANIZED HEALTH CARE EDUCATION/TRAINING PROGRAM

## 2024-02-27 PROCEDURE — 80061 LIPID PANEL: CPT | Performed by: STUDENT IN AN ORGANIZED HEALTH CARE EDUCATION/TRAINING PROGRAM

## 2024-02-27 PROCEDURE — 2500000001 HC RX 250 WO HCPCS SELF ADMINISTERED DRUGS (ALT 637 FOR MEDICARE OP): Performed by: NURSE PRACTITIONER

## 2024-02-27 RX ORDER — CARVEDILOL 12.5 MG/1
25 TABLET ORAL ONCE
Status: DISCONTINUED | OUTPATIENT
Start: 2024-02-27 | End: 2024-02-27

## 2024-02-27 RX ORDER — NAPROXEN 500 MG/1
500 TABLET ORAL EVERY 12 HOURS PRN
COMMUNITY
End: 2024-03-12

## 2024-02-27 RX ORDER — CARVEDILOL 12.5 MG/1
25 TABLET ORAL
Status: DISCONTINUED | OUTPATIENT
Start: 2024-02-27 | End: 2024-02-28 | Stop reason: HOSPADM

## 2024-02-27 RX ORDER — ASPIRIN 81 MG/1
81 TABLET ORAL 3 TIMES WEEKLY
Status: DISCONTINUED | OUTPATIENT
Start: 2024-02-28 | End: 2024-02-27

## 2024-02-27 RX ORDER — BISMUTH SUBSALICYLATE 262 MG
1 TABLET,CHEWABLE ORAL 3 TIMES WEEKLY
COMMUNITY

## 2024-02-27 RX ORDER — ASPIRIN 81 MG/1
81 TABLET ORAL DAILY
Status: DISCONTINUED | OUTPATIENT
Start: 2024-02-27 | End: 2024-02-28 | Stop reason: HOSPADM

## 2024-02-27 RX ORDER — AMLODIPINE BESYLATE 5 MG/1
10 TABLET ORAL DAILY
Status: DISCONTINUED | OUTPATIENT
Start: 2024-02-27 | End: 2024-02-28 | Stop reason: HOSPADM

## 2024-02-27 RX ORDER — CHOLECALCIFEROL (VITAMIN D3) 125 MCG
5000 CAPSULE ORAL DAILY
COMMUNITY

## 2024-02-27 RX ORDER — TALC
6 POWDER (GRAM) TOPICAL NIGHTLY PRN
Status: DISCONTINUED | OUTPATIENT
Start: 2024-02-27 | End: 2024-02-28 | Stop reason: HOSPADM

## 2024-02-27 RX ORDER — ENOXAPARIN SODIUM 100 MG/ML
40 INJECTION SUBCUTANEOUS EVERY 24 HOURS
Status: DISCONTINUED | OUTPATIENT
Start: 2024-02-27 | End: 2024-02-28 | Stop reason: HOSPADM

## 2024-02-27 RX ADMIN — ENOXAPARIN SODIUM 40 MG: 40 INJECTION SUBCUTANEOUS at 10:42

## 2024-02-27 RX ADMIN — ASPIRIN 81 MG: 81 TABLET, COATED ORAL at 10:41

## 2024-02-27 RX ADMIN — CARVEDILOL 25 MG: 12.5 TABLET, FILM COATED ORAL at 20:49

## 2024-02-27 RX ADMIN — Medication 6 MG: at 21:39

## 2024-02-27 SDOH — SOCIAL STABILITY: SOCIAL INSECURITY: HAVE YOU HAD THOUGHTS OF HARMING ANYONE ELSE?: YES

## 2024-02-27 SDOH — SOCIAL STABILITY: SOCIAL INSECURITY: HAS ANYONE EVER THREATENED TO HURT YOUR FAMILY OR YOUR PETS?: NO

## 2024-02-27 SDOH — SOCIAL STABILITY: SOCIAL INSECURITY: DO YOU FEEL UNSAFE GOING BACK TO THE PLACE WHERE YOU ARE LIVING?: NO

## 2024-02-27 SDOH — SOCIAL STABILITY: SOCIAL INSECURITY: ABUSE: ADULT

## 2024-02-27 SDOH — SOCIAL STABILITY: SOCIAL INSECURITY: DOES ANYONE TRY TO KEEP YOU FROM HAVING/CONTACTING OTHER FRIENDS OR DOING THINGS OUTSIDE YOUR HOME?: NO

## 2024-02-27 SDOH — SOCIAL STABILITY: SOCIAL INSECURITY: ARE THERE ANY APPARENT SIGNS OF INJURIES/BEHAVIORS THAT COULD BE RELATED TO ABUSE/NEGLECT?: NO

## 2024-02-27 SDOH — SOCIAL STABILITY: SOCIAL INSECURITY: ARE YOU OR HAVE YOU BEEN THREATENED OR ABUSED PHYSICALLY, EMOTIONALLY, OR SEXUALLY BY ANYONE?: NO

## 2024-02-27 SDOH — SOCIAL STABILITY: SOCIAL INSECURITY: WERE YOU ABLE TO COMPLETE ALL THE BEHAVIORAL HEALTH SCREENINGS?: YES

## 2024-02-27 SDOH — SOCIAL STABILITY: SOCIAL INSECURITY: DO YOU FEEL ANYONE HAS EXPLOITED OR TAKEN ADVANTAGE OF YOU FINANCIALLY OR OF YOUR PERSONAL PROPERTY?: NO

## 2024-02-27 ASSESSMENT — ENCOUNTER SYMPTOMS
TROUBLE SWALLOWING: 0
AGITATION: 0
SINUS PRESSURE: 0
VOMITING: 0
TREMORS: 0
SLEEP DISTURBANCE: 0
LIGHT-HEADEDNESS: 0
JOINT SWELLING: 0
HALLUCINATIONS: 0
FEVER: 0
SEIZURES: 0
ADENOPATHY: 0
COUGH: 0
NUMBNESS: 0
BRUISES/BLEEDS EASILY: 0
SHORTNESS OF BREATH: 0
NECK STIFFNESS: 0
FACIAL ASYMMETRY: 0
WHEEZING: 0
CONFUSION: 0
ARTHRALGIAS: 0
WEAKNESS: 0
FREQUENCY: 0
PHOTOPHOBIA: 0
UNEXPECTED WEIGHT CHANGE: 0
PALPITATIONS: 0
NAUSEA: 0
ABDOMINAL PAIN: 0
FATIGUE: 0
BACK PAIN: 0
SPEECH DIFFICULTY: 0
EYE PAIN: 0
DIFFICULTY URINATING: 0
NECK PAIN: 0
HEADACHES: 0
HYPERACTIVE: 0
DIZZINESS: 0

## 2024-02-27 ASSESSMENT — COGNITIVE AND FUNCTIONAL STATUS - GENERAL
DAILY ACTIVITIY SCORE: 24
MOBILITY SCORE: 24
PATIENT BASELINE BEDBOUND: NO

## 2024-02-27 ASSESSMENT — LIFESTYLE VARIABLES
EVER FELT BAD OR GUILTY ABOUT YOUR DRINKING: NO
HAVE YOU EVER FELT YOU SHOULD CUT DOWN ON YOUR DRINKING: NO
HAVE PEOPLE ANNOYED YOU BY CRITICIZING YOUR DRINKING: NO
AUDIT-C TOTAL SCORE: 2
HOW MANY STANDARD DRINKS CONTAINING ALCOHOL DO YOU HAVE ON A TYPICAL DAY: 1 OR 2
HOW OFTEN DO YOU HAVE A DRINK CONTAINING ALCOHOL: 2-4 TIMES A MONTH
HOW OFTEN DO YOU HAVE 6 OR MORE DRINKS ON ONE OCCASION: NEVER
SKIP TO QUESTIONS 9-10: 1
AUDIT-C TOTAL SCORE: 2
EVER HAD A DRINK FIRST THING IN THE MORNING TO STEADY YOUR NERVES TO GET RID OF A HANGOVER: NO

## 2024-02-27 ASSESSMENT — PAIN SCALES - GENERAL
PAINLEVEL_OUTOF10: 0 - NO PAIN

## 2024-02-27 ASSESSMENT — ACTIVITIES OF DAILY LIVING (ADL)
WALKS IN HOME: INDEPENDENT
GROOMING: INDEPENDENT
HEARING - RIGHT EAR: FUNCTIONAL
ADEQUATE_TO_COMPLETE_ADL: YES
HEARING - LEFT EAR: FUNCTIONAL
PATIENT'S MEMORY ADEQUATE TO SAFELY COMPLETE DAILY ACTIVITIES?: YES
JUDGMENT_ADEQUATE_SAFELY_COMPLETE_DAILY_ACTIVITIES: YES
LACK_OF_TRANSPORTATION: NO
FEEDING YOURSELF: INDEPENDENT
TOILETING: INDEPENDENT
BATHING: INDEPENDENT
DRESSING YOURSELF: INDEPENDENT

## 2024-02-27 ASSESSMENT — PATIENT HEALTH QUESTIONNAIRE - PHQ9
SUM OF ALL RESPONSES TO PHQ9 QUESTIONS 1 & 2: 0
1. LITTLE INTEREST OR PLEASURE IN DOING THINGS: NOT AT ALL
2. FEELING DOWN, DEPRESSED OR HOPELESS: NOT AT ALL

## 2024-02-27 ASSESSMENT — VISUAL ACUITY
OU: 20/25
OS: 20/30
OD: 20/40

## 2024-02-27 ASSESSMENT — PAIN - FUNCTIONAL ASSESSMENT
PAIN_FUNCTIONAL_ASSESSMENT: 0-10
PAIN_FUNCTIONAL_ASSESSMENT: 0-10

## 2024-02-27 ASSESSMENT — COLUMBIA-SUICIDE SEVERITY RATING SCALE - C-SSRS
1. IN THE PAST MONTH, HAVE YOU WISHED YOU WERE DEAD OR WISHED YOU COULD GO TO SLEEP AND NOT WAKE UP?: NO
2. HAVE YOU ACTUALLY HAD ANY THOUGHTS OF KILLING YOURSELF?: NO
6. HAVE YOU EVER DONE ANYTHING, STARTED TO DO ANYTHING, OR PREPARED TO DO ANYTHING TO END YOUR LIFE?: NO

## 2024-02-27 NOTE — ED PROVIDER NOTES
HPI   Chief Complaint   Patient presents with    Eye Problem     Pt has been having vision issues off and on for weeks.  He was seen by his eye Dr yesterday and instructed to come to the ER to rule out stroke but he did not come yesterday.  Pt states he has no vision loss today.  Pt NIH is 0 in triage       Patient is a 63-year-old male with history of hypertension aortic valve stenosis comes in complaining of intermittent loss of vision in the left eye gets blurry to the point he can hardly see anything lasts 3 to 5 minutes at a time and then comes back saw his eye doctor yesterday who dilated his eyes and his eyes were perfectly fine and was sent in for evaluation.                          [place] Coma Scale Score: 15         NIH Stroke Scale: 0             Patient History   Past Medical History:   Diagnosis Date    Personal history of other diseases of the circulatory system 10/20/2022    History of aortic valve stenosis     Past Surgical History:   Procedure Laterality Date    OTHER SURGICAL HISTORY  10/27/2020    Cholecystectomy    OTHER SURGICAL HISTORY  10/27/2020    Cervical laminectomy    OTHER SURGICAL HISTORY  10/27/2020    Colonoscopy    OTHER SURGICAL HISTORY  2022    Percutaneous transluminal coronary angioplasty    OTHER SURGICAL HISTORY  2022    Cardiac catheterization with stent placement    OTHER SURGICAL HISTORY  2022    Tonsillectomy with adenoidectomy     Family History   Problem Relation Name Age of Onset    Hyperlipidemia Mother      Cancer Father      Stroke Maternal Grandfather      Heart attack Maternal Grandfather       Social History     Tobacco Use    Smoking status: Former     Types: Cigarettes     Quit date:      Years since quittin.1    Smokeless tobacco: Never   Substance Use Topics    Alcohol use: Yes     Alcohol/week: 1.0 standard drink of alcohol     Types: 1 Shots of liquor per week    Drug use: Never       Physical Exam   ED Triage Vitals [24  0655]   Temperature Heart Rate Respirations BP   36.5 °C (97.7 °F) 77 18 160/87      Pulse Ox Temp Source Heart Rate Source Patient Position   98 % Temporal -- Sitting      BP Location FiO2 (%)     Right arm --       Physical Exam  Vitals and nursing note reviewed. Exam conducted with a chaperone present.   Constitutional:       Appearance: Normal appearance.   HENT:      Head: Normocephalic and atraumatic.      Nose: Nose normal.      Mouth/Throat:      Mouth: Mucous membranes are moist.   Eyes:      Pupils: Pupils are equal, round, and reactive to light.   Cardiovascular:      Rate and Rhythm: Normal rate and regular rhythm.      Heart sounds: Murmur heard.   Pulmonary:      Effort: Pulmonary effort is normal.   Abdominal:      Palpations: Abdomen is soft.   Musculoskeletal:         General: Normal range of motion.      Cervical back: Normal range of motion.   Skin:     General: Skin is warm and dry.      Capillary Refill: Capillary refill takes less than 2 seconds.   Neurological:      General: No focal deficit present.      Mental Status: He is alert and oriented to person, place, and time.      Cranial Nerves: No cranial nerve deficit.      Sensory: No sensory deficit.      Motor: No weakness.      Coordination: Coordination normal.   Psychiatric:         Mood and Affect: Mood normal.         ED Course & MDM   Diagnoses as of 02/27/24 0843   TIA (transient ischemic attack)       Medical Decision Making  EKG interpreted by me normal sinus rhythm rate of 70, normal QRS and normal ST segments    My differential diagnosis  TIA, CVA, unlikely to be ophthalmology related and has since like vitreous floaters as patient has been seen by ophthalmology and cleared completely by them.  At this time I ordered a CBC chemistry EKG cardiac enzymes CT scan of the brain and further workup and management to be done based upon the results of the test ordered  Labs Reviewed  BASIC METABOLIC PANEL - Abnormal     Glucose                        154 (*)                Sodium                        136                    Potassium                     3.7                    Chloride                      104                    Bicarbonate                   23                     Anion Gap                     13                     Urea Nitrogen                 17                     Creatinine                    1.02                   eGFR                          82                     Calcium                       8.7                 HEPATIC FUNCTION PANEL - Normal     Albumin                       4.2                    Bilirubin, Total              0.5                    Bilirubin, Direct             0.1                    Alkaline Phosphatase          50                     ALT                           23                     AST                           20                     Total Protein                 6.7                 PROTIME-INR - Normal     Protime                       11.1                   INR                           1.0                 TROPONIN I, HIGH SENSITIVITY - Normal     Troponin I, High Sensitivity   8                        Narrative: Less than 99th percentile of normal range cutoff-                Female and children under 18 years old <14 ng/L; Male <21 ng/L: Negative                Repeat testing should be performed if clinically indicated.                                 Female and children under 18 years old 14-50 ng/L; Male 21-50 ng/L:                Consistent with possible cardiac damage and possible increased clinical                 risk. Serial measurements may help to assess extent of myocardial damage.                                 >50 ng/L: Consistent with cardiac damage, increased clinical risk and                myocardial infarction. Serial measurements may help assess extent of                 myocardial damage.                                  NOTE: Children less than 1 year old may have higher  baseline troponin                 levels and results should be interpreted in conjunction with the overall                 clinical context.                                 NOTE: Troponin I testing is performed using a different                 testing methodology at [facility] than at other                 [facility] hospitals. Direct result comparisons should only                 be made within the same method.  CBC WITH AUTO DIFFERENTIAL     XR chest 1 view   Final Result    Minimal subsegmental atelectasis in the right lung base.          Mild cardiomegaly.          Otherwise no sign of acute cardiopulmonary disease.          Signed by: Mikey Valencia 2/27/2024 8:01 AM    Dictation workstation:   [id number]     CT head wo IV contrast   Final Result    No CT evidence of acute intracranial abnormality or interval change    from 11/29/2022.          Signed by: Mikey Valencia 2/27/2024 8:00 AM    Dictation workstation:   [id number]      This time discussed with the patient all the workup was unremarkable patient's symptoms of blurriness and vision loss that have been intermittent are concerning for a TIA and will be admitted to hospital for close monitoring any further intervention is necessary        Procedure  Procedures     Edward Wade MD  02/27/24 0896

## 2024-02-27 NOTE — H&P
[facility] History and Physical  ASSESSMENT & PLAN:    #TMVL of the left eye   #mixed hyperlipidemia with statin intolerance  - carotid ultrasound as well as echocardiogram were ordered to evaluate for most common causes of TMVL.   -  temporal arteritis less likely given normal ESR CRP with no pain involved and vision changes.   Ocular migraine also less likely given no history.  -  Neurology consulted to assess for neurological changes and need for additional workup such as MRI.  -Patient's with a Plavix intolerance.  Will discuss other options with neurology.  - will discuss options with patient and start on appropriate medication to assist with bringing down his lipid levels     #CAD status post remote NSTEMI with PCI  #Aortic valve stenosis status post TAVR  #Hypertension  #COPD  - will continue home medications   -  recently seen and evaluated by cardiology.  Can follow-up as outpatient    VTE Prophylaxis: Lovenox  Diet: Cardiac  CODE STATUS: Full code    >55 minutes  were spent preparing to see patient, obtaining and reviewing history, performing appropriate examination and/or evaluation, counseling and educating the patient family or caregiver, ordering medications tests or procedures, referring and communicating with other healthcare professionals, documenting clinical information in the EHR, independently interpreting results and communicating results to the patient, family or caregiver, care coordination.  > 50% of time spent counseling and/ or coordinating care    ---Of note, this documentation is completed using the Dragon Dictation system (voice recognition software). There may be spelling and/or grammatical errors that were not corrected prior to final submission.---    Vandana Hare MD    HISTORY OF PRESENT ILLNESS:   Chief Complaint:   Intermittent left eye vision loss    History Of Present Illness:    Emiliano Mann is a 63 y.o. male with a significant past medical history of CAD status  post remote NSTEMI with PCI,  aortic valve stenosis status post TAVR, former smoker,hypertension, mixed hyperlipidemia with statin intolerance, COPD, obesity  who is coming in with a 2-week history of intermittent vision loss.  Patient reports that over the past 2 weeks he has consistently had blurriness in the medial lower quadrant of the left eye.  He has had 2 episodes of near complete vision loss during this time.  With the most recent being 2 days ago.  He reports that he had an episode where his vision became very blurry particularly central vision throughout the left eye where he could not make out the outline of people and objects but could not see them clearly.  Patient went to his ophthalmologist yesterday who did an exam and determined that this was not primarily an ophthalmologic issue and his estimation and that patient should be further evaluated.  Patient denies any headaches, dizziness, weakness, facial droop, lid lag, facial numbness.  He does note some spasming of the ocular muscles.     in the emergency room vitals were notable for a elevated blood pressure of 160/87.  Labs showed elevated glucose at 154 LDL at 139, VLDL 60, triglycerides 298, non-HDL cholesterol 198 and total cholesterol at 245.  BNP was 22 CRP was 0.43  ESR was 8.   CT head showed some parenchymal atrophy mostly age-related.  Chest x-ray did not show any new or concerning findings.   Due to patient's multiple risk factors patient was admitted for observation and to be evaluated by neurology.     Review of systems: 10 point review of systems is otherwise negative except as mentioned above.    PAST HISTORIES:     Past Medical History:  He has a past medical history of Personal history of other diseases of the circulatory system (10/20/2022).    Past Surgical History:  He has a past surgical history that includes Other surgical history (10/27/2020); Other surgical history (10/27/2020); Other surgical history (10/27/2020); Other  "surgical history (04/13/2022); Other surgical history (04/13/2022); and Other surgical history (04/13/2022).      Social History:  He reports that he quit smoking about 24 years ago. His smoking use included cigarettes. He has never used smokeless tobacco. He reports current alcohol use of about 1.0 standard drink of alcohol per week. He reports that he does not use drugs.    Family History:  Family History   Problem Relation Name Age of Onset    Hyperlipidemia Mother      Cancer Father      Stroke Maternal Grandfather      Heart attack Maternal Grandfather          Allergies:  Clopidogrel, Ezetimibe, Lisinopril, Losartan potassium, Morphine, Pravachol [pravastatin], and Statins-hmg-coa reductase inhibitors    OBJECTIVE:     Last Recorded Vitals:  Vitals:    02/27/24 0655 02/27/24 0828 02/27/24 0945   BP: 160/87 107/67 134/85   BP Location: Right arm     Patient Position: Sitting     Pulse: 77 77 75   Resp: 18 18 18   Temp: 36.5 °C (97.7 °F)     TempSrc: Temporal     SpO2: 98% 98% 99%   Weight: 94.3 kg (208 lb)     Height: 1.727 m (5' 8\")       Last I/O:  No intake/output data recorded.    Physical Exam  Constitutional:       Appearance: Normal appearance.   HENT:      Head: Normocephalic.      Nose: Congestion present.      Mouth/Throat:      Pharynx: Oropharynx is clear.   Eyes:      General: Visual field deficit (medial lower quadrant of left eye) present.      Extraocular Movements: Extraocular movements intact.      Pupils: Pupils are equal, round, and reactive to light.   Cardiovascular:      Rate and Rhythm: Regular rhythm.      Pulses: Normal pulses.      Heart sounds: Normal heart sounds.   Pulmonary:      Effort: Pulmonary effort is normal.      Breath sounds: Normal breath sounds.      Comments:  distant breath sounds  Abdominal:      General: Abdomen is flat.      Palpations: Abdomen is soft.   Musculoskeletal:         General: No swelling. Normal range of motion.   Skin:     Capillary Refill: Capillary " refill takes less than 2 seconds.   Neurological:      General: No focal deficit present.      Mental Status: He is alert and oriented to person, place, and time.   Psychiatric:         Mood and Affect: Mood normal.         Behavior: Behavior normal.         Scheduled Medications  aspirin, 81 mg, oral, Daily  enoxaparin, 40 mg, subcutaneous, q24h  perflutren lipid microspheres, 0.5-10 mL of dilution, intravenous, Once in imaging  perflutren protein A microsphere, 0.5 mL, intravenous, Once in imaging  sulfur hexafluoride microsphr, 2 mL, intravenous, Once in imaging      PRN Medications    Continuous Medications       Outpatient Medications:  Prior to Admission medications    Medication Sig Start Date End Date Taking? Authorizing Provider   amLODIPine (Norvasc) 10 mg tablet Take 1 tablet (10 mg) by mouth once daily. 2/8/24 2/7/25  Arnoldo Mota MD   amoxicillin (Amoxil) 500 mg capsule Take all four capsules 30-60 minutes before dental procedure for a total of 2g. 1/29/24   Radha Maurice APRN-CNP   aspirin 81 mg EC tablet Take 1 tablet (81 mg) by mouth early in the morning.. 2-3x a week    Historical Provider, MD   carvedilol (Coreg) 25 mg tablet Take 1 tablet (25 mg) by mouth 2 times a day with meals. 4/23/23   Historical Provider, MD   nitroglycerin (Nitrostat) 0.4 mg SL tablet Place 1 tablet (0.4 mg) under the tongue every 5 minutes if needed for chest pain. DISSOLVE 1 TABLET UNDER THE TONGUE AS NEEDED FOR CHEST PAIN- MAY REPEAT EVERY 5 MINUTES IF NEEDED (MAX 3 DOSES.- IF NO RELIEF CALL 911) 1/18/23   Historical Provider, MD   omeprazole (PriLOSEC) 40 mg DR capsule Take 1 capsule (40 mg) by mouth once daily. prn 9/16/15   Historical Provider, MD       LABS AND IMAGING:     Labs:  Results from last 7 days   Lab Units 02/27/24  0733   WBC AUTO x10*3/uL 9.8   RBC AUTO x10*6/uL 4.96   HEMOGLOBIN g/dL 15.4   HEMATOCRIT % 43.6   MCV fL 88   MCH pg 31.0   MCHC g/dL 35.3   RDW % 11.8   PLATELETS AUTO x10*3/uL 230      Results from last 7 days   Lab Units 02/27/24  0913 02/27/24  0733   SODIUM mmol/L  --  136   POTASSIUM mmol/L  --  3.7   CHLORIDE mmol/L  --  104   CO2 mmol/L  --  23   BUN mg/dL  --  17   CREATININE mg/dL  --  1.02   GLUCOSE mg/dL  --  154*   PROTEIN TOTAL g/dL 6.2* 6.7   CALCIUM mg/dL  --  8.7   BILIRUBIN TOTAL mg/dL 0.5 0.5   ALK PHOS U/L 47 50   AST U/L 18 20   ALT U/L 22 23         Results from last 7 days   Lab Units 02/27/24  0733   TROPHS ng/L 8       Imaging:  Vascular US carotid artery duplex bilateral  Narrative: Interpreted By:  Regan Ramirez,   STUDY:  [facility] US CAROTID ARTERY DUPLEX BILATERAL;  2/27/2024 10:18 am      INDICATION:  Signs/Symptoms:transient  mononcular vision loss.      COMPARISON:  None.      ACCESSION NUMBER(S):  [id number]      ORDERING CLINICIAN:  RADHA TAMAYO      TECHNIQUE:  Vascular ultrasound of the extracranial carotid system was performed  bilaterally.  Gray scale, color Doppler and spectral Doppler waveform  analysis was performed.      FINDINGS:  RIGHT:          RIGHT SIDE PEAK SYSTOLIC VELOCITIES:  CCA (distal): 62 cm/sec. There is mild intimal thickening.  ICA: 54 cm/sec. There is minimal plaque formation at the carotid bulb.  ECA: 123 cm/sec.      END-DIASTOLIC VELOCITY  Common carotid artery : 18 cm/sec  Internal carotid:22 cm/sec      The ratio of the peak systolic velocity of the right ICA/CCA is 0.9.      RIGHT VERTEBRAL ARTERY:  Antegrade flow      LEFT:          LEFT SIDE PEAK SYSTOLIC VELOCITIES:  CCA (distal): 82 cm/sec. There is mild intimal thickening, and  tortuosity. ICA: 83 cm/sec. Mild atherosclerotic plaque at the  carotid bulb and proximal segment of the internal carotid artery,  greater than on the right. ECA: 131 cm/sec.      END-DIASTOLIC VELOCITY  Common carotid artery : 10 cm/sec  Internal carotid:33 cm/sec      The ratio of the peak systolic velocity of the left ICA/CCA is 1.      LEFT VERTEBRAL ARTERY:  Antegrade flow      Impression: Mild  atherosclerotic plaque at the carotid bulbs, greater on the  left, and at the left internal carotid artery proximally with less  than 50% diameter stenosis.      The velocity criteria are extrapolated from diameter data as defined  by the Society of Radiologists in Ultrasound Consensus Conference  Radiology 2003; 229;340-346.      Signed by: Regan Ramirez 2/27/2024 10:51 AM  Dictation workstation:   [id number]  XR chest 1 view  Narrative: Interpreted By:  Mikey Valencia,   STUDY:  XR CHEST 1 VIEW;  2/27/2024 7:52 am      INDICATION:  Signs/Symptoms:weak.      COMPARISON:  01/04/2023      ACCESSION NUMBER(S):  [id number]      ORDERING CLINICIAN:  TERRANCE MARINELLI      TECHNIQUE:  A portable radiograph of the chest is performed. The study is limited  by lordotic positioning.      FINDINGS:  The heart is mildly enlarged. There is minimal subsegmental  atelectasis in the right lung base. The lungs and pleural spaces are  otherwise clear. There is no pleural effusion or pneumothorax. The  pulmonary vessels are within normal limits.      Impression: Minimal subsegmental atelectasis in the right lung base.      Mild cardiomegaly.      Otherwise no sign of acute cardiopulmonary disease.      Signed by: Mikey Valencia 2/27/2024 8:01 AM  Dictation workstation:   [id number]  CT head wo IV contrast  Narrative: Interpreted By:  Mikey Valencia,   STUDY:  CT HEAD WO IV CONTRAST;  2/27/2024 7:44 am      INDICATION:  Signs/Symptoms:weak.      COMPARISON:  11/29/2022      ACCESSION NUMBER(S):  [id number]      ORDERING CLINICIAN:  TERRANCE MARINELLI      TECHNIQUE:  Contiguous unenhanced axial CT sections are performed from the skull  base to the vertex.      FINDINGS:  The osseous structures are intact. There is mild mucoperiosteal  thickening of the ethmoid air cells.      There is mild to moderate generalized parenchymal atrophy with  symmetric enlargement of the cortical sulci, more pronounced over the  frontal  convexities. This appearance is unchanged in the interval.      There is no sign of parenchymal hematoma or dense extra-axial fluid  collection. There is no localized edema, mass effect, or shift of the  midline. The gray matter/white-matter differentiation is preserved.      Impression: No CT evidence of acute intracranial abnormality or interval change  from 11/29/2022.      Signed by: Mikey Valencia 2/27/2024 8:00 AM  Dictation workstation:   [id number]  ECG 12 lead  Normal sinus rhythm  Normal ECG  When compared with ECG of 04-JAN-2023 07:47,  Previous ECG has undetermined rhythm, needs review

## 2024-02-28 ENCOUNTER — APPOINTMENT (OUTPATIENT)
Dept: NEUROLOGY | Facility: HOSPITAL | Age: 64
End: 2024-02-28
Payer: MEDICARE

## 2024-02-28 VITALS
HEART RATE: 72 BPM | OXYGEN SATURATION: 96 % | WEIGHT: 214.51 LBS | BODY MASS INDEX: 32.51 KG/M2 | SYSTOLIC BLOOD PRESSURE: 141 MMHG | DIASTOLIC BLOOD PRESSURE: 90 MMHG | HEIGHT: 68 IN | RESPIRATION RATE: 16 BRPM | TEMPERATURE: 98.2 F

## 2024-02-28 PROCEDURE — 2500000004 HC RX 250 GENERAL PHARMACY W/ HCPCS (ALT 636 FOR OP/ED): Performed by: STUDENT IN AN ORGANIZED HEALTH CARE EDUCATION/TRAINING PROGRAM

## 2024-02-28 PROCEDURE — 99239 HOSP IP/OBS DSCHRG MGMT >30: CPT | Performed by: STUDENT IN AN ORGANIZED HEALTH CARE EDUCATION/TRAINING PROGRAM

## 2024-02-28 PROCEDURE — G0378 HOSPITAL OBSERVATION PER HR: HCPCS

## 2024-02-28 PROCEDURE — 95816 EEG AWAKE AND DROWSY: CPT | Performed by: PSYCHIATRY & NEUROLOGY

## 2024-02-28 PROCEDURE — 2500000001 HC RX 250 WO HCPCS SELF ADMINISTERED DRUGS (ALT 637 FOR MEDICARE OP): Performed by: STUDENT IN AN ORGANIZED HEALTH CARE EDUCATION/TRAINING PROGRAM

## 2024-02-28 PROCEDURE — 99233 SBSQ HOSP IP/OBS HIGH 50: CPT | Performed by: PSYCHIATRY & NEUROLOGY

## 2024-02-28 PROCEDURE — 95816 EEG AWAKE AND DROWSY: CPT

## 2024-02-28 PROCEDURE — 2500000001 HC RX 250 WO HCPCS SELF ADMINISTERED DRUGS (ALT 637 FOR MEDICARE OP): Performed by: NURSE PRACTITIONER

## 2024-02-28 RX ADMIN — CARVEDILOL 25 MG: 12.5 TABLET, FILM COATED ORAL at 08:52

## 2024-02-28 RX ADMIN — AMLODIPINE BESYLATE 10 MG: 5 TABLET ORAL at 08:52

## 2024-02-28 RX ADMIN — ENOXAPARIN SODIUM 40 MG: 40 INJECTION SUBCUTANEOUS at 08:52

## 2024-02-28 RX ADMIN — ASPIRIN 81 MG: 81 TABLET, COATED ORAL at 08:52

## 2024-02-28 ASSESSMENT — COGNITIVE AND FUNCTIONAL STATUS - GENERAL
MOBILITY SCORE: 24
DAILY ACTIVITIY SCORE: 24

## 2024-02-28 ASSESSMENT — PAIN SCALES - GENERAL: PAINLEVEL_OUTOF10: 0 - NO PAIN

## 2024-02-28 ASSESSMENT — PAIN - FUNCTIONAL ASSESSMENT: PAIN_FUNCTIONAL_ASSESSMENT: 0-10

## 2024-02-28 NOTE — PROGRESS NOTES
"Emiliano Mann is a 63 y.o. male on day 0 of admission presenting with Transient monocular blindness, left.    Subjective   Patient is feeling better.  No new weakness numbness or visual difficulty.  No headache nausea vomiting or any seizure or seizure-like activity.       Last Recorded Vitals  Blood pressure 141/90, pulse 72, temperature 36.8 °C (98.2 °F), resp. rate 16, height 1.727 m (5' 8\"), weight 97.3 kg (214 lb 8.1 oz), SpO2 96 %.    Physical Exam/Neurological Exam  Constitutional: General appearance: no acute distress   Auscultation of Heart: Regular rate and rhythm, no murmurs, normal S1 and S2.   Carotid Arteries: Intact without any bruits.   Neck is supple.   No lymph adenopathy.   Peripheral Vascular Exam: Pulses +2 and equal in all extremities. No swelling, varicosities, edema or tenderness to palpations.    Abdomen is soft, nondistended. No organomegaly.  Mental status: The patient was in no distress, alert, interactive and cooperative. Affect is appropriate.   Orientation: oriented to person, oriented to place and oriented to time.   Memory: recent memory intact and remote memory intact.   Attention: normal attention span and normal concentrating ability.   Language: normal comprehension and no difficulty naming common objects.   Fund of knowledge: Patient displays adequate knowledge of current events, adequate fund of knowledge regarding past history and adequate fund of knowledge regarding vocabulary.   Eyes: The ophthalmoscopic examination was normal. The fundi are visualized with normal disc margins and without.  Cranial nerve II: Visual fields full to confrontation.   Cranial nerves III, IV, and VI: Pupils round, equally reactive to light; no ptosis. EOMs intact. No nystagmus.   Cranial Nerve V: Facial sensation intact bilaterally.   Cranial nerve VII: Normal and symmetric facial strength.   Cranial nerve VIII: Hearing is intact bilaterally to finger rub / whisper.   Cranial nerves IX and X: " Palate elevates symmetrically.   Cranial nerve XI: Shoulder shrug and neck rotation strength are intact.   Cranial nerve XII: Tongue midline with normal strength.   Motor: Motor exam was normal. Muscle bulk was normal in both upper and lower extremities. Muscle tone was normal in both upper and lower extremities. Muscle strength was 5/5 throughout. no abnormal or adventitious movements were present.   Deep Tendon Reflexes: left biceps 2+ , right biceps 2+, left triceps 2+, right triceps 2+, left brachioradialis 2+, right brachioradialis 2+, left patella 2+, right patella 2+, left ankle jerk 2+, right ankle jerk 2+   Plantar Reflex: Toes downgoing to plantar stimulation on the left. Toes downgoing to plantar stimulation on the right.   Sensory Exam: Normal to light touch.   Coordination: There is no limb dystaxia and rapid alternating movements are intact.  Gait: Gait is normal without spasticity, ataxia or bradykinesia. Stance is stable with a negative Romberg.  Relevant Results  Recent Results (from the past 24 hour(s))   Transthoracic Echo (TTE) Complete    Collection Time: 02/27/24  2:28 PM   Result Value Ref Range    AV mn grad 18.0 mmHg    AV pk eduar 3.21 m/s    LV biplane EF 55 %    MV E/A ratio 0.67     Tricuspid annular plane systolic excursion 2.6 cm    MV avg E/e' ratio 12.23     LA vol index A/L 36.2 ml/m2    RV free wall pk S' 19.40 cm/s    RVSP 17.6 mmHg    LVIDd 5.16 cm    AV pk grad 41.2 mmHg    LV A4C EF 57.1       NIH Stroke Scale  1A. Level of Consciousness: Alert, Keenly Responsive  1B. Ask Month and Age: Both Questions Right  1C. Blink Eyes & Squeeze Hands: Performs Both Tasks  2. Best Gaze: Normal  3. Visual: No Visual Loss  4. Facial Palsy: Normal Symmetrical Movements  5A. Motor - Left Arm: No Drift  5B. Motor - Right Arm: No Drift  6A. Motor - Left Leg: No Drift  6B. Motor - Right Leg: No Drift  7. Limb Ataxia: Absent  8. Sensory Loss: Normal  9. Best Language: No Aphasia  10. Dysarthria:  Normal  11. Extinction and Inattention: No Abnormality  NIH Stroke Scale: 0           [place] Coma Scale  Best Eye Response: Spontaneous  Best Verbal Response: Oriented  Best Motor Response: Follows commands  [place] Coma Scale Score: 15                EEG    Result Date: 2/28/2024  IMPRESSION This routine EEG is normal. No epileptiform discharges or lateralizing signs are seen. However, clinical correlation is strongly recommended. This report has been interpreted and electronically signed by    MR brain wo IV contrast    Result Date: 2/27/2024  Interpreted By:  Cory Cobb, STUDY: MR BRAIN WO IV CONTRAST; MR ANGIO HEAD WO IV CONTRAST; MR ANGIO NECK WO IV CONTRAST;  2/27/2024 7:53 pm   INDICATION: Signs/Symptoms:cva.   COMPARISON: CT head dated 02/27/2024   ACCESSION NUMBER(S): [id number]; [id number]; [id number]   ORDERING CLINICIAN: ERICKA LUNA   TECHNIQUE: Axial T2, FLAIR, DWI, gradient echo T2 and  sagittal and coronal T1 weighted images of brain were acquired.   Time-of-flight MRA of the head  and neck was performed. The images were reviewed as source images and maximum intensity projections.   FINDINGS: Brain:   No diffusion restriction abnormality is present to suggest an acute infarct.   There is no evidence of intracranial hemorrhage. No mass effect or midline shift is present. No abnormal hemosiderin staining is noted.   No ventricular dilatation is present. Basal cisterns are patent. No extra-axial fluid collections are identified.   Scattered foci of hyperintense FLAIR and T2 signal are present in the periventricular and subcortical white matter of bilateral cerebral hemispheres, nonspecific findings favored to represent sequela of microvascular disease.   Mild mucosal thickening/secretions are present in the inferior maxillary sinuses bilaterally. No abnormal fluid signal is present in the mastoid air cells.   MRA of head:   MRA source images are somewhat degraded by motion, especially  at the carotid bifurcations.   There is symmetrically diminished flow enhancement in the cavernous and ophthalmic segments bilaterally, likely secondary to atherosclerotic changes and turbulent flow without evidence of occlusion.   The anterior cerebral arteries demonstrate symmetric flow enhancement bilaterally without evidence of occlusion.   Visualized proximal middle cerebral arteries demonstrate symmetric flow enhancement, without significant stenosis in the M1 segments or focal vessel occlusion in the more distal cortical M2 branches.   There is absent flow enhancement in the intracranial left vertebral artery, which does not demonstrate flow void on the T2 weighted images, likely representing chronic occlusion. Right posteroinferior cerebellar artery demonstrates flow enhancement, although no discrete left posteroinferior cerebellar artery is identified.   MRA of neck:   MRA source images are somewhat degraded by motion.   Common carotid arteries demonstrate somewhat diminished flow enhancement bilaterally likely artifactual without evidence of occlusion.   Mild narrowing of the appears to be present at the left carotid bifurcation without evidence of focal occlusion or high-grade stenosis in the extracranial internal carotid arteries bilaterally.   Anatomic variant dominant right vertebral artery is present, without evidence of significant stenosis or occlusion in the right vertebral artery. The left vertebral artery is diminutive in appearance, with some flow enhancement proximally, although no flow enhancement is identified in the more distal extracranial V3 segment.       MRI Brain: 1.  No evidence of diffusion restriction to suggest an acute infarct, or other emergent intracranial abnormalities. 2. Scattered foci of hyperintense FLAIR and T2 signal are present in the periventricular and subcortical white matter of bilateral cerebral hemispheres, nonspecific findings favored to represent sequela of  microvascular disease.   MRA: 1.  Absent flow enhancement in the intracranial left vertebral artery, suspected to represent a time indeterminate and possibly chronic occlusion. Dominant right vertebral artery supplies the basilar artery, with preserved flow enhancement of the posterior cerebral circulation distally. No left posteroinferior cerebellar artery is identified. 2. No evidence of significant stenosis or occlusion in the visualized anterior cerebral circulation. 3. Mild narrowing may be present in the extracranial left internal carotid artery just distal to the bifurcation due to atherosclerotic changes, without evidence of occlusion in the carotid arteries bilaterally. 4. Flow enhancement is present in the diminutive left vertebral artery proximally, although no definite flow enhancement is identified in the most distal extracranial V3 segment, suspected to represent an underlying stenosis/occlusion.   MACRO: None   Signed by: Cory Cobb 2/27/2024 10:50 PM Dictation workstation:   [id number]    MR angio neck wo IV contrast    Result Date: 2/27/2024  Interpreted By:  Cory Cobb, STUDY: MR BRAIN WO IV CONTRAST; MR ANGIO HEAD WO IV CONTRAST; MR ANGIO NECK WO IV CONTRAST;  2/27/2024 7:53 pm   INDICATION: Signs/Symptoms:cva.   COMPARISON: CT head dated 02/27/2024   ACCESSION NUMBER(S): [id number]; [id number]; [id number]   ORDERING CLINICIAN: ERICKA LUNA   TECHNIQUE: Axial T2, FLAIR, DWI, gradient echo T2 and  sagittal and coronal T1 weighted images of brain were acquired.   Time-of-flight MRA of the head  and neck was performed. The images were reviewed as source images and maximum intensity projections.   FINDINGS: Brain:   No diffusion restriction abnormality is present to suggest an acute infarct.   There is no evidence of intracranial hemorrhage. No mass effect or midline shift is present. No abnormal hemosiderin staining is noted.   No ventricular dilatation is present. Basal  cisterns are patent. No extra-axial fluid collections are identified.   Scattered foci of hyperintense FLAIR and T2 signal are present in the periventricular and subcortical white matter of bilateral cerebral hemispheres, nonspecific findings favored to represent sequela of microvascular disease.   Mild mucosal thickening/secretions are present in the inferior maxillary sinuses bilaterally. No abnormal fluid signal is present in the mastoid air cells.   MRA of head:   MRA source images are somewhat degraded by motion, especially at the carotid bifurcations.   There is symmetrically diminished flow enhancement in the cavernous and ophthalmic segments bilaterally, likely secondary to atherosclerotic changes and turbulent flow without evidence of occlusion.   The anterior cerebral arteries demonstrate symmetric flow enhancement bilaterally without evidence of occlusion.   Visualized proximal middle cerebral arteries demonstrate symmetric flow enhancement, without significant stenosis in the M1 segments or focal vessel occlusion in the more distal cortical M2 branches.   There is absent flow enhancement in the intracranial left vertebral artery, which does not demonstrate flow void on the T2 weighted images, likely representing chronic occlusion. Right posteroinferior cerebellar artery demonstrates flow enhancement, although no discrete left posteroinferior cerebellar artery is identified.   MRA of neck:   MRA source images are somewhat degraded by motion.   Common carotid arteries demonstrate somewhat diminished flow enhancement bilaterally likely artifactual without evidence of occlusion.   Mild narrowing of the appears to be present at the left carotid bifurcation without evidence of focal occlusion or high-grade stenosis in the extracranial internal carotid arteries bilaterally.   Anatomic variant dominant right vertebral artery is present, without evidence of significant stenosis or occlusion in the right vertebral  artery. The left vertebral artery is diminutive in appearance, with some flow enhancement proximally, although no flow enhancement is identified in the more distal extracranial V3 segment.       MRI Brain: 1.  No evidence of diffusion restriction to suggest an acute infarct, or other emergent intracranial abnormalities. 2. Scattered foci of hyperintense FLAIR and T2 signal are present in the periventricular and subcortical white matter of bilateral cerebral hemispheres, nonspecific findings favored to represent sequela of microvascular disease.   MRA: 1.  Absent flow enhancement in the intracranial left vertebral artery, suspected to represent a time indeterminate and possibly chronic occlusion. Dominant right vertebral artery supplies the basilar artery, with preserved flow enhancement of the posterior cerebral circulation distally. No left posteroinferior cerebellar artery is identified. 2. No evidence of significant stenosis or occlusion in the visualized anterior cerebral circulation. 3. Mild narrowing may be present in the extracranial left internal carotid artery just distal to the bifurcation due to atherosclerotic changes, without evidence of occlusion in the carotid arteries bilaterally. 4. Flow enhancement is present in the diminutive left vertebral artery proximally, although no definite flow enhancement is identified in the most distal extracranial V3 segment, suspected to represent an underlying stenosis/occlusion.   MACRO: None   Signed by: Cory Cobb 2/27/2024 10:50 PM Dictation workstation:   [id number]    MR angio head wo IV contrast    Result Date: 2/27/2024  Interpreted By:  Cory Cobb, STUDY: MR BRAIN WO IV CONTRAST; MR ANGIO HEAD WO IV CONTRAST; MR ANGIO NECK WO IV CONTRAST;  2/27/2024 7:53 pm   INDICATION: Signs/Symptoms:cva.   COMPARISON: CT head dated 02/27/2024   ACCESSION NUMBER(S): [id number]; [id number]; [id number]   ORDERING CLINICIAN: ERICKA LUNA    TECHNIQUE: Axial T2, FLAIR, DWI, gradient echo T2 and  sagittal and coronal T1 weighted images of brain were acquired.   Time-of-flight MRA of the head  and neck was performed. The images were reviewed as source images and maximum intensity projections.   FINDINGS: Brain:   No diffusion restriction abnormality is present to suggest an acute infarct.   There is no evidence of intracranial hemorrhage. No mass effect or midline shift is present. No abnormal hemosiderin staining is noted.   No ventricular dilatation is present. Basal cisterns are patent. No extra-axial fluid collections are identified.   Scattered foci of hyperintense FLAIR and T2 signal are present in the periventricular and subcortical white matter of bilateral cerebral hemispheres, nonspecific findings favored to represent sequela of microvascular disease.   Mild mucosal thickening/secretions are present in the inferior maxillary sinuses bilaterally. No abnormal fluid signal is present in the mastoid air cells.   MRA of head:   MRA source images are somewhat degraded by motion, especially at the carotid bifurcations.   There is symmetrically diminished flow enhancement in the cavernous and ophthalmic segments bilaterally, likely secondary to atherosclerotic changes and turbulent flow without evidence of occlusion.   The anterior cerebral arteries demonstrate symmetric flow enhancement bilaterally without evidence of occlusion.   Visualized proximal middle cerebral arteries demonstrate symmetric flow enhancement, without significant stenosis in the M1 segments or focal vessel occlusion in the more distal cortical M2 branches.   There is absent flow enhancement in the intracranial left vertebral artery, which does not demonstrate flow void on the T2 weighted images, likely representing chronic occlusion. Right posteroinferior cerebellar artery demonstrates flow enhancement, although no discrete left posteroinferior cerebellar artery is identified.    MRA of neck:   MRA source images are somewhat degraded by motion.   Common carotid arteries demonstrate somewhat diminished flow enhancement bilaterally likely artifactual without evidence of occlusion.   Mild narrowing of the appears to be present at the left carotid bifurcation without evidence of focal occlusion or high-grade stenosis in the extracranial internal carotid arteries bilaterally.   Anatomic variant dominant right vertebral artery is present, without evidence of significant stenosis or occlusion in the right vertebral artery. The left vertebral artery is diminutive in appearance, with some flow enhancement proximally, although no flow enhancement is identified in the more distal extracranial V3 segment.       MRI Brain: 1.  No evidence of diffusion restriction to suggest an acute infarct, or other emergent intracranial abnormalities. 2. Scattered foci of hyperintense FLAIR and T2 signal are present in the periventricular and subcortical white matter of bilateral cerebral hemispheres, nonspecific findings favored to represent sequela of microvascular disease.   MRA: 1.  Absent flow enhancement in the intracranial left vertebral artery, suspected to represent a time indeterminate and possibly chronic occlusion. Dominant right vertebral artery supplies the basilar artery, with preserved flow enhancement of the posterior cerebral circulation distally. No left posteroinferior cerebellar artery is identified. 2. No evidence of significant stenosis or occlusion in the visualized anterior cerebral circulation. 3. Mild narrowing may be present in the extracranial left internal carotid artery just distal to the bifurcation due to atherosclerotic changes, without evidence of occlusion in the carotid arteries bilaterally. 4. Flow enhancement is present in the diminutive left vertebral artery proximally, although no definite flow enhancement is identified in the most distal extracranial V3 segment, suspected to  represent an underlying stenosis/occlusion.   MACRO: None   Signed by: Cory Cobb 2/27/2024 10:50 PM Dictation workstation:   [id number]    Transthoracic Echo (TTE) Complete    Result Date: 2/27/2024          [place] [address]  Tel [phone] Fax [phone] TRANSTHORACIC ECHOCARDIOGRAM REPORT  Patient Name:      PRATIK MARTINEZ       Reading Physician:    [id number] Mikey Lucia DO Study Date:        2/27/2024            Ordering Provider:    [id number] RADHA TAMAYO MRN/PID:           [id number]             Fellow: Accession#:        [id number]         Nurse:                Chiki Gutierrez RN Date of Birth/Age: 1960 / 63 years Sonographer:          Ena Pacheco RDCS Gender:            M                    Additional Staff: Height:            172.72 cm            Admit Date:           2/27/2024 Weight:            94.35 kg             Admission Status:     Inpatient -                                                               Routine BSA / BMI:         2.08 m2 / 31.63      Department Location:  [place] [address]                                      Echo Lab Blood Pressure: 134 /85 mmHg Study Type:    TRANSTHORACIC ECHO (TTE) COMPLETE Diagnosis/ICD: Transient cerebral ischemic attack, unspecified (NOT on                LCD)-G45.9; Other transient cerebral ischemic attacks and related                syndromes-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 Pertinent History: CAD, HTN, Hyperlipidemia and Hx MI, Transient Monocular                    Blindness - Left Eye; S/P TAVR (#26 De [place]re Earline 3), PCIs.                    S/P TAVR. Study Detail: The following Echo studies were  performed: 2D, M-Mode, Doppler and               color flow. Technically challenging study due to prominent lung               artifact. Agitated saline used as a contrast agent for intraseptal               flow evaluation.  PHYSICIAN INTERPRETATION: Left Ventricle: Left ventricular systolic function is normal, with an estimated ejection fraction of 55%. There are no regional wall motion abnormalities. The left ventricular cavity size is normal. There is mild to moderate concentric left ventricular hypertrophy. Spectral Doppler shows an impaired relaxation pattern of left ventricular diastolic filling. LV Wall Scoring: All segments are normal. Left Atrium: The left atrium is mildly dilated. A bubble study using agitated saline was performed. Bubble study is negative. Right Ventricle: The right ventricle is normal in size. There is normal right ventricular global systolic function. Right Atrium: The right atrium is normal in size. Aortic Valve: There is a prosthetic aortic valve present. There is no evidence of aortic valve stenosis. There is an De La Torre transcatheter aortic valve replacement, with a 26 mm reported size. Echo findings are consistent with normal aortic valve prosthesis structure and function. There is no evidence of aortic valve regurgitation. The peak instantaneous gradient of the aortic valve is 41.2 mmHg. The mean gradient of the aortic valve is 18.0 mmHg. Mitral Valve: The mitral valve is normal in structure. There is no evidence of mitral valve stenosis. There is normal mitral valve leaflet mobility. There is trace mitral valve regurgitation. Tricuspid Valve: The tricuspid valve is structurally normal. There is normal tricuspid valve leaflet mobility. There is trace tricuspid regurgitation. Pulmonic Valve: The pulmonic valve is structurally normal. There is no indication of pulmonic valve regurgitation. Pericardium: There is no pericardial effusion noted. Aorta: The aortic root is normal.  Pulmonary Artery: The main pulmonary artery is normal in size, and position, with normal bifurcation into the left and right pulmonary arteries. Systemic Veins: The inferior vena cava appears to be of normal size. In comparison to the previous echocardiogram(s): Prior examinations are available and were reviewed for comparison purposes. The left ventricular function is unchanged. The left ventricular hypertrophy is worse. The left ventricular diastolic function is unchanged.  CONCLUSIONS:  1. Left ventricular systolic function is normal with a 55% estimated ejection fraction.  2. Spectral Doppler shows an impaired relaxation pattern of left ventricular diastolic filling.  3. There is no evidence of mitral valve stenosis.  4. Trace mitral valve regurgitation.  5. Trace tricuspid regurgitation is visualized.  6. Aortic valve stenosis is not present.  7. There is a transcatheter aortic valve replacement.  8. The main pulmonary artery is normal in size, and position, with normal bifurcation into the left and right pulmonary arteries. RECOMMENDATIONS: Technically suboptimal and limited study, therefore accuracy of above interpretation could be substantially diminished. Clinical correlation is advised. Consider additional imaging modalities if clinically indicated. Transthoracic echo has low negative predictive value for mass, vegetation, or embolic source. Consider JUDIT or MRI if clinically indicated.  QUANTITATIVE DATA SUMMARY: 2D MEASUREMENTS:                           Normal Ranges: Ao Root d:     3.40 cm    (2.0-3.7cm) IVSd:          1.35 cm    (0.6-1.1cm) LVPWd:         1.19 cm    (0.6-1.1cm) LVIDd:         5.16 cm    (3.9-5.9cm) LVIDs:         2.32 cm LV Mass Index: 128.1 g/m2 LV % FS        55.0 % LA VOLUME:                               Normal Ranges: LA Vol A4C:        66.0 ml    (22+/-6mL/m2) LA Vol A2C:        76.9 ml LA Vol BP:         75.2 ml LA Vol Index A4C:  31.8ml/m2 LA Vol Index A2C:  37.0 ml/m2 LA Vol  Index BP:   36.2 ml/m2 LA Area A4C:       20.1 cm2 LA Area A2C:       22.9 cm2 LA Major Axis A4C: 5.2 cm LA Major Axis A2C: 5.8 cm LA Volume Index:   34.6 ml/m2 LA Vol A4C:        64.0 ml LA Vol A2C:        74.0 ml RA VOLUME BY A/L METHOD:                               Normal Ranges: RA Vol A4C:        43.0 ml    (8.3-19.5ml) RA Vol Index A4C:  20.7 ml/m2 RA Area A4C:       15.9 cm2 RA Major Axis A4C: 5.0 cm LV SYSTOLIC FUNCTION BY 2D PLANIMETRY (MOD):                     Normal Ranges: EF-A4C View: 57.1 % (>=55%) EF-A2C View: 55.0 % EF-Biplane:  55.0 % LV DIASTOLIC FUNCTION:                        Normal Ranges: MV Peak E:    0.80 m/s (0.7-1.2 m/s) MV Peak A:    1.19 m/s (0.42-0.7 m/s) E/A Ratio:    0.67     (1.0-2.2) MV e'         0.06 m/s (>8.0) MV lateral e' 0.07 m/s MV medial e'  0.06 m/s E/e' Ratio:   12.23    (<8.0) MITRAL VALVE:                 Normal Ranges: MV DT: 230 msec (150-240msec) AORTIC VALVE:                                    Normal Ranges: AoV Vmax:                3.21 m/s  (<=1.7m/s) AoV Peak P.2 mmHg (<20mmHg) AoV Mean P.0 mmHg (1.7-11.5mmHg) LVOT Max Escobar:            0.96 m/s  (<=1.1m/s) AoV VTI:                 60.80 cm  (18-25cm) LVOT VTI:                19.00 cm AoV Dimensionless Index: 0.31  RIGHT VENTRICLE: TAPSE: 26.0 mm RV s'  0.19 m/s TRICUSPID VALVE/RVSP:                             Normal Ranges: Peak TR Velocity: 1.91 m/s RV Syst Pressure: 17.6 mmHg (< 30mmHg) PULMONIC VALVE:                         Normal Ranges: PV Accel Time: 108 msec (>120ms) PV Max Escobar:    0.8 m/s  (0.6-0.9m/s) PV Max P.5 mmHg PV Mean P.0 mmHg PV VTI:        16.70 cm  [id number] Mikey Lucia DO Electronically signed on 2024 at 3:15:00 PM  Wall Scoring  ** Final **     Vascular US carotid artery duplex bilateral    Result Date: 2024  Interpreted By:  Regan Ramirez, STUDY: [facility] US CAROTID ARTERY DUPLEX BILATERAL;  2024 10:18 am   INDICATION:  Signs/Symptoms:transient  mononcular vision loss.   COMPARISON: None.   ACCESSION NUMBER(S): [id number]   ORDERING CLINICIAN: RADHA TAMAYO   TECHNIQUE: Vascular ultrasound of the extracranial carotid system was performed bilaterally.  Gray scale, color Doppler and spectral Doppler waveform analysis was performed.   FINDINGS: RIGHT:     RIGHT SIDE PEAK SYSTOLIC VELOCITIES: CCA (distal): 62 cm/sec. There is mild intimal thickening. ICA: 54 cm/sec. There is minimal plaque formation at the carotid bulb. ECA: 123 cm/sec.   END-DIASTOLIC VELOCITY Common carotid artery : 18 cm/sec Internal carotid:22 cm/sec   The ratio of the peak systolic velocity of the right ICA/CCA is 0.9.   RIGHT VERTEBRAL ARTERY: Antegrade flow   LEFT:     LEFT SIDE PEAK SYSTOLIC VELOCITIES: CCA (distal): 82 cm/sec. There is mild intimal thickening, and tortuosity. ICA: 83 cm/sec. Mild atherosclerotic plaque at the carotid bulb and proximal segment of the internal carotid artery, greater than on the right. ECA: 131 cm/sec.   END-DIASTOLIC VELOCITY Common carotid artery : 10 cm/sec Internal carotid:33 cm/sec   The ratio of the peak systolic velocity of the left ICA/CCA is 1.   LEFT VERTEBRAL ARTERY: Antegrade flow       Mild atherosclerotic plaque at the carotid bulbs, greater on the left, and at the left internal carotid artery proximally with less than 50% diameter stenosis.   The velocity criteria are extrapolated from diameter data as defined by the Society of Radiologists in Ultrasound Consensus Conference Radiology 2003; 229;340-346.   Signed by: Regan Ramirez 2/27/2024 10:51 AM Dictation workstation:   [id number]    XR chest 1 view    Result Date: 2/27/2024  Interpreted By:  Mikey Valencia, STUDY: XR CHEST 1 VIEW;  2/27/2024 7:52 am   INDICATION: Signs/Symptoms:weak.   COMPARISON: 01/04/2023   ACCESSION NUMBER(S): [id number]   ORDERING CLINICIAN: TERRANCE MARINELLI   TECHNIQUE: A portable radiograph of the chest is performed. The study  is limited by lordotic positioning.   FINDINGS: The heart is mildly enlarged. There is minimal subsegmental atelectasis in the right lung base. The lungs and pleural spaces are otherwise clear. There is no pleural effusion or pneumothorax. The pulmonary vessels are within normal limits.       Minimal subsegmental atelectasis in the right lung base.   Mild cardiomegaly.   Otherwise no sign of acute cardiopulmonary disease.   Signed by: Mikey Valencia 2/27/2024 8:01 AM Dictation workstation:   [id number]    CT head wo IV contrast    Result Date: 2/27/2024  Interpreted By:  Mikey Valencia, STUDY: CT HEAD WO IV CONTRAST;  2/27/2024 7:44 am   INDICATION: Signs/Symptoms:weak.   COMPARISON: 11/29/2022   ACCESSION NUMBER(S): [id number]   ORDERING CLINICIAN: TERRANCE MARINELLI   TECHNIQUE: Contiguous unenhanced axial CT sections are performed from the skull base to the vertex.   FINDINGS: The osseous structures are intact. There is mild mucoperiosteal thickening of the ethmoid air cells.   There is mild to moderate generalized parenchymal atrophy with symmetric enlargement of the cortical sulci, more pronounced over the frontal convexities. This appearance is unchanged in the interval.   There is no sign of parenchymal hematoma or dense extra-axial fluid collection. There is no localized edema, mass effect, or shift of the midline. The gray matter/white-matter differentiation is preserved.       No CT evidence of acute intracranial abnormality or interval change from 11/29/2022.   Signed by: Mikey Valencia 2/27/2024 8:00 AM Dictation workstation:   [id number]    ECG 12 lead    Result Date: 2/27/2024  Normal sinus rhythm Normal ECG When compared with ECG of 04-JAN-2023 07:47, Previous ECG has undetermined rhythm, needs review       EEG    Result Date: 2/28/2024  IMPRESSION This routine EEG is normal. No epileptiform discharges or lateralizing signs are seen. However, clinical correlation is strongly  recommended. This report has been interpreted and electronically signed by                  Assessment/Plan     Principal Problem:    Transient monocular blindness, left  Active Problems:    Amaurosis fugax of left eye  Hyperlipidemia  Left vertebral artery stenosis.  Left carotid stenosis.     Plan.  Continue with aspirin.  Patient cannot take statin since patient is having side effects.  Patient was advised to go on low-cholesterol low-fat diet.  Signs symptoms a risk factor of CVA were discussed at great length.  MRI of the head and MRA of the head and neck were discussed.  Ultrasound carotid showed 50% seen on the left and coronary artery and minimal on the right side patient was advised to follow-up with his cardiologist.  Okay to discharge when medically stable and patient can follow-up with us in 6 to 8 weeks with Nikki.  [id number].    Due to technical limitations of voice recognition and human error, this note may not accurately reflect the care of the patient.     Orestes Gupta MD

## 2024-02-28 NOTE — DISCHARGE SUMMARY
Discharge Diagnosis  Transient monocular blindness, left    Issues Requiring Follow-Up   risk factor modification with rechallenge  with low-dose statin given patient's hyperlipidemia.  Follow-up with cardiology.  Follow-up with neurology.  Follow-up with ophthalmology    Discharge Meds     Your medication list        CONTINUE taking these medications        Instructions Last Dose Given Next Dose Due   ACID REDUCER (CIMETIDINE) ORAL           amLODIPine 10 mg tablet  Commonly known as: Norvasc      Take 1 tablet (10 mg) by mouth once daily.       amoxicillin 500 mg capsule  Commonly known as: Amoxil      Take all four capsules 30-60 minutes before dental procedure for a total of 2g.       ASCORBIC ACID (VITAMIN C) ORAL           aspirin 81 mg EC tablet           calcium carbonate  mg (750 mg) chewable tablet  Commonly known as: Tums Extra Strength           carvedilol 25 mg tablet  Commonly known as: Coreg           CHOLECALCIFEROL (VITAMIN D3) ORAL           multivitamin tablet           naproxen 500 mg tablet  Commonly known as: Naprosyn           nitroglycerin 0.4 mg SL tablet  Commonly known as: Nitrostat                    Test Results Pending At Discharge  Pending Labs       No current pending labs.            Hospital Course    63-year-old man with medical history of CAD status post remote NSTEMI with PCI, aortic valve stenosis status post TAVR, hypertension, mixed hyperlipidemia with statin intolerance, COPD and obesity who came in with a 2-week history of intermittent vision loss.  Patient also had persistent blurry vision in the medial lower quadrant of left eye.  patient was found to have elevated lipids but given his statin intolerance statins were not started at this point in time.  Patient will discuss with cardiology to start on low-dose statin as a trial.   Neurology saw and assessed patient.  ESR and CRP were negative.  Carotid ultrasound showed mild atherosclerotic plaque at the carotid bulbs  greater on the left and at the left internal carotid artery proximally with less than 50% diameter stenosis.  Echocardiogram showed EF of 55% with trace mitral valve and tricuspid valve regurgitation.  MRI/MRA head and neck showed no acute infarct but there were scattered foci  the periventricular and subcortical white matter favored to represent sequela of microvascular disease.  There was absent flow enhancement and intracranial left vertebral artery suspected to represent a time indeterminate and possibly chronic occlusion no left posterior inferior cerebellar artery was identified.  There was some mild narrowing of the extracranial left internal carotid artery without evidence of occlusion.   Neurology advised patient to go on a low-cholesterol low-fat diet and discussed risk of CVA as well.  Plan is for patient to follow-up with cardiology as well as neurology in the near future.    > 30 minutes was spent on discharge services.      Pertinent Physical Exam At Time of Discharge  Physical Exam    Constitutional:       Appearance: Normal appearance.   HENT:      Head: Normocephalic.      Mouth/Throat:      Pharynx: Oropharynx is clear.   Eyes:      General: Visual field deficit (medial lower quadrant of left eye) present.      Extraocular Movements: Extraocular movements intact.      Pupils: Pupils are equal, round, and reactive to light.   Cardiovascular:      Rate and Rhythm: Regular rhythm.      Pulses: Normal pulses.      Heart sounds: Normal heart sounds.   Pulmonary:      Effort: Pulmonary effort is normal.      Breath sounds: Normal breath sounds.   Abdominal:      General: Abdomen is flat.      Palpations: Abdomen is soft.   Musculoskeletal:         General: No swelling. Normal range of motion.   Skin:     Capillary Refill: Capillary refill takes less than 2 seconds.   Neurological:      General: No focal deficit present.      Mental Status: He is alert and oriented to person, place, and time.    Psychiatric:         Mood and Affect: Mood normal.         Behavior: Behavior normal.     Outpatient Follow-Up  Future Appointments   Date Time Provider Department Center   8/8/2024  1:00 PM Arnoldo Mota MD [id number] [place]   10/21/2024  1:00 PM JIN REESE ECHO/VASC 3 [id number] [facility]   10/28/2024  2:30 PM Antoine Harrison MD [id number] [place]         Vandana Hare MD

## 2024-02-29 ENCOUNTER — PATIENT OUTREACH (OUTPATIENT)
Dept: CARE COORDINATION | Facility: CLINIC | Age: 64
End: 2024-02-29
Payer: MEDICARE

## 2024-02-29 RX ORDER — ASPIRIN 325 MG
325 TABLET ORAL DAILY
COMMUNITY
End: 2024-03-11 | Stop reason: ALTCHOICE

## 2024-02-29 NOTE — PROGRESS NOTES
Discharge Facility: [facility]  Discharge Diagnosis: Transient monocular blindness, left  Admission Date: 2/27/2024  Discharge Date: 2/28/2024    PCP Appointment Date:  -Unable to schedule d/t no available appts; task sent to office to assist    Hospital Encounter and Summary: Linked    See discharge assessment below for further details    Engagement  Call Start Time: 0916 (2/29/2024  9:20 AM)    Medications  Medications reviewed with patient/caregiver?: Yes (per pt new prescription for aspirin 325mg daily) (2/29/2024  9:20 AM)  Is the patient having any side effects they believe may be caused by any medication additions or changes?: No (2/29/2024  9:20 AM)  Does the patient have all medications ordered at discharge?: Yes (2/29/2024  9:20 AM)  Care Management Interventions: No intervention needed (2/29/2024  9:20 AM)  Is the patient taking all medications as directed (includes completed medication regime)?: Yes (2/29/2024  9:20 AM)    Appointments  Does the patient have a primary care provider?: Yes (2/29/2024  9:20 AM)  Care Management Interventions: Advised patient to make appointment (2/29/2024  9:20 AM)  Has the patient kept scheduled appointments due by today?: Yes (2/29/2024  9:20 AM)    Self Management  Has home health visited the patient within 72 hours of discharge?: Not applicable (2/29/2024  9:20 AM)    Patient Teaching  Does the patient have access to their discharge instructions?: Yes (2/29/2024  9:20 AM)  Care Management Interventions: Reviewed instructions with patient (2/29/2024  9:20 AM)  What is the patient's perception of their health status since discharge?: Improving (2/29/2024  9:20 AM)  Is the patient/caregiver able to teach back the hierarchy of who to call/visit for symptoms/problems? PCP, Specialist, Home Health nurse, Urgent Care, ED, 911: Yes (2/29/2024  9:20 AM)    Wrap Up  Wrap Up Additional Comments: Pt was admitted to [facility] 2/27-2/28/2024 for vision issues in his left  eye. Pt reports he is doing well since discharge. Pt states the only medication change was that his aspirin was increased to 325mg daily. Pt denies questions or issues with medication change. Pt reports he was told that he needed to restart statin medication. Pt states previously he was unable to tolerate and would like to try the lowest dose- will send PCP message. Unable to schedule PCP appt at this time d/t no available appt; task sent to office to assist. Pt denies any other questions, needs, or concerns. He is encouraged to call if questions or needs arise. (2/29/2024  9:20 AM)  Call End Time: 0919 (2/29/2024  9:20 AM)

## 2024-03-02 LAB
ATRIAL RATE: 70 BPM
P AXIS: 67 DEGREES
P OFFSET: 180 MS
P ONSET: 119 MS
PR INTERVAL: 196 MS
Q ONSET: 217 MS
QRS COUNT: 11 BEATS
QRS DURATION: 110 MS
QT INTERVAL: 406 MS
QTC CALCULATION(BAZETT): 438 MS
QTC FREDERICIA: 427 MS
R AXIS: 15 DEGREES
T AXIS: 36 DEGREES
T OFFSET: 420 MS
VENTRICULAR RATE: 70 BPM

## 2024-03-07 DIAGNOSIS — J44.9 CHRONIC OBSTRUCTIVE PULMONARY DISEASE, UNSPECIFIED COPD TYPE (MULTI): Primary | ICD-10-CM

## 2024-03-11 ENCOUNTER — OFFICE VISIT (OUTPATIENT)
Dept: PRIMARY CARE | Facility: CLINIC | Age: 64
End: 2024-03-11
Payer: MEDICARE

## 2024-03-11 VITALS
RESPIRATION RATE: 16 BRPM | DIASTOLIC BLOOD PRESSURE: 84 MMHG | HEIGHT: 68 IN | WEIGHT: 214 LBS | SYSTOLIC BLOOD PRESSURE: 139 MMHG | HEART RATE: 64 BPM | TEMPERATURE: 97.8 F | BODY MASS INDEX: 32.43 KG/M2

## 2024-03-11 DIAGNOSIS — I25.10 CAD S/P PERCUTANEOUS CORONARY ANGIOPLASTY: ICD-10-CM

## 2024-03-11 DIAGNOSIS — I10 HTN (HYPERTENSION), BENIGN: ICD-10-CM

## 2024-03-11 DIAGNOSIS — Z98.61 CAD S/P PERCUTANEOUS CORONARY ANGIOPLASTY: ICD-10-CM

## 2024-03-11 DIAGNOSIS — E78.2 MIXED HYPERLIPIDEMIA: Primary | ICD-10-CM

## 2024-03-11 PROBLEM — N40.0 BPH (BENIGN PROSTATIC HYPERPLASIA): Status: RESOLVED | Noted: 2023-04-26 | Resolved: 2024-03-11

## 2024-03-11 PROBLEM — G45.3 AMAUROSIS FUGAX OF LEFT EYE: Status: RESOLVED | Noted: 2024-02-27 | Resolved: 2024-03-11

## 2024-03-11 PROBLEM — H53.122: Status: RESOLVED | Noted: 2024-02-27 | Resolved: 2024-03-11

## 2024-03-11 PROCEDURE — 1159F MED LIST DOCD IN RCRD: CPT | Performed by: FAMILY MEDICINE

## 2024-03-11 PROCEDURE — 1036F TOBACCO NON-USER: CPT | Performed by: FAMILY MEDICINE

## 2024-03-11 PROCEDURE — 3075F SYST BP GE 130 - 139MM HG: CPT | Performed by: FAMILY MEDICINE

## 2024-03-11 PROCEDURE — 3008F BODY MASS INDEX DOCD: CPT | Performed by: FAMILY MEDICINE

## 2024-03-11 PROCEDURE — 3079F DIAST BP 80-89 MM HG: CPT | Performed by: FAMILY MEDICINE

## 2024-03-11 PROCEDURE — 99495 TRANSJ CARE MGMT MOD F2F 14D: CPT | Performed by: FAMILY MEDICINE

## 2024-03-11 PROCEDURE — 1126F AMNT PAIN NOTED NONE PRSNT: CPT | Performed by: FAMILY MEDICINE

## 2024-03-11 NOTE — PROGRESS NOTES
Subjective   Emiliano Mann is a 63 y.o. male who presents for Hospital Follow-up.  HPI     He was hospitalized at [facility] from 2/27/24 - 2/28/24 for transient monocular blindness. On further questioning it turns out that he has had several episodes of acute sudden severe blurriness of just his left eye with a faulty look.  These episodes have been short and lasting only minutes at a time and then resolving.  He specifically did not see flashing lights or have complete loss of vision or black blindness of the eye.  Was very specific to the left eye and did not involve the right eye at all.  He notes that it tended to come on after he had been looking down preparing food and when he raised his head up the blurriness was present.  There is been no pain    Visit Vitals  /84   Pulse 64   Temp 36.6 °C (97.8 °F)   Resp 16      Objective   Physical Exam  Constitutional:       Appearance: Normal appearance.   HENT:      Head: Normocephalic.   Pulmonary:      Effort: Pulmonary effort is normal.   Musculoskeletal:      Cervical back: Neck supple.   Skin:     General: Skin is warm and dry.   Psychiatric:         Mood and Affect: Mood normal.       Assessment/Plan    This 63-year-old male was treated in the hospital for acute episodes of blurred vision which was really described as fogginess of the left eye only.  There is no involvement of the right eye.  Extensive workup included an MRI and MRA of the brain which did not show any acute infarct but possible small pinpoint ischemic changes in the cerebrum.  Specifically there is nothing in the occipital lobe.  Ultrasound of the carotids was normal.  All blood work was normal.  At this point he clearly did not have a stroke and does not have any signs of impending stroke.  The microvascular disease was not affecting the occipital lobe and therefore could not have caused his symptoms.  His symptoms were clearly left-sided only which also makes cerebral  etiology unlikely.  I suspect he has a significant floater or detritus in the vitreous which is causing the intermittent blurriness of the left eye.  I recommend he go back to his eye doctor for more thorough exam and consideration of this.    Since he clearly is not having cerebral ischemia I recommended that he stop the 325 mg of aspirin and resume the 81 mg aspirin daily for prophylaxis.  He was also recommended to take a statin medication which I endorse as well.  He declines this however as statins have given him muscle aches in the past  Problem List Items Addressed This Visit       CAD S/P percutaneous coronary angioplasty    HTN (hypertension), benign    Mixed hyperlipidemia - Primary

## 2024-03-11 NOTE — PROGRESS NOTES
"Patient: Emiliano Mann  : 1960  PCP: Arnoldo Mota MD  MRN: [id number]  Program: Transitional Care Management  Status: Enrolled  Effective Dates: 2024 - present  Responsible Staff: Mi Kate RN  Social Determinants to be Addressed: Physical Activity, Social Connections, Stress, Tobacco Use         Emiliano Mann is a 63 y.o. male presenting today for follow-up after being discharged from the hospital {Numbers; 1-14:81619} days ago. The main problem requiring admission was ***. The discharge summary and/or Transitional Care Management documentation was reviewed. Medication reconciliation was performed as indicated via the \"Richard as Reviewed\" timestamp.     Emiliano Mann was contacted by Transitional Care Management services two days after his discharge. This encounter and supporting documentation was reviewed.    Review of Systems    /84   Pulse 64   Temp 36.6 °C (97.8 °F)   Resp 16   Ht 1.727 m (5' 8\")   Wt 97.1 kg (214 lb)   BMI 32.54 kg/m²     Physical Exam    The complexity of medical decision making for this patient's transitional care is {DESC; MODERATE/HIGH:39350}.    Assessment/Plan   {Assess/PlanSmartLinks:64963}    "

## 2024-03-12 ENCOUNTER — TELEMEDICINE (OUTPATIENT)
Dept: PHARMACY | Facility: HOSPITAL | Age: 64
End: 2024-03-12
Payer: MEDICARE

## 2024-03-12 DIAGNOSIS — J44.9 CHRONIC OBSTRUCTIVE PULMONARY DISEASE, UNSPECIFIED COPD TYPE (MULTI): ICD-10-CM

## 2024-03-12 RX ORDER — OMEPRAZOLE 20 MG/1
20 TABLET, DELAYED RELEASE ORAL DAILY PRN
COMMUNITY

## 2024-03-12 RX ORDER — NAPROXEN SODIUM 220 MG/1
1 TABLET ORAL DAILY
COMMUNITY

## 2024-03-12 RX ORDER — FAMOTIDINE 20 MG/1
20 TABLET, FILM COATED ORAL DAILY PRN
COMMUNITY

## 2024-03-12 RX ORDER — MULTIVIT WITH MINERALS/HERBS
1 TABLET ORAL DAILY
COMMUNITY

## 2024-03-12 NOTE — PROGRESS NOTES
Pharmacy Post-Discharge Visit  Emiliano Mann is a 63 y.o. male was referred to Clinical Pharmacy Team to complete a post-discharge medication optimization and monitoring visit.  The patient was referred for their COPD.    Admission Date: 2/27/24  Discharge Date: 2/28/24    Referring Provider: Arnoldo Mota MD    Admitted for acute episodes of blurred vision which was addressed by PCP but recommended for follow-up with eye doctor. Patient will make appointment with eye doctor today. He reports increased exercise and monitoring of diet, so he's been having less stomach issues.     Notable Medication changes following discharge:  Start: aspirin 81mg three times per week  Stop: aspirin 325mg daily    Subjective   Allergies   Allergen Reactions    Clopidogrel Bleeding     Patient is unsure if he used it in past    Ezetimibe Myalgia    Lisinopril Cough    Losartan Potassium Dizziness     Patient had vertigo    Morphine Nausea/vomiting    Statins-Hmg-Coa Reductase Inhibitors Myalgia     Leg, thighs cramps  Hx of pravastatin, simvastatin       [facility] - [place], [place] - [address] [facility]  [address] [facility] [address]  Phone: [phone] Fax: [phone]    [facility] - [place], [place] - [address]  [address]  [facility] [address]  [facility] [address]  Phone: [phone] Fax: [phone]      Social History     Social History Narrative    Not on file        HPI  COPD ASSESSMENT  Mild centrilobular emphysema - didn't feel that his breathing was improved by inhalers for its price. No hx of asthma.  Currently not on any inhaler. He is willing to try it within reason  Former smoker (quit >20 years)    Secondary Prevention (vaccines):  Immunization History   Administered Date(s) Administered    Influenza, Seasonal, Quadrivalent, Adjuvanted 09/18/2023    Influenza, Unspecified 09/20/2011, 12/01/2012, 11/01/2013, 11/01/2015, 10/01/2016, 10/26/2016, 11/08/2017, 10/17/2018, 10/03/2020,  "09/21/2021    Influenza, injectable, quadrivalent 11/02/2019    Influenza, seasonal, injectable, preservative free 10/22/2014    Pfizer Purple Cap SARS-CoV-2 09/21/2021, 10/12/2021    Pneumococcal polysaccharide vaccine, 23-valent, age 2 years and older (PNEUMOVAX 23) 10/01/2013, 10/25/2016    Tdap vaccine, age 7 year and older (BOOSTRIX, ADACEL) 10/19/2015    Zoster vaccine, recombinant, adult (SHINGRIX) 09/25/2020, 10/12/2020, 01/15/2021       Sx Management: triggered only on very humid summer days and very cold winter days      Exacerbation Hx:  -When was your last hospitalization for an exacerbation? \"A long time ago\"  -When was the last time you were treated with antibiotics and/or steroids? \"A long time ago\"    Review of Systems    Medication System Management:  Affordability/Accessibility: cost of inhalers    Objective     There were no vitals taken for this visit.     LAB  Lab Results   Component Value Date    BILITOT 0.5 02/27/2024    CALCIUM 8.7 02/27/2024    CO2 23 02/27/2024     02/27/2024    CREATININE 1.02 02/27/2024    GLUCOSE 154 (H) 02/27/2024    ALKPHOS 47 02/27/2024    K 3.7 02/27/2024    PROT 6.2 (L) 02/27/2024     02/27/2024    AST 18 02/27/2024    ALT 22 02/27/2024    BUN 17 02/27/2024    ANIONGAP 13 02/27/2024    MG 1.95 10/11/2022    PHOS 3.0 02/03/2023    ALBUMIN 4.0 02/27/2024    GFRMALE 88 02/03/2023     Lab Results   Component Value Date    TRIG 298 (H) 02/27/2024    CHOL 245 (H) 02/27/2024    LDLCALC 139 (H) 02/27/2024    HDL 46.6 02/27/2024     No results found for: \"HGBA1C\"    Completed comprehensive medication review with the patient.   Current Outpatient Medications on File Prior to Visit   Medication Sig Dispense Refill    amLODIPine (Norvasc) 10 mg tablet Take 1 tablet (10 mg) by mouth once daily. 90 tablet 3    amoxicillin (Amoxil) 500 mg capsule Take all four capsules 30-60 minutes before dental procedure for a total of 2g. 4 capsule 0    ASCORBIC ACID, VITAMIN C, " ORAL Take 282 mg by mouth once daily.      aspirin 81 mg EC tablet Take 1 tablet (81 mg) by mouth 3 times a week.      calcium carbonate EX (Tums Extra Strength) 300 mg (750 mg) chewable tablet Chew 600 mg as needed at bedtime for indigestion or heartburn.      carvedilol (Coreg) 25 mg tablet Take 1 tablet (25 mg) by mouth 2 times a day with meals.      cholecalciferol (Vitamin D-3) 125 MCG (5000 UT) capsule Take 1 capsule (125 mcg) by mouth once daily.      famotidine (Pepcid) 20 mg tablet Take 1 tablet (20 mg) by mouth once daily as needed for heartburn.      multivitamin tablet Take 1 tablet by mouth 3 times a week.      nitroglycerin (Nitrostat) 0.4 mg SL tablet Place 1 tablet (0.4 mg) under the tongue every 5 minutes if needed for chest pain. DISSOLVE 1 TABLET UNDER THE TONGUE AS NEEDED FOR CHEST PAIN- MAY REPEAT EVERY 5 MINUTES IF NEEDED (MAX 3 DOSES.- IF NO RELIEF CALL 911)      omega 3-dha-epa-fish oil (Fish OiL) 1,200 (144-216) mg capsule Take 1 capsule (1,200 mg) by mouth once daily.      omeprazole OTC (PriLOSEC OTC) 20 mg EC tablet Take 1 tablet (20 mg) by mouth once daily as needed (acid reflux). Do not crush, chew, or split.      vitamin B complex tablet Take 1 tablet by mouth once daily.      [DISCONTINUED] fish,bora,flax oils-om3,6,9no1 1,200 mg capsule Take 1 capsule by mouth once daily.      [DISCONTINUED] ACID REDUCER, CIMETIDINE, ORAL Take 1 tablet by mouth once daily as needed.      [DISCONTINUED] aspirin 325 mg tablet Take 1 tablet (325 mg) by mouth once daily.      [DISCONTINUED] naproxen (Naprosyn) 500 mg tablet Take 1 tablet (500 mg) by mouth every 12 hours if needed for mild pain (1 - 3).       No current facility-administered medications on file prior to visit.      Medication Reconciliation  Added:  Fish oil 1200 mg daily  Vitamin B complex daily  Famotidine (Zantac 360) 20mg prn  Omeprazole 20 mg daily prn  Removed:   Cimetidine (not taking)  Naproxen (not taking, ineffective)  Patient  asked about recommendation for anti-inflammatory agent. Discussed Tylenol q4-5hrs prn instead of naproxen and NSAIDs due to hx of bleeding. Max daily dose of 4000mg  Patient reports:  Vitamin C 282 mg daily  Vitamin D3 5000 units daily    Historical pharmacotherapy/notes:  Statin: encouraged by inpatient providers and PCP to restart statin for mixed hyperlipidemia. Patient declines as statins have given him muscles aches in the past. He is unsure what he has tried in past. He will discuss with cardiologist about restarting again possibly a low intensity if needed.   Amoxicillin: Dentist prescribed amoxicillin 500mg q6h for infection 1 week. He also typically takes amoxicillin 2000mg 30-60 min before a dental procedure. Currently finished all antibiotics and has another dental appointment coming up. May need refill for prophylaxis dose. Discussed with patient to request refill from PCP or dentist  Takes medications for acid reflux/heartburn prn. Since watching his diet, less stomach issues   Omeprazole taken more regularly, but it has decreased to every other day prn  Zantac 360 typically once a week prn depending on what patient will eat  Tums typically at bedtime prn depending on what he ate      Assessment/Plan   Problem List Items Addressed This Visit       Chronic obstructive pulmonary disease (CMS/HCC)     Patient would benefit from addition of rescue inhaler and maintenance bronchodilator based on his symptoms and lack of exacerbations over the past year. He currently is not using any inhaler for his centrilobular emphysema due to cost of the medications. He is willing to comply with respiratory therapy if they are covered.   Determine covered bronchodilator and rescue inhaler and initiate at follow-up          Continue all meds under the continuation of care with the referring provider and clinical pharmacy team.    Nolvia Briggs     Verbal consent to manage patient's drug therapy was obtained from the  patient. They were informed they may decline to participate or withdraw from participation in pharmacy services at any time.

## 2024-03-12 NOTE — ASSESSMENT & PLAN NOTE
Patient would benefit from addition of rescue inhaler and maintenance bronchodilator based on his symptoms and lack of exacerbations over the past year. He currently is not using any inhaler for his centrilobular emphysema due to cost of the medications. He is willing to comply with respiratory therapy if they are covered.   Determine covered bronchodilator and rescue inhaler and initiate at follow-up

## 2024-03-13 NOTE — PROGRESS NOTES
I reviewed the progress note and agree with the resident’s findings and plans as written. Case discussed with resident.    Rhonda Doyle, SpencerD

## 2024-03-14 ENCOUNTER — PATIENT OUTREACH (OUTPATIENT)
Dept: CARE COORDINATION | Facility: CLINIC | Age: 64
End: 2024-03-14
Payer: MEDICARE

## 2024-03-14 NOTE — PROGRESS NOTES
Attempted PCP follow up call after appt on 3/11/2024.  Pt reports he just got home from the dentist and requests call back tomorrow.

## 2024-03-15 ENCOUNTER — PATIENT OUTREACH (OUTPATIENT)
Dept: CARE COORDINATION | Facility: CLINIC | Age: 64
End: 2024-03-15
Payer: MEDICARE

## 2024-03-21 ENCOUNTER — TELEMEDICINE (OUTPATIENT)
Dept: PHARMACY | Facility: HOSPITAL | Age: 64
End: 2024-03-21
Payer: MEDICARE

## 2024-03-21 DIAGNOSIS — J44.9 CHRONIC OBSTRUCTIVE PULMONARY DISEASE, UNSPECIFIED COPD TYPE (MULTI): Primary | ICD-10-CM

## 2024-03-21 NOTE — ASSESSMENT & PLAN NOTE
Patient would benefit from addition of rescue and maintenance respiratory therapy based on his her symptoms. He currently is not using any inhaler for his centrilobular emphysema due to cost of the medications. He is willing to comply with respiratory therapy if they are affordable.  Based on his symptoms and no exacerbations over the past year, he may benefit from addition of a maintenance LAMA for his daily morning sputum production and a rescue inhaler for SOB based on the weather.  Patient must meet deductible before cost decreases. He may qualify for  PAP/VAF with  Specialty pharmacy. [facility] is not within his network. Emailed  PAP/VAF information to patient for review and to provide proof of income  Patient is interested in losing weight. Emailed weight loss pharmacotherapeutic information at the patient's request.

## 2024-03-21 NOTE — PROGRESS NOTES
Pharmacy Post-Discharge Visit  Emiliano Mann is a 63 y.o. male was referred to Clinical Pharmacy Team to complete a post-discharge medication optimization and monitoring visit.  The patient was referred for their COPD.    Admission Date: 2/27/24  Discharge Date: 2/28/24    Referring Provider: Arnoldo Mota MD    Patient has history of COPD but is not on any inhalers. He is interested but he had stopped previously due to cost and didn't think the cost outweighed the benefit. His COPD is typically more triggered with the weather conditions, so he is likely to stay in more during those times. Reports phlegm every morning, sinus feels like they're collapsing every night when he lays down, some breathlessness going uphill, little energy.   Planning to go on a cruise in the first week of April and is trying to lose weight.    Notable Medication changes following discharge:  Start: aspirin 81mg three times per week  Stop: aspirin 325mg daily    Subjective   Allergies   Allergen Reactions    Clopidogrel Bleeding     Patient is unsure if he used it in past    Ezetimibe Myalgia    Lisinopril Cough    Losartan Potassium Dizziness     Patient had vertigo    Morphine Nausea/vomiting    Statins-Hmg-Coa Reductase Inhibitors Myalgia     Leg, thighs cramps  Hx of pravastatin, simvastatin       [facility] - [place], [place] - [address] [facility]  [address] [facility] [address]  Phone: [phone] Fax: [phone]    [facility] - [place], [place] - [address]  [address]  [facility] [address]  [facility] [address]  Phone: [phone] Fax: [phone]      Social History     Social History Narrative    Not on file        HPI  COPD ASSESSMENT  Mild centrilobular emphysema - didn't feel that his breathing was improved by inhalers for its price. No hx of asthma.  Currently not on any inhaler. He is willing to try it if cost is within reason  Former smoker (quit >20 years)    Secondary Prevention  "(vaccines):  Immunization History   Administered Date(s) Administered    Influenza, Seasonal, Quadrivalent, Adjuvanted 09/18/2023    Influenza, Unspecified 09/20/2011, 12/01/2012, 11/01/2013, 11/01/2015, 10/01/2016, 10/26/2016, 11/08/2017, 10/17/2018, 10/03/2020, 09/21/2021    Influenza, injectable, quadrivalent 11/02/2019    Influenza, seasonal, injectable, preservative free 10/22/2014    Pfizer Purple Cap SARS-CoV-2 09/21/2021, 10/12/2021    Pneumococcal polysaccharide vaccine, 23-valent, age 2 years and older (PNEUMOVAX 23) 10/01/2013, 10/25/2016    Tdap vaccine, age 7 year and older (BOOSTRIX, ADACEL) 10/19/2015    Zoster vaccine, recombinant, adult (SHINGRIX) 09/25/2020, 10/12/2020, 01/15/2021     Current state without triggers, his estimated breathing is scored as below:      Sx Management: triggered only on very humid summer days and very cold winter days scoreed      Exacerbation Hx:  -When was your last hospitalization for an exacerbation? \"A long time ago\"  -When was the last time you were treated with antibiotics and/or steroids? \"A long time ago\"    Review of Systems    Affordability/Accessibility:   Inhalers were too costly in the past (currently: albuterol is ~$45 and Incruse ~$291.34. Formulary prefers Incruse for LAMA)  Deductible is $275. Inhalers are not well covered until deductible is met.     Objective     There were no vitals taken for this visit.     LAB  Lab Results   Component Value Date    BILITOT 0.5 02/27/2024    CALCIUM 8.7 02/27/2024    CO2 23 02/27/2024     02/27/2024    CREATININE 1.02 02/27/2024    GLUCOSE 154 (H) 02/27/2024    ALKPHOS 47 02/27/2024    K 3.7 02/27/2024    PROT 6.2 (L) 02/27/2024     02/27/2024    AST 18 02/27/2024    ALT 22 02/27/2024    BUN 17 02/27/2024    ANIONGAP 13 02/27/2024    MG 1.95 10/11/2022    PHOS 3.0 02/03/2023    ALBUMIN 4.0 02/27/2024    GFRMALE 88 02/03/2023     Lab Results   Component Value Date    TRIG 298 (H) 02/27/2024    CHOL 245 (H) " "02/27/2024    LDLCALC 139 (H) 02/27/2024    HDL 46.6 02/27/2024     No results found for: \"HGBA1C\"    Completed comprehensive medication review with the patient.   Current Outpatient Medications on File Prior to Visit   Medication Sig Dispense Refill    amLODIPine (Norvasc) 10 mg tablet Take 1 tablet (10 mg) by mouth once daily. 90 tablet 3    amoxicillin (Amoxil) 500 mg capsule Take all four capsules 30-60 minutes before dental procedure for a total of 2g. 4 capsule 0    ASCORBIC ACID, VITAMIN C, ORAL Take 282 mg by mouth once daily.      aspirin 81 mg EC tablet Take 1 tablet (81 mg) by mouth 3 times a week.      calcium carbonate EX (Tums Extra Strength) 300 mg (750 mg) chewable tablet Chew 600 mg as needed at bedtime for indigestion or heartburn.      carvedilol (Coreg) 25 mg tablet Take 1 tablet (25 mg) by mouth 2 times a day with meals.      cholecalciferol (Vitamin D-3) 125 MCG (5000 UT) capsule Take 1 capsule (125 mcg) by mouth once daily.      famotidine (Pepcid) 20 mg tablet Take 1 tablet (20 mg) by mouth once daily as needed for heartburn.      multivitamin tablet Take 1 tablet by mouth 3 times a week.      nitroglycerin (Nitrostat) 0.4 mg SL tablet Place 1 tablet (0.4 mg) under the tongue every 5 minutes if needed for chest pain. DISSOLVE 1 TABLET UNDER THE TONGUE AS NEEDED FOR CHEST PAIN- MAY REPEAT EVERY 5 MINUTES IF NEEDED (MAX 3 DOSES.- IF NO RELIEF CALL 911)      omega 3-dha-epa-fish oil (Fish OiL) 1,200 (144-216) mg capsule Take 1 capsule (1,200 mg) by mouth once daily.      omeprazole OTC (PriLOSEC OTC) 20 mg EC tablet Take 1 tablet (20 mg) by mouth once daily as needed (acid reflux). Do not crush, chew, or split.      vitamin B complex tablet Take 1 tablet by mouth once daily.       No current facility-administered medications on file prior to visit.      Medication Reconciliation  Added:  Fish oil 1200 mg daily  Vitamin B complex daily  Famotidine (Zantac 360) 20mg prn  Omeprazole 20 mg daily " prn  Removed:   Cimetidine (not taking)  Naproxen (not taking, ineffective)  Patient asked about recommendation for anti-inflammatory agent. Discussed Tylenol q4-5hrs prn instead of naproxen and NSAIDs due to hx of bleeding. Max daily dose of 4000mg  Patient reports:  Vitamin C 282 mg daily  Vitamin D3 5000 units daily    Historical pharmacotherapy/notes:  Statin: encouraged by inpatient providers and PCP to restart statin for mixed hyperlipidemia. Patient declines as statins have given him muscles aches in the past. He is unsure what he has tried in past. He will discuss with cardiologist about restarting again possibly a low intensity if needed.   Amoxicillin: Dentist prescribed amoxicillin 500mg q6h for infection 1 week. He also typically takes amoxicillin 2000mg 30-60 min before a dental procedure. Currently finished all antibiotics and has another dental appointment coming up. May need refill for prophylaxis dose. Discussed with patient to request refill from PCP or dentist  Takes medications for acid reflux/heartburn prn. Since watching his diet, less stomach issues   Omeprazole taken more regularly, but it has decreased to every other day prn  Zantac 360 typically once a week prn depending on what patient will eat  Tums typically at bedtime prn depending on what he ate    Assessment/Plan   Problem List Items Addressed This Visit       Chronic obstructive pulmonary disease (CMS/HCC) - Primary     Patient would benefit from addition of rescue and maintenance respiratory therapy based on his her symptoms. He currently is not using any inhaler for his centrilobular emphysema due to cost of the medications. He is willing to comply with respiratory therapy if they are affordable.  Based on his symptoms and no exacerbations over the past year, he may benefit from addition of a maintenance LAMA for his daily morning sputum production and a rescue inhaler for SOB based on the weather.  Patient must meet deductible  before cost decreases. He may qualify for  PAP/VAF with  Specialty pharmacy. [facility] is not within his network. Emailed  PAP/VAF information to patient for review and to provide proof of income  Patient is interested in losing weight. Emailed weight loss pharmacotherapeutic information at the patient's request.           Follow up: 1 week    Continue all meds under the continuation of care with the referring provider and clinical pharmacy team.    Nolvia Briggs, Shanti     Verbal consent to manage patient's drug therapy was obtained from the patient. They were informed they may decline to participate or withdraw from participation in pharmacy services at any time.

## 2024-03-24 NOTE — PROGRESS NOTES
I reviewed the progress note and agree with the resident’s findings and plans as written. Case discussed with resident.    Kandis Jacobsen, PharmD

## 2024-03-27 ENCOUNTER — TELEPHONE (OUTPATIENT)
Dept: CARDIOLOGY | Facility: HOSPITAL | Age: 64
End: 2024-03-27
Payer: MEDICARE

## 2024-03-28 ENCOUNTER — TELEMEDICINE (OUTPATIENT)
Dept: PHARMACY | Facility: HOSPITAL | Age: 64
End: 2024-03-28
Payer: MEDICARE

## 2024-03-28 DIAGNOSIS — J44.9 CHRONIC OBSTRUCTIVE PULMONARY DISEASE, UNSPECIFIED COPD TYPE (MULTI): Primary | ICD-10-CM

## 2024-03-28 NOTE — PROGRESS NOTES
Pharmacy Post-Discharge Visit  Emiliano Mann is a 63 y.o. male was referred to Clinical Pharmacy Team to complete a post-discharge medication optimization and monitoring visit.  The patient was referred for their No chief complaint on file..    Admission Date: 2/27/24  Discharge Date: 2/28/24    Referring Provider: Arnoldo Mota MD    Patient has history of COPD but is not on any inhalers. He is interested but he had stopped previously due to cost and didn't think the cost outweighed the benefit. His COPD is typically more triggered with the weather conditions, so he is likely to stay in more during those times. Reports phlegm every morning, sinus feels like they're collapsing every night when he lays down, some breathlessness going uphill, little energy.   Planning to go on a cruise on April 13th and has taken the measures in diet and exercise to lose weight.    Social History     Social History Narrative    Not on file        HPI  COPD ASSESSMENT  Mild centrilobular emphysema - didn't feel that his breathing was improved by inhalers for its price. No hx of asthma.  Currently not on any inhaler. He is willing to try it if cost is within reason  Former smoker (quit >20 years)    Secondary Prevention (vaccines):  Immunization History   Administered Date(s) Administered    Influenza, Seasonal, Quadrivalent, Adjuvanted 09/18/2023    Influenza, Unspecified 09/20/2011, 12/01/2012, 11/01/2013, 11/01/2015, 10/01/2016, 10/26/2016, 11/08/2017, 10/17/2018, 10/03/2020, 09/21/2021    Influenza, injectable, quadrivalent 11/02/2019    Influenza, seasonal, injectable, preservative free 10/22/2014    Pfizer Purple Cap SARS-CoV-2 09/21/2021, 10/12/2021    Pneumococcal polysaccharide vaccine, 23-valent, age 2 years and older (PNEUMOVAX 23) 10/01/2013, 10/25/2016    Tdap vaccine, age 7 year and older (BOOSTRIX, ADACEL) 10/19/2015    Zoster vaccine, recombinant, adult (SHINGRIX) 09/25/2020, 10/12/2020, 01/15/2021     Current state  "without triggers, his estimated breathing is scored as below:      Sx Management: triggered only on very humid summer days and very cold winter days scoreed      Exacerbation Hx:  -When was your last hospitalization for an exacerbation? \"A long time ago\"  -When was the last time you were treated with antibiotics and/or steroids? \"A long time ago\"    Review of Systems    Affordability/Accessibility:   Inhalers were too costly in the past (currently: albuterol is ~$45 and Incruse ~$291.34. Formulary prefers Incruse for LAMA. Once daily dosing would be ideal for the patient)  Deductible is $275. Inhalers are not well covered until deductible is met.     Objective     There were no vitals taken for this visit.     LAB  Lab Results   Component Value Date    BILITOT 0.5 02/27/2024    CALCIUM 8.7 02/27/2024    CO2 23 02/27/2024     02/27/2024    CREATININE 1.02 02/27/2024    GLUCOSE 154 (H) 02/27/2024    ALKPHOS 47 02/27/2024    K 3.7 02/27/2024    PROT 6.2 (L) 02/27/2024     02/27/2024    AST 18 02/27/2024    ALT 22 02/27/2024    BUN 17 02/27/2024    ANIONGAP 13 02/27/2024    MG 1.95 10/11/2022    PHOS 3.0 02/03/2023    ALBUMIN 4.0 02/27/2024    GFRMALE 88 02/03/2023     Lab Results   Component Value Date    TRIG 298 (H) 02/27/2024    CHOL 245 (H) 02/27/2024    LDLCALC 139 (H) 02/27/2024    HDL 46.6 02/27/2024     No results found for: \"HGBA1C\"    Historical pharmacotherapy/notes:  Statin: encouraged by inpatient providers and PCP to restart statin for mixed hyperlipidemia. Patient declines as statins have given him muscles aches in the past. He is unsure what he has tried in past. He will discuss with cardiologist about restarting again possibly a low intensity if needed.   Amoxicillin: Dentist prescribed amoxicillin 500mg q6h for infection 1 week. He also typically takes amoxicillin 2000mg 30-60 min before a dental procedure. Currently finished all antibiotics and has another dental appointment coming up. " May need refill for prophylaxis dose. Discussed with patient to request refill from PCP or dentist  Takes medications for acid reflux/heartburn prn. Since watching his diet, less stomach issues   Omeprazole taken more regularly, but it has decreased to every other day prn  Zantac 360 typically once a week prn depending on what patient will eat  Tums typically at bedtime prn depending on what he ate    Assessment/Plan   Problem List Items Addressed This Visit       Chronic obstructive pulmonary disease (CMS/HCC) - Primary     Patient would benefit from addition of rescue and maintenance respiratory therapy based on his her symptoms. Currently not using any inhaler for his centrilobular emphysema due to cost of the medications. He is willing to comply with respiratory therapy if they are affordable, but at the moment he is not interested. Generally wants to be on the least amount of meds, so he would like to hold off on initiating inhalers until closer to May when weather changes. Has made lifestyle modifications for weight loss and healthier living.  Based on his symptoms and no exacerbations over the past year, he may benefit from addition of a maintenance LAMA (Incruse once daily) for his daily symptoms and a rescue inhaler for SOB based on the weather.  Patient must meet deductible before cost decreases. It can be met with  PAP/VAF which will lower his copay for albuterol. He may qualify for  PAP/VAF with  Specialty pharmacy. [facility] is not within his network.   Follow up: 5/2/24             Continue all meds under the continuation of care with the referring provider and clinical pharmacy team.    Nolvia Briggs, PharmD     Verbal consent to manage patient's drug therapy was obtained from the patient. They were informed they may decline to participate or withdraw from participation in pharmacy services at any time.

## 2024-03-28 NOTE — ASSESSMENT & PLAN NOTE
Patient would benefit from addition of rescue and maintenance respiratory therapy based on his her symptoms. Currently not using any inhaler for his centrilobular emphysema due to cost of the medications. He is willing to comply with respiratory therapy if they are affordable, but at the moment he is not interested. Generally wants to be on the least amount of meds, so he would like to hold off on initiating inhalers until closer to May when weather changes. Has made lifestyle modifications for weight loss and healthier living.  Based on his symptoms and no exacerbations over the past year, he may benefit from addition of a maintenance LAMA (Incruse once daily) for his daily symptoms and a rescue inhaler for SOB based on the weather.  Patient must meet deductible before cost decreases. It can be met with  PAP/VAF which will lower his copay for albuterol. He may qualify for  PAP/VAF with  Specialty pharmacy. [facility] is not within his network.   Follow up: 5/2/24

## 2024-03-29 DIAGNOSIS — Z95.2 S/P TAVR (TRANSCATHETER AORTIC VALVE REPLACEMENT): ICD-10-CM

## 2024-03-29 RX ORDER — AMOXICILLIN 500 MG/1
2000 CAPSULE ORAL ONCE
Qty: 4 CAPSULE | Refills: 3 | Status: SHIPPED | OUTPATIENT
Start: 2024-03-29 | End: 2024-03-29

## 2024-04-11 DIAGNOSIS — I10 HTN (HYPERTENSION), BENIGN: ICD-10-CM

## 2024-04-12 RX ORDER — CARVEDILOL 25 MG/1
25 TABLET ORAL 2 TIMES DAILY
Qty: 180 TABLET | Refills: 3 | Status: SHIPPED | OUTPATIENT
Start: 2024-04-12

## 2024-04-17 ENCOUNTER — PATIENT OUTREACH (OUTPATIENT)
Dept: CARE COORDINATION | Facility: CLINIC | Age: 64
End: 2024-04-17
Payer: MEDICARE

## 2024-04-17 NOTE — PROGRESS NOTES
Unable to reach patient for discharge follow up call.   LVM with call back number for patient to call if needed   If no voicemail available call attempts x 2 were made to contact the patient to assist with any questions or concerns patient may have.

## 2024-05-02 ENCOUNTER — APPOINTMENT (OUTPATIENT)
Dept: PHARMACY | Facility: HOSPITAL | Age: 64
End: 2024-05-02
Payer: MEDICARE

## 2024-05-06 ENCOUNTER — APPOINTMENT (OUTPATIENT)
Dept: PHARMACY | Facility: HOSPITAL | Age: 64
End: 2024-05-06
Payer: COMMERCIAL

## 2024-05-15 ENCOUNTER — APPOINTMENT (OUTPATIENT)
Dept: PHARMACY | Facility: HOSPITAL | Age: 64
End: 2024-05-15
Payer: COMMERCIAL

## 2024-05-20 ENCOUNTER — PATIENT OUTREACH (OUTPATIENT)
Dept: CARE COORDINATION | Facility: CLINIC | Age: 64
End: 2024-05-20
Payer: COMMERCIAL

## 2024-06-05 ENCOUNTER — HOSPITAL ENCOUNTER (OUTPATIENT)
Dept: RADIOLOGY | Facility: CLINIC | Age: 64
Discharge: HOME | End: 2024-06-05
Payer: MEDICARE

## 2024-06-05 ENCOUNTER — OFFICE VISIT (OUTPATIENT)
Dept: ORTHOPEDIC SURGERY | Facility: CLINIC | Age: 64
End: 2024-06-05
Payer: MEDICARE

## 2024-06-05 DIAGNOSIS — M75.82 ROTATOR CUFF TENDINITIS, LEFT: Primary | ICD-10-CM

## 2024-06-05 DIAGNOSIS — M25.512 ACUTE PAIN OF LEFT SHOULDER: ICD-10-CM

## 2024-06-05 PROCEDURE — 73030 X-RAY EXAM OF SHOULDER: CPT | Mod: LT

## 2024-06-05 PROCEDURE — 3008F BODY MASS INDEX DOCD: CPT | Performed by: INTERNAL MEDICINE

## 2024-06-05 PROCEDURE — 73030 X-RAY EXAM OF SHOULDER: CPT | Mod: LEFT SIDE | Performed by: INTERNAL MEDICINE

## 2024-06-05 PROCEDURE — 99213 OFFICE O/P EST LOW 20 MIN: CPT | Performed by: INTERNAL MEDICINE

## 2024-06-05 RX ORDER — PREDNISONE 50 MG/1
50 TABLET ORAL DAILY
Qty: 5 TABLET | Refills: 0 | Status: SHIPPED | OUTPATIENT
Start: 2024-06-05 | End: 2024-06-10

## 2024-06-05 NOTE — PROGRESS NOTES
Acute Injury New Patient Visit    CC:   Chief Complaint   Patient presents with    Left Shoulder - Pain     Lt shoulder pain   Xrays today       HPI: Emiliano is a 64 y.o. male presents today for evaluation for left shoulder pain which gradually started about three weeks ago. He does note that he fell about nine months ago but had no issues until three weeks ago. He also notes that he recently did some yard work, afterwards started noticing pain in the left shoulder. He is here for initial evaluation and x-rays. No fall or traumatic injury      Review of Systems   GENERAL: Negative for malaise, significant weight loss, fever  MUSCULOSKELETAL: See HPI  NEURO:  Negative for numbness / tingling     Past Medical History  Past Medical History:   Diagnosis Date    Personal history of other diseases of the circulatory system 10/20/2022    History of aortic valve stenosis       Medication review  Medication Documentation Review Audit       Reviewed by Nolvia Briggs (Pharmacist) on 03/12/24 at 1209      Medication Order Taking? Sig Documenting Provider Last Dose Status     Discontinued 03/12/24 1156   amLODIPine (Norvasc) 10 mg tablet [id number] Yes Take 1 tablet (10 mg) by mouth once daily. Arnoldo Mota MD Taking Active   amoxicillin (Amoxil) 500 mg capsule [id number] Yes Take all four capsules 30-60 minutes before dental procedure for a total of 2g.   Patient taking differently: Take 1 capsule (500 mg) by mouth every 6 hours.    KVNG Mckeon-CNP Taking Differently Active   ASCORBIC ACID, VITAMIN C, ORAL [id number] Yes Take 1 tablet by mouth 3 times a week. Historical Provider, MD Taking Active     Discontinued 03/11/24 1551   aspirin 81 mg EC tablet [id number] Yes Take 1 tablet (81 mg) by mouth 3 times a week. Historical Provider, MD Taking Active   calcium carbonate EX (Tums Extra Strength) 300 mg (750 mg) chewable tablet [id number] Yes Chew 600 mg as needed at bedtime for indigestion or heartburn. Historical  Provider, MD Taking Active   carvedilol (Coreg) 25 mg tablet [id number] Yes Take 1 tablet (25 mg) by mouth 2 times a day with meals. Historical Provider, MD Taking Active   CHOLECALCIFEROL, VITAMIN D3, ORAL [id number] Yes Take 50,000 Units by mouth once daily. Historical MD Vivek Taking Active   famotidine (Pepcid) 20 mg tablet [id number] Yes Take 1 tablet (20 mg) by mouth once daily as needed for heartburn. Historical MD Vivek Taking Active   fish,bora,flax oils-om3,6,9no1 (Omega 3-6-9) 1,200 mg capsule [id number] Yes Take 1 capsule by mouth once daily. Historical Provider, MD Taking Active   multivitamin tablet [id number] Yes Take 1 tablet by mouth 3 times a week. Anderson Doyle MD Taking Active   Discontinued 03/12/24 1208   nitroglycerin (Nitrostat) 0.4 mg SL tablet [id number] Yes Place 1 tablet (0.4 mg) under the tongue every 5 minutes if needed for chest pain. DISSOLVE 1 TABLET UNDER THE TONGUE AS NEEDED FOR CHEST PAIN- MAY REPEAT EVERY 5 MINUTES IF NEEDED (MAX 3 DOSES.- IF NO RELIEF CALL 911) Anderson Doyle MD Taking Active   omeprazole OTC (PriLOSEC OTC) 20 mg EC tablet [id number] Yes Take 1 tablet (20 mg) by mouth once daily as needed (acid reflux). Do not crush, chew, or split. Historical MD Vivek Taking Active   vitamin B complex tablet [id number] Yes Take 1 tablet by mouth once daily. Historical Provider, MD Taking Active                    Allergies  Allergies   Allergen Reactions    Clopidogrel Bleeding     Patient is unsure if he used it in past    Ezetimibe Myalgia    Lisinopril Cough    Losartan Potassium Dizziness     Patient had vertigo    Morphine Nausea/vomiting    Statins-Hmg-Coa Reductase Inhibitors Myalgia     Leg, thighs cramps  Hx of pravastatin, simvastatin       Social History  Social History     Socioeconomic History    Marital status:      Spouse name: Not on file    Number of children: Not on file    Years of education: Not on file    Highest education level: Not  on file   Occupational History    Not on file   Tobacco Use    Smoking status: Former     Current packs/day: 0.00     Types: Cigarettes     Quit date: 2000     Years since quittin.4    Smokeless tobacco: Never   Substance and Sexual Activity    Alcohol use: Yes     Alcohol/week: 1.0 standard drink of alcohol     Types: 1 Shots of liquor per week    Drug use: Never    Sexual activity: Not on file   Other Topics Concern    Not on file   Social History Narrative    Not on file     Social Determinants of Health     Financial Resource Strain: Low Risk  (2024)    Overall Financial Resource Strain (CARDIA)     Difficulty of Paying Living Expenses: Not hard at all   Food Insecurity: No Food Insecurity (2021)    Received [facility] [address] [facility]    Hunger Vital Sign     Worried About Running Out of Food in the Last Year: Never true     Ran Out of Food in the Last Year: Never true   Transportation Needs: No Transportation Needs (2024)    PRAPARE - Transportation     Lack of Transportation (Medical): No     Lack of Transportation (Non-Medical): No   Physical Activity: Not on file   Stress: Not on file   Social Connections: Not on file   Intimate Partner Violence: Not on file   Housing Stability: Unknown (2024)    Housing Stability Vital Sign     Unable to Pay for Housing in the Last Year: No     Number of Places Lived in the Last Year: Not on file     Unstable Housing in the Last Year: No       Surgical History  Past Surgical History:   Procedure Laterality Date    OTHER SURGICAL HISTORY  10/27/2020    Cholecystectomy    OTHER SURGICAL HISTORY  10/27/2020    Cervical laminectomy    OTHER SURGICAL HISTORY  10/27/2020    Colonoscopy    OTHER SURGICAL HISTORY  2022    Percutaneous transluminal coronary angioplasty    OTHER SURGICAL HISTORY  2022    Cardiac catheterization with stent placement    OTHER SURGICAL HISTORY  2022    Tonsillectomy with adenoidectomy       Physical  Exam:  GENERAL:  Patient is awake, alert, and oriented to person place and time.  Patient appears well nourished and well kept.  Affect Calm, Not Acutely Distressed.  HEENT:  Normocephalic, Atraumatic, EOMI  CARDIOVASCULAR:  Hemodynamically stable.  RESPIRATORY:  Normal respirations with unlabored breathing.  Extremity: Left shoulder shows skin is intact.  There is no erythema or warmth.  There is no clinical signs of infection.  Can forward flex to 140 degrees with pain, passively to 170 degrees.  Abduction to 130 degrees.  External rotation from neutral at 55 degrees.  Internal rotation at the level of the left hip.  There are signs of impingement with Brower and Neer's test.  Negative empty can test.  Negative crossarm test.  There is  pain over the AC joint.  Negative Newton's test.  Pain over the long head of biceps tendon.  Negative sulcus sign.  Negative anterior apprehension's test.  Neurovascularly intact.  Right shoulder was examined for comparison.      Diagnostics: X-rays reviewed      Procedure: None    Assessment: Left rotator cuff tendinitis    Plan: Emiliano presents today for initial evaluation for acute left shoulder pain. No trauma or fall. X-rays showed no obvious fractures. We placed him on oral prednisone 50 mg for 5 days and physical therapy. He will follow-up in 10-14 days for reevaluation.  If no improvement may consider further imaging possible subacromial corticosteroid injection.    Orders Placed This Encounter    XR shoulder left 2+ views      At the conclusion of the visit there were no further questions by the patient/family regarding their plan of care.  Patient was instructed to call or return with any issues, questions, or concerns regarding their injury and/or treatment plan described above.     06/05/24 at 8:50 AM - Maryse Gale MD  Scribe Attestation  By signing my name below, ILeeroy Scribe   attest that this documentation has been prepared under the direction  and in the presence of Maryse Gale MD.    Office: [phone]    This note was prepared using voice recognition software.  The details of this note are correct and have been reviewed, and corrected to the best of my ability.  Some grammatical errors may persist related to the Dragon software.

## 2024-06-12 ENCOUNTER — EVALUATION (OUTPATIENT)
Dept: PHYSICAL THERAPY | Facility: CLINIC | Age: 64
End: 2024-06-12
Payer: MEDICARE

## 2024-06-12 DIAGNOSIS — M75.82 ROTATOR CUFF TENDINITIS, LEFT: ICD-10-CM

## 2024-06-12 PROCEDURE — 97140 MANUAL THERAPY 1/> REGIONS: CPT | Mod: GP | Performed by: PHYSICAL THERAPIST

## 2024-06-12 PROCEDURE — 97110 THERAPEUTIC EXERCISES: CPT | Mod: GP | Performed by: PHYSICAL THERAPIST

## 2024-06-12 PROCEDURE — 97162 PT EVAL MOD COMPLEX 30 MIN: CPT | Mod: GP | Performed by: PHYSICAL THERAPIST

## 2024-06-12 NOTE — PROGRESS NOTES
Physical Therapy Evaluation and Treatment      Patient Name: Emiliano Mann  MRN: [id number]  Today's Date: 6/13/2024  Time Calculation  Start Time: 0301  Stop Time: 0348  Time Calculation (min): 47 min      PT Evaluation Time Entry  PT Evaluation (Moderate) Time Entry: 20  PT Therapeutic Procedures Time Entry  Manual Therapy Time Entry: 18  Therapeutic Exercise Time Entry: 8                   INSURANCE:  Visit number: 1/8  MEDICARE/GEN MEDICARE 2230 ($0 USED ) COPAY 0     Referring Provider: Dr. Maryse Gale  Hx: CAD with 8 stents and transaortic valve replacement, Emphysema/COPD    ASSESSMENT:  PT Assessment Results: decreased knowledge of HEP, activity limitations, ADLs/IADLs/self care skills, flexibility, motor function/control/tone, pain, participation restrictions, range of motion/joint mobility, strength, posture, transfers, coordination, decreased knowledge of precautions.  Rehab Prognosis: Good  Evaluation/Treatment Tolerance: Patient tolerated treatment well    Emiliano Mann is a 64 y.o. male presenting to the clinic with s/s consistent with L RTC tendonitis. Pt demonstrates decreased ROM and strength of the L shoulder causing pain and dysfunction with reaching, lifting, dressing, driving, and pulling. Pt was given and reviewed HEP including postural exercises. Pt will benefit from skilled PT in order to increase ROM and strength of the L shoulder so that the pt can perform ADLs without pain and return to PLOF.     PLAN:  Treatments/Interventions: gait training, traction, dry needling, edema control, education/instruction, home program, self care/home management, manual therapy, neuromuscular re-education, therapeutic activities, therapeutic exercises, modalities, therapeutic elastic taping.   PT Plan: Skilled PT  PT Frequency: 2 times per week  Duration: 8 visits  Onset Date: 05/15/24  Rehab Potential: Good  Plan of Care Agreement: Patient    Goals -    Pt will report less than a 2/10 pain at the  worst.  Pt will report a significant improvement with Quick DASH score.  Pt will have at least 4+/5 strength for the left shoulder.  Pt will have AROM to WFL for the left shoulder.  Pt will be independent with HEP.    CURRENT PROBLEM:   1. Rotator cuff tendinitis, left  Referral to Physical Therapy    Follow Up In Physical Therapy          Subjective    General -    L shoulder pain started about 3-4 weeks ago. Pt states that it started out of the blue and was sore as heck, but does not know how he hurt it. Pt reports that he has difficulty with raising it, driving, sleeping on the L side, reaching for a seatbelt, and putting on a coat or a life-jacket.     Mechanism of Injury -    Insidious Onset    History of Current Episode - 3-4 weeks ago    Pain -    Pain Location: L shoulder  Pain Best: 4-5/10  Pain Today: 6-7/10  Pain Worst: 8/10   Pain Type: Constant, Achy/Dull, Sharp, Tightness, intermittent numbness/tingling, intermittent neck pain    Pain Exacerbating Factors: reaching, lifting, dressing, driving, and pulling.  Pain Relieving Factors: Slight improvement with prednisone, Rest, Advil PM or Tylenol Arthritis, Ice    Difficulty Sleeping: Yes  Night Sweats, Loss of Appetite, Unexpected Weight-loss: No    Work -   Work Status: Retired    Home Living -    Pt lives with his wife.    Patient Stated Goals -  Reduce pain    PRECAUTIONS -    None    Prior Level of Function -    I with ADLs/IADLs     Objective     Shoulder AROM -  L shoulder Flexion: 100 with pain   L shoulder Abduction: 80 with pain   L shoulder ER: 38 with pain   L shoulder Functional IR reach: coccyx     Shoulder MMT (/5) -  R shoulder Flexion: 4   R shoulder Abduction: 4   R shoulder ER: 4-   R shoulder IR: 4   L shoulder Flexion: 4- with pain    L shoulder Abduction: 3+ with pain   L shoulder ER: 4-   L shoulder IR: 4     Palpation -  TTP L biceps tendon, UT/levator, supraspinatus tendon    Special Tests -   Hawkin's Dino positive left  Lorena  positive left  Empty Can positive left  Speed's positive left  Belly Press positive left     Posture -   Elevated Scapula  Rounded Shoulders  Forward Head    Verbal Informed Consent Given for Integrative Dry Needling -  Spinal Accessory Homeostatic Neuro-trigger Point (1, 50 mm) ea B  Dorsal Scapular Homeostatic Neuro-trigger Point (1, 30 mm) ea B  Suprascapular Homeostatic Neuro-trigger Point (1, 50 mm) L     Outcome Measures -   Other Measures  Disability of Arm Shoulder Hand (DASH): 20 (% Quick)     Treatment -   Evaluation -  Moderate (41134)  Therapeutic Exercise (73847) -     Seated Correct Posture: reviewed  Seated Scapular Retraction: x10  Seated/Supine Cervical Retraction: x10  UT/Levator Scapulae Stretch: 30 sec ea  Towel Slides Flexion/Scaption at Table: reviewed  T-band B ER: GTB x10  T-band Row: GTB x10  Manual Therapy (96998) -    Integrative Dry Needling    OP Patient Education -   Access Code: [email or web address]  URL: [email or web address]  Date: 06/12/2024  Prepared by: Mala Grady    Exercises  - Seated Correct Posture   - Seated Scapular Retraction  - 1-2 x daily - 2 sets - 10 reps - 2 seconds hold  - Seated Cervical Retraction  - 1-2 x daily - 2 sets - 10 reps - 2 seconds hold  - Supine Cervical Retraction with Towel  - 1-2 x daily - 2 sets - 10 reps - 2 seconds hold  - Seated Upper Trapezius Stretch  - 1-2 x daily - 3 sets - 30 seconds hold  - Gentle Levator Scapulae Stretch  - 1-2 x daily - 3 sets - 30 seconds hold  - Seated Bilateral Shoulder Flexion Towel Slide at Table Top  - 1-2 x daily - 2 sets - 10 reps  - Seated Shoulder Towel Slides Scaption at Table  - 1-2 x daily - 2 sets - 10 reps  - Shoulder External Rotation and Scapular Retraction with Resistance  - 1-2 x daily - 2 sets - 10 reps  - Standing Row with Anchored Resistance  - 1-2 x daily - 2 sets - 10 reps

## 2024-06-13 ASSESSMENT — PATIENT HEALTH QUESTIONNAIRE - PHQ9
1. LITTLE INTEREST OR PLEASURE IN DOING THINGS: NOT AT ALL
SUM OF ALL RESPONSES TO PHQ9 QUESTIONS 1 AND 2: 0
2. FEELING DOWN, DEPRESSED OR HOPELESS: NOT AT ALL

## 2024-06-14 ENCOUNTER — APPOINTMENT (OUTPATIENT)
Dept: PHYSICAL THERAPY | Facility: CLINIC | Age: 64
End: 2024-06-14
Payer: MEDICARE

## 2024-06-14 DIAGNOSIS — M75.82 ROTATOR CUFF TENDINITIS, LEFT: Primary | ICD-10-CM

## 2024-06-14 NOTE — PROGRESS NOTES
Physical Therapy Treatment    Patient Name: Emiliano Mann  MRN: [id number]  Today's Date: 6/14/2024             Current Problem  1. Rotator cuff tendinitis, left            Subjective   General     Insurance   Visit number: 2/8  MEDICARE/GEN MEDICARE 2230 ($0 USED ) COPAY 0   Precautions     Pain       Objective   Treatments:    Assessment     Plan:  Progress with POC as tolerated.    OP EDUCATION:       Goals:

## 2024-06-21 ENCOUNTER — OFFICE VISIT (OUTPATIENT)
Dept: ORTHOPEDIC SURGERY | Facility: CLINIC | Age: 64
End: 2024-06-21
Payer: MEDICARE

## 2024-06-21 ENCOUNTER — HOSPITAL ENCOUNTER (OUTPATIENT)
Dept: RADIOLOGY | Facility: EXTERNAL LOCATION | Age: 64
Discharge: HOME | End: 2024-06-21

## 2024-06-21 DIAGNOSIS — S46.012A TRAUMATIC INCOMPLETE TEAR OF LEFT ROTATOR CUFF, INITIAL ENCOUNTER: Primary | ICD-10-CM

## 2024-06-21 DIAGNOSIS — M75.82 ROTATOR CUFF TENDINITIS, LEFT: ICD-10-CM

## 2024-06-21 PROCEDURE — 99213 OFFICE O/P EST LOW 20 MIN: CPT | Performed by: INTERNAL MEDICINE

## 2024-06-21 PROCEDURE — 2500000005 HC RX 250 GENERAL PHARMACY W/O HCPCS: Performed by: INTERNAL MEDICINE

## 2024-06-21 PROCEDURE — 3008F BODY MASS INDEX DOCD: CPT | Performed by: INTERNAL MEDICINE

## 2024-06-21 PROCEDURE — 2500000004 HC RX 250 GENERAL PHARMACY W/ HCPCS (ALT 636 FOR OP/ED): Performed by: INTERNAL MEDICINE

## 2024-06-21 PROCEDURE — 20611 DRAIN/INJ JOINT/BURSA W/US: CPT | Mod: LT | Performed by: INTERNAL MEDICINE

## 2024-06-21 RX ORDER — LIDOCAINE HYDROCHLORIDE 10 MG/ML
6 INJECTION INFILTRATION; PERINEURAL ONCE
Status: COMPLETED | OUTPATIENT
Start: 2024-06-21 | End: 2024-06-21

## 2024-06-21 RX ORDER — BETAMETHASONE SODIUM PHOSPHATE AND BETAMETHASONE ACETATE 3; 3 MG/ML; MG/ML
3 INJECTION, SUSPENSION INTRA-ARTICULAR; INTRALESIONAL; INTRAMUSCULAR; SOFT TISSUE
Status: COMPLETED | OUTPATIENT
Start: 2024-06-21 | End: 2024-06-21

## 2024-06-21 RX ORDER — BETAMETHASONE SODIUM PHOSPHATE AND BETAMETHASONE ACETATE 3; 3 MG/ML; MG/ML
18 INJECTION, SUSPENSION INTRA-ARTICULAR; INTRALESIONAL; INTRAMUSCULAR; SOFT TISSUE ONCE
Status: COMPLETED | OUTPATIENT
Start: 2024-06-21 | End: 2024-06-21

## 2024-06-21 RX ORDER — LIDOCAINE HYDROCHLORIDE 10 MG/ML
6 INJECTION INFILTRATION; PERINEURAL
Status: COMPLETED | OUTPATIENT
Start: 2024-06-21 | End: 2024-06-21

## 2024-06-21 NOTE — PROGRESS NOTES
CC:   No chief complaint on file.      HPI: Emiliano is a 64 y.o. male presents today for reevaluation for left rotator cuff tendinitis and partial rotator cuff tear. He states that he did not get significant relief from the oral steroids.        Review of Systems   GENERAL: Negative for malaise, significant weight loss, fever  MUSCULOSKELETAL: See HPI  NEURO:  Negative for numbness / tingling     Past Medical History  Past Medical History:   Diagnosis Date    Personal history of other diseases of the circulatory system 10/20/2022    History of aortic valve stenosis       Medication review  Medication Documentation Review Audit       Reviewed by Nolvia Briggs (Pharmacist) on 03/12/24 at 1209      Medication Order Taking? Sig Documenting Provider Last Dose Status     Discontinued 03/12/24 1156   amLODIPine (Norvasc) 10 mg tablet [id number] Yes Take 1 tablet (10 mg) by mouth once daily. Arnoldo Mota MD Taking Active   amoxicillin (Amoxil) 500 mg capsule [id number] Yes Take all four capsules 30-60 minutes before dental procedure for a total of 2g.   Patient taking differently: Take 1 capsule (500 mg) by mouth every 6 hours.    Radha Maurice APRN-CNP Taking Differently Active   ASCORBIC ACID, VITAMIN C, ORAL [id number] Yes Take 1 tablet by mouth 3 times a week. Historical MD Vivek Taking Active     Discontinued 03/11/24 1551   aspirin 81 mg EC tablet [id number] Yes Take 1 tablet (81 mg) by mouth 3 times a week. Historical MD Vivek Taking Active   calcium carbonate EX (Tums Extra Strength) 300 mg (750 mg) chewable tablet [id number] Yes Chew 600 mg as needed at bedtime for indigestion or heartburn. Anderson Doyle MD Taking Active   carvedilol (Coreg) 25 mg tablet [id number] Yes Take 1 tablet (25 mg) by mouth 2 times a day with meals. Anderson Doyle MD Taking Active   CHOLECALCIFEROL, VITAMIN D3, ORAL [id number] Yes Take 50,000 Units by mouth once daily. Historical MD Vivek Taking Active    famotidine (Pepcid) 20 mg tablet [id number] Yes Take 1 tablet (20 mg) by mouth once daily as needed for heartburn. Anderson Doyle MD Taking Active   fish,bora,flax oils-om3,6,9no1 (Omega 3-6-9) 1,200 mg capsule [id number] Yes Take 1 capsule by mouth once daily. Anderson Doyle MD Taking Active   multivitamin tablet [id number] Yes Take 1 tablet by mouth 3 times a week. Anderson Doyle MD Taking Active   Discontinued 24 1208   nitroglycerin (Nitrostat) 0.4 mg SL tablet [id number] Yes Place 1 tablet (0.4 mg) under the tongue every 5 minutes if needed for chest pain. DISSOLVE 1 TABLET UNDER THE TONGUE AS NEEDED FOR CHEST PAIN- MAY REPEAT EVERY 5 MINUTES IF NEEDED (MAX 3 DOSES.- IF NO RELIEF CALL 911) Anderson Doyle MD Taking Active   omeprazole OTC (PriLOSEC OTC) 20 mg EC tablet [id number] Yes Take 1 tablet (20 mg) by mouth once daily as needed (acid reflux). Do not crush, chew, or split. Anderson Doyle MD Taking Active   vitamin B complex tablet [id number] Yes Take 1 tablet by mouth once daily. Historical MD Vivek Taking Active                    Allergies  Allergies   Allergen Reactions    Clopidogrel Bleeding     Patient is unsure if he used it in past    Ezetimibe Myalgia    Lisinopril Cough    Losartan Potassium Dizziness     Patient had vertigo    Morphine Nausea/vomiting    Statins-Hmg-Coa Reductase Inhibitors Myalgia     Leg, thighs cramps  Hx of pravastatin, simvastatin       Social History  Social History     Socioeconomic History    Marital status:      Spouse name: Not on file    Number of children: Not on file    Years of education: Not on file    Highest education level: Not on file   Occupational History    Not on file   Tobacco Use    Smoking status: Former     Current packs/day: 0.00     Types: Cigarettes     Quit date:      Years since quittin.4    Smokeless tobacco: Never   Substance and Sexual Activity    Alcohol use: Yes     Alcohol/week: 1.0 standard  drink of alcohol     Types: 1 Shots of liquor per week    Drug use: Never    Sexual activity: Not on file   Other Topics Concern    Not on file   Social History Narrative    Not on file     Social Determinants of Health     Financial Resource Strain: Low Risk  (2/27/2024)    Overall Financial Resource Strain (CARDIA)     Difficulty of Paying Living Expenses: Not hard at all   Food Insecurity: No Food Insecurity (5/20/2021)    Received from [facility]    Hunger Vital Sign     Worried About Running Out of Food in the Last Year: Never true     Ran Out of Food in the Last Year: Never true   Transportation Needs: No Transportation Needs (2/27/2024)    PRAPARE - Transportation     Lack of Transportation (Medical): No     Lack of Transportation (Non-Medical): No   Physical Activity: Not on file   Stress: Not on file   Social Connections: Not on file   Intimate Partner Violence: Not on file   Housing Stability: Unknown (2/27/2024)    Housing Stability Vital Sign     Unable to Pay for Housing in the Last Year: No     Number of Places Lived in the Last Year: Not on file     Unstable Housing in the Last Year: No       Surgical History  Past Surgical History:   Procedure Laterality Date    OTHER SURGICAL HISTORY  10/27/2020    Cholecystectomy    OTHER SURGICAL HISTORY  10/27/2020    Cervical laminectomy    OTHER SURGICAL HISTORY  10/27/2020    Colonoscopy    OTHER SURGICAL HISTORY  04/13/2022    Percutaneous transluminal coronary angioplasty    OTHER SURGICAL HISTORY  04/13/2022    Cardiac catheterization with stent placement    OTHER SURGICAL HISTORY  04/13/2022    Tonsillectomy with adenoidectomy       Physical Exam:  GENERAL:  Patient is awake, alert, and oriented to person place and time.  Patient appears well nourished and well kept.  Affect Calm, Not Acutely Distressed.  HEENT:  Normocephalic, Atraumatic, EOMI  CARDIOVASCULAR:  Hemodynamically stable.  RESPIRATORY:  Normal respirations with  unlabored breathing.  Extremity:  Left shoulder shows skin is intact.  There is no erythema or warmth.  There is no clinical signs of infection.  Can forward flex to 140 degrees with pain, passively to 170 degrees.  Abduction to 130 degrees.  External rotation from neutral at 55 degrees.  Internal rotation at the level of the left hip.  There are signs of impingement with Brower and Neer's test.  Positive empty can test with supraspinatus weakness.  Negative crossarm test.  There is  pain over the AC joint.  Negative Walsh's test.  Pain over the long head of biceps tendon.  Negative sulcus sign.  Negative anterior apprehension's test.  Neurovascularly intact.  Right shoulder was examined for comparison.       Diagnostics: None today   XR shoulder left 2+ views  Interpreted By:  Maryse Chanel,   STUDY:  XR SHOULDER LEFT 2+ VIEWS, 6/5/2024 8:38 am      INDICATION:  Signs/Symptoms:pain      ACCESSION NUMBER(S):  [id number]      ORDERING CLINICIAN:  MARYSE CHANEL      FINDINGS:  Left shoulder x-rays four views: No acute fractures, no dislocation.  No significant degenerative changes.          Signed by: Maryse Chanel 6/5/2024 12:55 PM  Dictation workstation:   [id number]        Procedure: L Inj/Asp: L subacromial bursa on 6/21/2024 1:58 PM  Indications: pain  Details: 22 G needle, ultrasound-guided lateral approach  Medications: 3 mL betamethasone acet,sod phos 6 mg/mL; 6 mL lidocaine 10 mg/mL (1 %)  Outcome: tolerated well, no immediate complications  Procedure, treatment alternatives, risks and benefits explained, specific risks discussed. Consent was given by the patient. Immediately prior to procedure a time out was called to verify the correct patient, procedure, equipment, support staff and site/side marked as required. Patient was prepped and draped in the usual sterile fashion.             Assessment:  Left rotator cuff tear    Plan: Emiliano  presents today for reevaluation for left shoulder injury.  He got minimal relief from the oral steroids. We  did offered an ultrasound-guided subacromial corticosteroid injection to the left shoulder today, he was agreeable to this plan. We also recommended MRI of the left shoulder for preoperative planning and to evaluate for left rotator cuff tear.  He got minimal relief after today's injection, he will continue his home PT program and follow-up after the MRI, and discuss treatment options pending MRI results.    No orders of the defined types were placed in this encounter.     At the conclusion of the visit there were no further questions by the patient/family regarding their plan of care.  Patient was instructed to call or return with any issues, questions, or concerns regarding their injury and/or treatment plan described above.     06/21/24 at 1:15 PM - Maryse Gale MD  Scribe Attestation  By signing my name below, I, Leeroy Espositomo, Scribe   attest that this documentation has been prepared under the direction and in the presence of Maryse Gale MD.    Office: [phone]    This note was prepared using voice recognition software.  The details of this note are correct and have been reviewed, and corrected to the best of my ability.  Some grammatical errors may persist related to the Dragon software.

## 2024-06-26 ENCOUNTER — APPOINTMENT (OUTPATIENT)
Dept: PHYSICAL THERAPY | Facility: CLINIC | Age: 64
End: 2024-06-26
Payer: MEDICARE

## 2024-07-12 ENCOUNTER — HOSPITAL ENCOUNTER (OUTPATIENT)
Dept: RADIOLOGY | Facility: HOSPITAL | Age: 64
Discharge: HOME | End: 2024-07-12
Payer: MEDICARE

## 2024-07-12 PROCEDURE — 73221 MRI JOINT UPR EXTREM W/O DYE: CPT | Mod: LT

## 2024-07-22 ENCOUNTER — APPOINTMENT (OUTPATIENT)
Dept: ORTHOPEDIC SURGERY | Facility: CLINIC | Age: 64
End: 2024-07-22
Payer: MEDICARE

## 2024-07-22 DIAGNOSIS — M19.012 PRIMARY OSTEOARTHRITIS OF LEFT SHOULDER: ICD-10-CM

## 2024-07-22 PROCEDURE — 1036F TOBACCO NON-USER: CPT | Performed by: ORTHOPAEDIC SURGERY

## 2024-07-22 PROCEDURE — 1159F MED LIST DOCD IN RCRD: CPT | Performed by: ORTHOPAEDIC SURGERY

## 2024-07-22 PROCEDURE — 99213 OFFICE O/P EST LOW 20 MIN: CPT | Performed by: ORTHOPAEDIC SURGERY

## 2024-07-22 NOTE — PROGRESS NOTES
History of present illness: Left shoulder pain continues despite rest activity modification and oral NSAIDs even at night and a cortisone shot he follows up after MRI and a visit with my other partner    Physical exam:    General: No acute distress or breathing difficulty or discomfort, pleasant and cooperative with the examination.    Extremities:     The left shoulder was inspected and was found to have no obvious deformity.  There was tenderness to touch over the lateral edges of the shoulder over the rotator cuff insertion.  Active forward flexion 110 degrees, external rotation to 50 degrees, abduction to 45 degrees, and internal rotation to the level of L2.    At this time the shoulder is neurovascular tact and neurosensory intact.  Motor intact C5-T1.  There was no obvious neck pain or radiculopathy noted.  There was no gross instability or adhesive capsulitis symptoms.  There was no evidence of apprehension or apprehension suppression.    Strength was tested in 4 planes with weakness in the supraspinatus strength testing and external rotation position.  There was no strength deficit in internal rotation.  Impingement signs were positive both supine and standing for impingement test type I and II.  There was mild pain over the bicipital groove with a positive speeds sign    Impression: Left shoulder cuff tendinitis, bicipital tendinitis with secondary impingement underlying mild osteoarthritis of the glenohumeral joint with some degenerative labral tear          MRI imaging  Plan: Fluoro-guided intra-articular injection may give him more relief    If he fails this we would talk about a one-time arthroscopic cleanup or cleanout of the glenohumeral joint labrum probable bicep tenodesis and decompression his cuff is starting to delaminated his arthritic change is mild so organ to hold off in any larger type surgery we will see how he does with the single intra-articular injection when he returns in a month

## 2024-08-01 ENCOUNTER — APPOINTMENT (OUTPATIENT)
Dept: ORTHOPEDIC SURGERY | Facility: CLINIC | Age: 64
End: 2024-08-01
Payer: MEDICARE

## 2024-08-08 ENCOUNTER — APPOINTMENT (OUTPATIENT)
Dept: PRIMARY CARE | Facility: CLINIC | Age: 64
End: 2024-08-08
Payer: MEDICARE

## 2024-08-08 VITALS
WEIGHT: 218 LBS | TEMPERATURE: 97.9 F | HEIGHT: 68 IN | HEART RATE: 82 BPM | DIASTOLIC BLOOD PRESSURE: 75 MMHG | RESPIRATION RATE: 18 BRPM | BODY MASS INDEX: 33.04 KG/M2 | SYSTOLIC BLOOD PRESSURE: 136 MMHG

## 2024-08-08 DIAGNOSIS — Z13.6 SCREENING FOR AAA (ABDOMINAL AORTIC ANEURYSM): ICD-10-CM

## 2024-08-08 DIAGNOSIS — I10 HTN (HYPERTENSION), BENIGN: ICD-10-CM

## 2024-08-08 DIAGNOSIS — L98.9 LESION OF SKIN OF FACE: ICD-10-CM

## 2024-08-08 DIAGNOSIS — Z00.00 ROUTINE GENERAL MEDICAL EXAMINATION AT HEALTH CARE FACILITY: Primary | ICD-10-CM

## 2024-08-08 PROCEDURE — 1036F TOBACCO NON-USER: CPT | Performed by: FAMILY MEDICINE

## 2024-08-08 PROCEDURE — G0009 ADMIN PNEUMOCOCCAL VACCINE: HCPCS | Performed by: FAMILY MEDICINE

## 2024-08-08 PROCEDURE — 3075F SYST BP GE 130 - 139MM HG: CPT | Performed by: FAMILY MEDICINE

## 2024-08-08 PROCEDURE — 1123F ACP DISCUSS/DSCN MKR DOCD: CPT | Performed by: FAMILY MEDICINE

## 2024-08-08 PROCEDURE — 99497 ADVNCD CARE PLAN 30 MIN: CPT | Performed by: FAMILY MEDICINE

## 2024-08-08 PROCEDURE — 1170F FXNL STATUS ASSESSED: CPT | Performed by: FAMILY MEDICINE

## 2024-08-08 PROCEDURE — 1159F MED LIST DOCD IN RCRD: CPT | Performed by: FAMILY MEDICINE

## 2024-08-08 PROCEDURE — 3078F DIAST BP <80 MM HG: CPT | Performed by: FAMILY MEDICINE

## 2024-08-08 PROCEDURE — 1158F ADVNC CARE PLAN TLK DOCD: CPT | Performed by: FAMILY MEDICINE

## 2024-08-08 PROCEDURE — 3008F BODY MASS INDEX DOCD: CPT | Performed by: FAMILY MEDICINE

## 2024-08-08 PROCEDURE — 90677 PCV20 VACCINE IM: CPT | Performed by: FAMILY MEDICINE

## 2024-08-08 PROCEDURE — G0439 PPPS, SUBSEQ VISIT: HCPCS | Performed by: FAMILY MEDICINE

## 2024-08-08 ASSESSMENT — ENCOUNTER SYMPTOMS
OCCASIONAL FEELINGS OF UNSTEADINESS: 0
DEPRESSION: 0
LOSS OF SENSATION IN FEET: 0

## 2024-08-08 ASSESSMENT — ACTIVITIES OF DAILY LIVING (ADL)
TAKING_MEDICATION: INDEPENDENT
BATHING: INDEPENDENT
GROCERY_SHOPPING: INDEPENDENT
MANAGING_FINANCES: INDEPENDENT
DOING_HOUSEWORK: INDEPENDENT
DRESSING: INDEPENDENT

## 2024-08-08 ASSESSMENT — PATIENT HEALTH QUESTIONNAIRE - PHQ9
2. FEELING DOWN, DEPRESSED OR HOPELESS: NOT AT ALL
SUM OF ALL RESPONSES TO PHQ9 QUESTIONS 1 AND 2: 0
1. LITTLE INTEREST OR PLEASURE IN DOING THINGS: NOT AT ALL

## 2024-08-08 NOTE — PROGRESS NOTES
"Subjective   Reason for Visit: Emiliano Mann is an 64 y.o. male here for a Medicare Wellness visit.     Past Medical, Surgical, and Family History reviewed and updated in chart.    Reviewed all medications by prescribing practitioner or clinical pharmacist (such as prescriptions, OTCs, herbal therapies and supplements) and documented in the medical record.    HPI    Patient Care Team:  Arnoldo Mota MD as PCP - General  [facility] M[facility]     Review of Systems    Objective   Vitals:  /75   Pulse 82   Temp 36.6 °C (97.9 °F)   Resp 18   Ht 1.727 m (5' 8\")   Wt 98.9 kg (218 lb)   BMI 33.15 kg/m²       Physical Exam  Constitutional:       Appearance: Normal appearance.   HENT:      Head: Normocephalic.   Eyes:      Conjunctiva/sclera: Conjunctivae normal.   Cardiovascular:      Rate and Rhythm: Normal rate and regular rhythm.      Heart sounds: Normal heart sounds.   Pulmonary:      Effort: Pulmonary effort is normal.      Breath sounds: Normal breath sounds.   Musculoskeletal:      Cervical back: Neck supple.   Skin:     General: Skin is warm and dry.   Neurological:      Mental Status: He is alert.       Assessment/Plan   Assessment & Plan  Routine general medical examination at health care facility    Orders:    1 Year Follow Up In [facility] - PCP - Wellness Exam; Future    HTN (hypertension), benign  This is well-controlled, we will continue amlodipine.  He is also on aspirin for secondary prevention of coronary artery disease  Orders:    Follow Up In [facility] - PCP - Established    Screening for AAA (abdominal aortic aneurysm)    Orders:    Vascular US Abdominal Aorta Aneurysm AAA Screening; Future    Lesion of skin of face  There is a very small 1 mm nonhealing lesion on the right cheek of the face.  This looks more like a skin abrasion that keeps getting really irritated when he shaves.  Is been present for 1 months.  There is no sign " of infection.  I am recommending that he refrain from shaving for 1 week and apply antibiotic ointment to the lesion every night at bedtime.  If it is not healed after this then he will let me know and I would refer him to a dermatologist for further evaluation            Health Counseling    Advance Directives Discussion  16 - 20 minutes were spent discussing Advanced Care Planning (including a Living Will, Medical Power Of , as well as specific end of life choices and/or directives). The details of that discussion were documented in Advanced Directives Discussion section of the medical record.

## 2024-08-08 NOTE — ASSESSMENT & PLAN NOTE
This is well-controlled, we will continue amlodipine.  He is also on aspirin for secondary prevention of coronary artery disease  Orders:    Follow Up In [facility] - PCP - Established

## 2024-08-14 ENCOUNTER — HOSPITAL ENCOUNTER (OUTPATIENT)
Dept: RADIOLOGY | Facility: HOSPITAL | Age: 64
Discharge: HOME | End: 2024-08-14
Payer: MEDICARE

## 2024-08-14 DIAGNOSIS — M19.012 PRIMARY OSTEOARTHRITIS OF LEFT SHOULDER: ICD-10-CM

## 2024-08-14 PROCEDURE — 2500000005 HC RX 250 GENERAL PHARMACY W/O HCPCS: Performed by: ORTHOPAEDIC SURGERY

## 2024-08-14 PROCEDURE — 2550000001 HC RX 255 CONTRASTS: Performed by: ORTHOPAEDIC SURGERY

## 2024-08-14 PROCEDURE — 96373 THER/PROPH/DIAG INJ IA: CPT | Performed by: ORTHOPAEDIC SURGERY

## 2024-08-14 PROCEDURE — 77002 NEEDLE LOCALIZATION BY XRAY: CPT | Mod: LT

## 2024-08-14 PROCEDURE — 2500000004 HC RX 250 GENERAL PHARMACY W/ HCPCS (ALT 636 FOR OP/ED): Performed by: ORTHOPAEDIC SURGERY

## 2024-08-14 RX ORDER — LIDOCAINE HYDROCHLORIDE 20 MG/ML
INJECTION, SOLUTION EPIDURAL; INFILTRATION; INTRACAUDAL; PERINEURAL AS NEEDED
Status: COMPLETED | OUTPATIENT
Start: 2024-08-14 | End: 2024-08-14

## 2024-08-14 RX ORDER — BUPIVACAINE HYDROCHLORIDE 5 MG/ML
INJECTION, SOLUTION EPIDURAL; INTRACAUDAL AS NEEDED
Status: COMPLETED | OUTPATIENT
Start: 2024-08-14 | End: 2024-08-14

## 2024-08-14 RX ORDER — METHYLPREDNISOLONE ACETATE 40 MG/ML
INJECTION, SUSPENSION INTRA-ARTICULAR; INTRALESIONAL; INTRAMUSCULAR; SOFT TISSUE AS NEEDED
Status: COMPLETED | OUTPATIENT
Start: 2024-08-14 | End: 2024-08-14

## 2024-08-26 ENCOUNTER — HOSPITAL ENCOUNTER (OUTPATIENT)
Dept: RADIOLOGY | Facility: HOSPITAL | Age: 64
Discharge: HOME | End: 2024-08-26
Payer: MEDICARE

## 2024-08-26 ENCOUNTER — APPOINTMENT (OUTPATIENT)
Dept: RADIOLOGY | Facility: HOSPITAL | Age: 64
End: 2024-08-26
Payer: MEDICARE

## 2024-08-26 DIAGNOSIS — Z13.6 SCREENING FOR AAA (ABDOMINAL AORTIC ANEURYSM): ICD-10-CM

## 2024-08-26 PROCEDURE — 76705 ECHO EXAM OF ABDOMEN: CPT | Performed by: RADIOLOGY

## 2024-08-26 PROCEDURE — 76706 US ABDL AORTA SCREEN AAA: CPT

## 2024-09-16 ENCOUNTER — OFFICE VISIT (OUTPATIENT)
Dept: ORTHOPEDIC SURGERY | Facility: CLINIC | Age: 64
End: 2024-09-16
Payer: MEDICARE

## 2024-09-16 ENCOUNTER — APPOINTMENT (OUTPATIENT)
Dept: ORTHOPEDIC SURGERY | Facility: CLINIC | Age: 64
End: 2024-09-16
Payer: COMMERCIAL

## 2024-09-16 DIAGNOSIS — M75.82 ROTATOR CUFF TENDINITIS, LEFT: Primary | ICD-10-CM

## 2024-09-16 PROCEDURE — 1157F ADVNC CARE PLAN IN RCRD: CPT | Performed by: ORTHOPAEDIC SURGERY

## 2024-09-16 PROCEDURE — 99213 OFFICE O/P EST LOW 20 MIN: CPT | Performed by: ORTHOPAEDIC SURGERY

## 2024-09-16 PROCEDURE — 1123F ACP DISCUSS/DSCN MKR DOCD: CPT | Performed by: ORTHOPAEDIC SURGERY

## 2024-09-16 NOTE — PROGRESS NOTES
History of present illness: History of left shoulder pain and arthritis patient underwent a fluoroscopic intra-articular injection at [facility] on August 14, 2024  Excellent result he is over 80% better even after 6 weeks physical exam:    General: No acute distress or breathing difficulty or discomfort, pleasant and cooperative with the examination.    Extremities: The left shoulder was inspected and was found to have no obvious deformity.  There was tenderness to touch over the lateral edges of the shoulder over the rotator cuff insertion.  Active forward flexion 110 degrees, external rotation to 50 degrees, abduction to 45 degrees, and internal rotation to the level of L2.    At this time the shoulder is neurovascular tact and neurosensory intact.  Motor intact C5-T1.  There was no obvious neck pain or radiculopathy noted.  There was no gross instability or adhesive capsulitis symptoms.  There was no evidence of apprehension or apprehension suppression.    Strength was tested in 4 planes with weakness in the supraspinatus strength testing and external rotation position.  There was no strength deficit in internal rotation.  Impingement signs were positive both supine and standing for impingement test type I and II.  There was mild pain over the bicipital groove with a positive speeds sign    Diagnostic studies: No new studies we did review the imaging studies    Impression: Patient doing extremely well after fluoroscopic intra-articular left shoulder injection    Plan: Patient now doing extremely well after injection intra-articular fluoroscopic guided we will see how long his shot lasts he can certainly have another shot maybe in 4 to 6 months hopefully he can hold off on any other additional surgery we talked about an arthroscopy a cleanout possible bicep as well but since he is doing so well we will wait to follow he will call us when his pain returns

## 2024-10-21 ENCOUNTER — HOSPITAL ENCOUNTER (OUTPATIENT)
Dept: CARDIOLOGY | Facility: CLINIC | Age: 64
Discharge: HOME | End: 2024-10-21
Payer: MEDICARE

## 2024-10-21 DIAGNOSIS — Z95.2 S/P TAVR (TRANSCATHETER AORTIC VALVE REPLACEMENT): ICD-10-CM

## 2024-10-28 ENCOUNTER — APPOINTMENT (OUTPATIENT)
Dept: CARDIOLOGY | Facility: CLINIC | Age: 64
End: 2024-10-28
Payer: MEDICARE

## 2024-10-28 VITALS
HEIGHT: 68 IN | WEIGHT: 215.9 LBS | HEART RATE: 84 BPM | BODY MASS INDEX: 32.72 KG/M2 | DIASTOLIC BLOOD PRESSURE: 92 MMHG | SYSTOLIC BLOOD PRESSURE: 156 MMHG

## 2024-10-28 DIAGNOSIS — I25.10 CAD S/P PERCUTANEOUS CORONARY ANGIOPLASTY: ICD-10-CM

## 2024-10-28 DIAGNOSIS — I25.2 HISTORY OF MI (MYOCARDIAL INFARCTION): ICD-10-CM

## 2024-10-28 DIAGNOSIS — E66.811 CLASS 1 OBESITY DUE TO EXCESS CALORIES WITH SERIOUS COMORBIDITY AND BODY MASS INDEX (BMI) OF 32.0 TO 32.9 IN ADULT: ICD-10-CM

## 2024-10-28 DIAGNOSIS — Z87.891 FORMER SMOKER: ICD-10-CM

## 2024-10-28 DIAGNOSIS — Z98.61 CAD S/P PERCUTANEOUS CORONARY ANGIOPLASTY: ICD-10-CM

## 2024-10-28 DIAGNOSIS — E78.2 MIXED HYPERLIPIDEMIA: ICD-10-CM

## 2024-10-28 DIAGNOSIS — Z95.2 S/P TAVR (TRANSCATHETER AORTIC VALVE REPLACEMENT): ICD-10-CM

## 2024-10-28 DIAGNOSIS — I10 HTN (HYPERTENSION), BENIGN: ICD-10-CM

## 2024-10-28 DIAGNOSIS — E66.09 CLASS 1 OBESITY DUE TO EXCESS CALORIES WITH SERIOUS COMORBIDITY AND BODY MASS INDEX (BMI) OF 32.0 TO 32.9 IN ADULT: ICD-10-CM

## 2024-10-28 PROCEDURE — 99214 OFFICE O/P EST MOD 30 MIN: CPT | Performed by: INTERNAL MEDICINE

## 2024-10-28 PROCEDURE — 3008F BODY MASS INDEX DOCD: CPT | Performed by: INTERNAL MEDICINE

## 2024-10-28 PROCEDURE — 3080F DIAST BP >= 90 MM HG: CPT | Performed by: INTERNAL MEDICINE

## 2024-10-28 PROCEDURE — 1157F ADVNC CARE PLAN IN RCRD: CPT | Performed by: INTERNAL MEDICINE

## 2024-10-28 PROCEDURE — 1123F ACP DISCUSS/DSCN MKR DOCD: CPT | Performed by: INTERNAL MEDICINE

## 2024-10-28 PROCEDURE — 1159F MED LIST DOCD IN RCRD: CPT | Performed by: INTERNAL MEDICINE

## 2024-10-28 PROCEDURE — 1036F TOBACCO NON-USER: CPT | Performed by: INTERNAL MEDICINE

## 2024-10-28 PROCEDURE — 3077F SYST BP >= 140 MM HG: CPT | Performed by: INTERNAL MEDICINE

## 2024-10-28 RX ORDER — EZETIMIBE 10 MG/1
10 TABLET ORAL DAILY
Qty: 90 TABLET | Refills: 3 | Status: SHIPPED | OUTPATIENT
Start: 2024-10-28 | End: 2025-10-28

## 2024-10-28 ASSESSMENT — ENCOUNTER SYMPTOMS
CONSTITUTIONAL NEGATIVE: 1
NEUROLOGICAL NEGATIVE: 1
RESPIRATORY NEGATIVE: 1
CARDIOVASCULAR NEGATIVE: 1

## 2024-10-30 ENCOUNTER — OFFICE VISIT (OUTPATIENT)
Dept: ORTHOPEDIC SURGERY | Facility: CLINIC | Age: 64
End: 2024-10-30
Payer: MEDICARE

## 2024-10-30 ENCOUNTER — HOSPITAL ENCOUNTER (OUTPATIENT)
Dept: RADIOLOGY | Facility: CLINIC | Age: 64
Discharge: HOME | End: 2024-10-30
Payer: MEDICARE

## 2024-10-30 DIAGNOSIS — M25.551 BILATERAL HIP PAIN: ICD-10-CM

## 2024-10-30 DIAGNOSIS — M25.552 BILATERAL HIP PAIN: ICD-10-CM

## 2024-10-30 DIAGNOSIS — M70.61 TROCHANTERIC BURSITIS OF BOTH HIPS: ICD-10-CM

## 2024-10-30 DIAGNOSIS — M16.0 OSTEOARTHRITIS OF BOTH HIPS, UNSPECIFIED OSTEOARTHRITIS TYPE: ICD-10-CM

## 2024-10-30 DIAGNOSIS — M47.817 DJD (DEGENERATIVE JOINT DISEASE), LUMBOSACRAL: ICD-10-CM

## 2024-10-30 DIAGNOSIS — M70.62 TROCHANTERIC BURSITIS OF BOTH HIPS: ICD-10-CM

## 2024-10-30 PROCEDURE — 1157F ADVNC CARE PLAN IN RCRD: CPT | Performed by: INTERNAL MEDICINE

## 2024-10-30 PROCEDURE — 99214 OFFICE O/P EST MOD 30 MIN: CPT | Performed by: INTERNAL MEDICINE

## 2024-10-30 PROCEDURE — 73522 X-RAY EXAM HIPS BI 3-4 VIEWS: CPT | Mod: BILATERAL PROCEDURE | Performed by: INTERNAL MEDICINE

## 2024-10-30 PROCEDURE — 99213 OFFICE O/P EST LOW 20 MIN: CPT | Performed by: INTERNAL MEDICINE

## 2024-10-30 PROCEDURE — 1123F ACP DISCUSS/DSCN MKR DOCD: CPT | Performed by: INTERNAL MEDICINE

## 2024-10-30 PROCEDURE — 73522 X-RAY EXAM HIPS BI 3-4 VIEWS: CPT

## 2024-10-30 RX ORDER — METHYLPREDNISOLONE 4 MG/1
TABLET ORAL
Qty: 1 EACH | Refills: 0 | Status: SHIPPED | OUTPATIENT
Start: 2024-10-30

## 2024-11-11 ENCOUNTER — OFFICE VISIT (OUTPATIENT)
Dept: URGENT CARE | Age: 64
End: 2024-11-11
Payer: MEDICARE

## 2024-11-11 VITALS
RESPIRATION RATE: 20 BRPM | WEIGHT: 205 LBS | OXYGEN SATURATION: 97 % | TEMPERATURE: 99.7 F | HEART RATE: 84 BPM | BODY MASS INDEX: 31.07 KG/M2 | DIASTOLIC BLOOD PRESSURE: 77 MMHG | HEIGHT: 68 IN | SYSTOLIC BLOOD PRESSURE: 133 MMHG

## 2024-11-11 DIAGNOSIS — J20.9 ACUTE BRONCHITIS, UNSPECIFIED ORGANISM: Primary | ICD-10-CM

## 2024-11-11 LAB
POC CORONAVIRUS SARS-COV-2 PCR: NEGATIVE
POC HUMAN RHINOVIRUS PCR: NEGATIVE
POC INFLUENZA A VIRUS PCR: NEGATIVE
POC INFLUENZA B VIRUS PCR: NEGATIVE
POC RESPIRATORY SYNCYTIAL VIRUS PCR: NEGATIVE

## 2024-11-11 PROCEDURE — 1125F AMNT PAIN NOTED PAIN PRSNT: CPT

## 2024-11-11 PROCEDURE — 1159F MED LIST DOCD IN RCRD: CPT

## 2024-11-11 PROCEDURE — 1157F ADVNC CARE PLAN IN RCRD: CPT

## 2024-11-11 PROCEDURE — 3008F BODY MASS INDEX DOCD: CPT

## 2024-11-11 PROCEDURE — 1123F ACP DISCUSS/DSCN MKR DOCD: CPT

## 2024-11-11 PROCEDURE — 1036F TOBACCO NON-USER: CPT

## 2024-11-11 PROCEDURE — 3075F SYST BP GE 130 - 139MM HG: CPT

## 2024-11-11 PROCEDURE — 1160F RVW MEDS BY RX/DR IN RCRD: CPT

## 2024-11-11 PROCEDURE — 87631 RESP VIRUS 3-5 TARGETS: CPT

## 2024-11-11 PROCEDURE — 99203 OFFICE O/P NEW LOW 30 MIN: CPT

## 2024-11-11 PROCEDURE — 3078F DIAST BP <80 MM HG: CPT

## 2024-11-11 RX ORDER — AZITHROMYCIN 250 MG/1
250 TABLET, FILM COATED ORAL DAILY
Qty: 6 TABLET | Refills: 0 | Status: SHIPPED | OUTPATIENT
Start: 2024-11-11 | End: 2024-11-17

## 2024-11-11 RX ORDER — BENZONATATE 200 MG/1
200 CAPSULE ORAL 3 TIMES DAILY PRN
Qty: 42 CAPSULE | Refills: 0 | Status: SHIPPED | OUTPATIENT
Start: 2024-11-11 | End: 2024-12-11

## 2024-11-11 ASSESSMENT — ENCOUNTER SYMPTOMS
SPEECH DIFFICULTY: 0
RHINORRHEA: 1
NUMBNESS: 0
DIZZINESS: 0
SHORTNESS OF BREATH: 0
PALPITATIONS: 0
FATIGUE: 1
LIGHT-HEADEDNESS: 0
TROUBLE SWALLOWING: 0
HEADACHES: 1
STRIDOR: 0
FACIAL SWELLING: 0
COUGH: 1
FEVER: 0
SORE THROAT: 1
DIAPHORESIS: 0
ACTIVITY CHANGE: 1
WEAKNESS: 0
FACIAL ASYMMETRY: 0
TREMORS: 0
WHEEZING: 0
CHILLS: 1
SEIZURES: 0
SINUS PRESSURE: 1
SINUS PAIN: 0
CHOKING: 0
VOICE CHANGE: 0
CHEST TIGHTNESS: 0

## 2024-11-11 ASSESSMENT — PAIN SCALES - GENERAL: PAINLEVEL_OUTOF10: 7

## 2024-11-11 NOTE — PATIENT INSTRUCTIONS
Drink enough clear liquids to prevent dehydration  Take medications as prescribed  Acetaminophen or ibuprofen for pain/fever  Mask in public  Wash hands frequently and sanitize surfaces to prevent spreading infection  Follow-up with your PCP or return here if you fail to improve or if you experience new or worsening symptoms  Go to nearest ER if - chest pain, difficulty breathing, dizziness, weakness, severe pain, or vomiting

## 2024-11-11 NOTE — PROGRESS NOTES
"Subjective   Patient ID: Emiliano Mann is a 64 y.o. male. They present today with a chief complaint of Cough (X 2 days) and Nasal Congestion (X 2 days).    History of Present Illness  Subjective  Emiliano Mann is a 64 y.o. male here for evaluation of a cough. The cough is non-productive, with shortness of breath during the cough, chest is painful during coughing, harsh, worsening over time and is aggravated by infection. Onset of symptoms was 2 days ago, gradually worsening since that time. Associated symptoms include chills, postnasal drip. Patient does not have a history of asthma. Patient has not had recent travel. Patient does not have a history of smoking. Patient has not had a previous chest x-ray.   @[facility]@     Objective  /77 (BP Location: Right arm, Patient Position: Sitting)   Pulse 84   Temp 37.6 °C (99.7 °F) (Oral)   Resp 20   Ht 1.727 m (5' 8\")   Wt 93 kg (205 lb)   SpO2 97%   BMI 31.17 kg/m²    Oxygen saturation 97% on room air      History provided by:  Patient   used: No    Cough  Associated symptoms include chills, headaches, postnasal drip, rhinorrhea and a sore throat. Pertinent negatives include no chest pain, ear pain, fever, shortness of breath or wheezing.       Past Medical History  Allergies as of 11/11/2024 - Reviewed 11/11/2024   Allergen Reaction Noted    Ezetimibe Myalgia 02/22/2022    Lisinopril Cough 04/26/2023    Losartan potassium Dizziness 09/08/2023    Morphine Nausea/vomiting 10/23/2015    Statins-hmg-coa reductase inhibitors Myalgia 09/12/2019       (Not in a hospital admission)       Past Medical History:   Diagnosis Date    Personal history of other diseases of the circulatory system 10/20/2022    History of aortic valve stenosis       Past Surgical History:   Procedure Laterality Date    OTHER SURGICAL HISTORY  10/27/2020    Cholecystectomy    OTHER SURGICAL HISTORY  10/27/2020    Cervical laminectomy    OTHER SURGICAL HISTORY  10/27/2020 " "   Colonoscopy    OTHER SURGICAL HISTORY  04/13/2022    Percutaneous transluminal coronary angioplasty    OTHER SURGICAL HISTORY  04/13/2022    Cardiac catheterization with stent placement    OTHER SURGICAL HISTORY  04/13/2022    Tonsillectomy with adenoidectomy        reports that he quit smoking about 24 years ago. His smoking use included cigarettes. He started smoking about 48 years ago. He has a 36 pack-year smoking history. He has never used smokeless tobacco. He reports that he does not currently use alcohol after a past usage of about 4.0 standard drinks of alcohol per week. He reports that he does not use drugs.    Review of Systems  Review of Systems   Constitutional:  Positive for activity change, chills and fatigue. Negative for diaphoresis and fever.   HENT:  Positive for congestion, postnasal drip, rhinorrhea, sinus pressure and sore throat. Negative for drooling, ear discharge, ear pain, facial swelling, sinus pain, tinnitus, trouble swallowing and voice change.    Respiratory:  Positive for cough. Negative for choking, chest tightness, shortness of breath, wheezing and stridor.    Cardiovascular:  Negative for chest pain and palpitations.   Neurological:  Positive for headaches. Negative for dizziness, tremors, seizures, syncope, facial asymmetry, speech difficulty, weakness, light-headedness and numbness.                                  Objective    Vitals:    11/11/24 0808   BP: 133/77   BP Location: Right arm   Patient Position: Sitting   Pulse: 84   Resp: 20   Temp: 37.6 °C (99.7 °F)   TempSrc: Oral   SpO2: 97%   Weight: 93 kg (205 lb)   Height: 1.727 m (5' 8\")     No LMP for male patient.    Physical Exam  Vitals and nursing note reviewed.   Constitutional:       General: He is not in acute distress.     Appearance: Normal appearance. He is normal weight. He is not ill-appearing, toxic-appearing or diaphoretic.   HENT:      Nose: Congestion and rhinorrhea present.      Right Turbinates: " Enlarged and swollen.      Left Turbinates: Enlarged and swollen.      Mouth/Throat:      Mouth: Mucous membranes are moist.      Pharynx: Oropharynx is clear. Postnasal drip present. No pharyngeal swelling, oropharyngeal exudate, posterior oropharyngeal erythema or uvula swelling.      Tonsils: No tonsillar exudate or tonsillar abscesses.   Eyes:      General:         Right eye: No discharge.         Left eye: No discharge.      Conjunctiva/sclera: Conjunctivae normal.   Cardiovascular:      Rate and Rhythm: Normal rate and regular rhythm.      Pulses: Normal pulses.      Heart sounds: Normal heart sounds.   Pulmonary:      Effort: Pulmonary effort is normal. No respiratory distress.      Breath sounds: Normal breath sounds. No wheezing.   Musculoskeletal:      Cervical back: Normal range of motion and neck supple. No rigidity or tenderness.   Lymphadenopathy:      Cervical: No cervical adenopathy.   Skin:     General: Skin is warm and dry.      Coloration: Skin is not pale.      Findings: No erythema.   Neurological:      General: No focal deficit present.      Mental Status: He is alert.         Procedures    Point of Care Test & Imaging Results from this visit  No results found for this visit on 11/11/24.   No results found.    Diagnostic study results (if any) were reviewed by JONATHON Nassar.    Assessment/Plan   Allergies, medications, history, and pertinent labs/EKGs/Imaging reviewed by JONATHON Nassar.     Medical Decision Making    Urgent Care Course: Patient presents to the ED with chills, rhinorrhea, cough, and malaise.  Patient is afebrile, hemodynamically stable.  Patient denies neck pain, cp, sob, ab pain, dysuria, diarrhea.  Patient without any nuchal rigidity.  Low suspicion for meningitis. COVID, rhinovirus, RSV, Influenza negative.  Patient educated about bronchitis.  Patient given prescription for azithromycin and benzonatate.  Patient and family given respiratory warning signs  and will f/u with pcp.  Patient and family understand plan.   Prior external records reviewed: Outpatient records  Reviewed pertinent findings from resulted labs:  Covid - positive, Flu - negative,rhinovirus - negative, RSV - negative  Imaging and EKG: Interpretation by radiology and independently interpreted by me. none  CXR - considered, but not performed  Independent Hx provided by: Patient  Med Rx considered but ultimately not given: Steroids   Social determinant that may affects healthcare: none  Pt's case/impression summarized and discussed with: Patient  Likely Dx given clinical picture: bronchitis  Although not an exhaustive list of Differential Diagnosis (though considered), patient's HPI, PE, and other findings are not suggestive of: RSV, flu, pneumonia, pneumothorax, pertussis  Patient at time of discharge was clinically well-appearing and HDS for outpatient management. The patient and/or family was given the opportunity to ask questions prior to discharge, understood my verbal discussion of the plans for treatment, expected course, indications to return to ED, and the need for timely follow up as directed.    Condition: Stable  Disposition: Discharge    Orders and Diagnoses  There are no diagnoses linked to this encounter.    Medical Admin Record      Patient disposition: Home    Electronically signed by JONATHON Nassar  8:12 AM

## 2024-11-26 ENCOUNTER — APPOINTMENT (OUTPATIENT)
Dept: ORTHOPEDIC SURGERY | Facility: CLINIC | Age: 64
End: 2024-11-26
Payer: COMMERCIAL

## 2024-11-26 DIAGNOSIS — M70.62 TROCHANTERIC BURSITIS OF BOTH HIPS: Primary | ICD-10-CM

## 2024-11-26 DIAGNOSIS — M47.817 DJD (DEGENERATIVE JOINT DISEASE), LUMBOSACRAL: ICD-10-CM

## 2024-11-26 DIAGNOSIS — M70.61 TROCHANTERIC BURSITIS OF BOTH HIPS: Primary | ICD-10-CM

## 2024-11-26 PROCEDURE — 1157F ADVNC CARE PLAN IN RCRD: CPT | Performed by: INTERNAL MEDICINE

## 2024-11-26 PROCEDURE — 99213 OFFICE O/P EST LOW 20 MIN: CPT | Performed by: INTERNAL MEDICINE

## 2024-11-26 PROCEDURE — 1123F ACP DISCUSS/DSCN MKR DOCD: CPT | Performed by: INTERNAL MEDICINE

## 2024-11-26 NOTE — PROGRESS NOTES
CC:   Chief Complaint   Patient presents with    Left Hip - Pain, Follow-up     OA, Trochanteric bursitis    Right Hip - Follow-up     OA, Trochanteric bursitis         HPI: Emiliano is a 64 y.o. male presents today for reevaluation for bilateral osteoarthritis and bilateral trochanteric bursitis. He states that he got minimal relief from the steroids. He notes difficulty ambulating.  Denies any tingling or numbness.        Review of Systems   GENERAL: Negative for malaise, significant weight loss, fever  MUSCULOSKELETAL: See HPI  NEURO:  Negative for numbness / tingling     Past Medical History  Past Medical History:   Diagnosis Date    Personal history of other diseases of the circulatory system 10/20/2022    History of aortic valve stenosis       Medication review  Medication Documentation Review Audit       Reviewed by JONATHON Nassar (Nurse Practitioner) on 11/11/24 at 0856      Medication Order Taking? Sig Documenting Provider Last Dose Status   amLODIPine (Norvasc) 10 mg tablet [id number] Yes Take 1 tablet (10 mg) by mouth once daily. Arnoldo Mota MD  Active   ASCORBIC ACID, VITAMIN C, ORAL [id number] Yes Take 282 mg by mouth once daily. Historical Provider, MD  Active   aspirin 81 mg EC tablet [id number] Yes Take 1 tablet (81 mg) by mouth 3 times a week. Historical Provider, MD  Active   calcium carbonate EX (Tums Extra Strength) 300 mg (750 mg) chewable tablet [id number] Yes Chew 600 mg as needed at bedtime for indigestion or heartburn. Historical Provider, MD  Active   carvedilol (Coreg) 25 mg tablet [id number] Yes TAKE 1 TABLET BY MOUTH  TWICE DAILY Antoine Harrison MD  Active   cholecalciferol (Vitamin D-3) 125 MCG (5000 UT) capsule [id number] Yes Take 1 capsule (125 mcg) by mouth once daily. Historical Provider, MD  Active   ezetimibe (Zetia) 10 mg tablet [id number] Yes Take 1 tablet (10 mg) by mouth once daily. Antoine Harrison MD  Active   famotidine (Pepcid) 20 mg tablet  [id number] Yes Take 1 tablet (20 mg) by mouth once daily as needed for heartburn. Historical Provider, MD  Active   methylPREDNISolone (Medrol Dospak) 4 mg tablets [id number]  Follow schedule on package instructions Maryse Gale MD  Active   multivitamin tablet [id number]  Take 1 tablet by mouth 3 times a week. Historical Provider, MD  Active   nitroglycerin (Nitrostat) 0.4 mg SL tablet [id number] Yes Place 1 tablet (0.4 mg) under the tongue every 5 minutes if needed for chest pain. DISSOLVE 1 TABLET UNDER THE TONGUE AS NEEDED FOR CHEST PAIN- MAY REPEAT EVERY 5 MINUTES IF NEEDED (MAX 3 DOSES.- IF NO RELIEF CALL 911) Historical Provider, MD  Active   omega 3-dha-epa-fish oil (Fish OiL) 1,200 (144-216) mg capsule [id number] Yes Take 1 capsule (1,200 mg) by mouth once daily. Historical Provider, MD  Active   omeprazole OTC (PriLOSEC OTC) 20 mg EC tablet [id number] Yes Take 1 tablet (20 mg) by mouth once daily as needed (acid reflux). Do not crush, chew, or split. Historical Provider, MD  Active   vitamin B complex tablet [id number] Yes Take 1 tablet by mouth once daily. Historical Provider, MD  Active                    Allergies  Allergies   Allergen Reactions    Ezetimibe Myalgia    Lisinopril Cough    Losartan Potassium Dizziness     Patient had vertigo    Morphine Nausea/vomiting    Statins-Hmg-Coa Reductase Inhibitors Myalgia     Leg, thighs cramps  Hx of pravastatin, simvastatin       Social History  Social History     Socioeconomic History    Marital status:      Spouse name: Not on file    Number of children: Not on file    Years of education: Not on file    Highest education level: Not on file   Occupational History    Not on file   Tobacco Use    Smoking status: Former     Current packs/day: 0.00     Average packs/day: 1.5 packs/day for 24.0 years (36.0 ttl pk-yrs)     Types: Cigarettes     Start date:      Quit date: 2000     Years since quittin.9    Smokeless tobacco: Never   Substance and  Sexual Activity    Alcohol use: Not Currently     Alcohol/week: 4.0 standard drinks of alcohol     Types: 4 Glasses of wine per week    Drug use: Never    Sexual activity: Defer   Other Topics Concern    Not on file   Social History Narrative    Not on file     Social Drivers of Health     Financial Resource Strain: Low Risk  (2/27/2024)    Overall Financial Resource Strain (CARDIA)     Difficulty of Paying Living Expenses: Not hard at all   Food Insecurity: No Food Insecurity (5/20/2021)    [facility] [place] [facility]    Hunger Vital Sign     Worried About Running Out of Food in the Last Year: Never true     Ran Out of Food in the Last Year: Never true   Transportation Needs: No Transportation Needs (2/27/2024)    PRAPARE - Transportation     Lack of Transportation (Medical): No     Lack of Transportation (Non-Medical): No   Physical Activity: Not on file   Stress: Not on file   Social Connections: Not on file   Intimate Partner Violence: Not on file   Housing Stability: Unknown (2/27/2024)    Housing Stability Vital Sign     Unable to Pay for Housing in the Last Year: No     Number of Places Lived in the Last Year: Not on file     Unstable Housing in the Last Year: No       Surgical History  Past Surgical History:   Procedure Laterality Date    OTHER SURGICAL HISTORY  10/27/2020    Cholecystectomy    OTHER SURGICAL HISTORY  10/27/2020    Cervical laminectomy    OTHER SURGICAL HISTORY  10/27/2020    Colonoscopy    OTHER SURGICAL HISTORY  04/13/2022    Percutaneous transluminal coronary angioplasty    OTHER SURGICAL HISTORY  04/13/2022    Cardiac catheterization with stent placement    OTHER SURGICAL HISTORY  04/13/2022    Tonsillectomy with adenoidectomy       Physical Exam:  GENERAL:  Patient is awake, alert, and oriented to person place and time.  Patient appears well nourished and well kept.  Affect Calm, Not Acutely Distressed.  HEENT:  Normocephalic,  Atraumatic, EOMI  CARDIOVASCULAR:  Hemodynamically stable.  RESPIRATORY:  Normal respirations with unlabored breathing.  Extremity: Left hip examination shows skin is intact.  He can flex to left hip at 90 degrees.  There is no pain with internal or external rotation.  Mild pain over the left trochanteric bursa.  Quadricep strength is 5/ 5 in the left.     Right hip shows skin is intact.  He can flex the right hip to 90 degrees.  There is no pain with internal or external rotation.  Pain over the right trochanteric bursa.  Quadricep strength is 5/5 on the right.  He is able to get up from his chair with no difficulties.  He is walking with no significant antalgic gait.  Distal pulses are palpable.      Diagnostics: Non today  XR hips bilateral 3 or 4 VW w pelvis when performed  Interpreted By:  Maryse Chanel,   STUDY:  XR HIPS BILATERAL 3 OR 4 VW WITH PELVIS WHEN PERFORMED, 10/30/2024  9:34 am      INDICATION:  Signs/Symptoms:pain      ACCESSION NUMBER(S):  [id number]      ORDERING CLINICIAN:  MARYSE CHANEL      FINDINGS:  Bilateral hip x-rays two views AP and lateral view with AP pelvic: No  acute fractures, no dislocation. Mild degenerative changes of both  the right and left hip. Degenerative changes of the lumbar spine.          Signed by: Maryse Chanel 10/30/2024 10:47 AM  Dictation workstation:   [id number]        Procedure: None    Assessment:   Bilateral hip osteoarthritis  Bilateral trochanteric bursitis right more than the left  Lumbar DDD    Plan: Emiliano presents today for reevaluation for bilateral osteoarthritis and bilateral trochanteric bursitis.  He got minimal relief with the oral steroids.  He did not start physical therapy, we emphasized importance of starting physical therapy for his trochanteric bursitis.  We did offer a corticosteroid injection to the trochanteric bursa, however he declined at this time, would like to try physical therapy first.  He will call the office if he wants to  go forward with any further treatment options such as trochanteric bursa corticosteroid injection.  May consider further advanced imaging.      No orders of the defined types were placed in this encounter.     At the conclusion of the visit there were no further questions by the patient/family regarding their plan of care.  Patient was instructed to call or return with any issues, questions, or concerns regarding their injury and/or treatment plan described above.     11/26/24 at 1:27 PM - Maryse Gale MD  Scribe Attestation  By signing my name below, I, Leeroy Bethanie, Scribe   attest that this documentation has been prepared under the direction and in the presence of Maryse Gale MD.    Office: [phone]    This note was prepared using voice recognition software.  The details of this note are correct and have been reviewed, and corrected to the best of my ability.  Some grammatical errors may persist related to the Dragon software.

## 2025-01-13 ENCOUNTER — OFFICE VISIT (OUTPATIENT)
Dept: ORTHOPEDIC SURGERY | Facility: CLINIC | Age: 65
End: 2025-01-13
Payer: MEDICARE

## 2025-01-13 DIAGNOSIS — M19.012 PRIMARY OSTEOARTHRITIS OF LEFT SHOULDER: ICD-10-CM

## 2025-01-13 PROCEDURE — 1157F ADVNC CARE PLAN IN RCRD: CPT | Performed by: ORTHOPAEDIC SURGERY

## 2025-01-13 PROCEDURE — 1123F ACP DISCUSS/DSCN MKR DOCD: CPT | Performed by: ORTHOPAEDIC SURGERY

## 2025-01-13 PROCEDURE — 99213 OFFICE O/P EST LOW 20 MIN: CPT | Performed by: ORTHOPAEDIC SURGERY

## 2025-01-13 NOTE — PROGRESS NOTES
History of present illness: History of left shoulder moderate arthritic change patient did extremely well with a fluoroscopy guided injection for 5 months    Physical exam:    General: No acute distress or breathing difficulty or discomfort, pleasant and cooperative with the examination.    Extremities: The left shoulder was inspected and was found to have no obvious deformity.  There was tenderness to touch over the lateral edges of the shoulder over the rotator cuff insertion.  Active forward flexion 110 degrees, external rotation to 50 degrees, abduction to 45 degrees, and internal rotation to the level of L2.    At this time the shoulder is neurovascular tact and neurosensory intact.  Motor intact C5-T1.  There was no obvious neck pain or radiculopathy noted.  There was no gross instability or adhesive capsulitis symptoms.  There was no evidence of apprehension or apprehension suppression.    Strength was tested in 4 planes with weakness in the supraspinatus strength testing and external rotation position.  There was no strength deficit in internal rotation.  Impingement signs were positive both supine and standing for impingement test type I and II.  There was mild pain over the bicipital groove with a positive speeds sign    Diagnostic studies: We reviewed x-rays which did not look too bad they were done back in June 2024 MRI done in July 2024 showed moderate arthritic change    Impression: Moderate osteoarthritic change left shoulder    Plan: Patient does fluoroscopy guided injections injection today will be ordered at [facility]    I will see him back as needed    When he does return he will be due for new x-rays AP axillary view of the left shoulder

## 2025-01-17 ENCOUNTER — OFFICE VISIT (OUTPATIENT)
Dept: URGENT CARE | Age: 65
End: 2025-01-17
Payer: MEDICARE

## 2025-01-17 VITALS
HEIGHT: 68 IN | RESPIRATION RATE: 20 BRPM | SYSTOLIC BLOOD PRESSURE: 123 MMHG | WEIGHT: 200 LBS | BODY MASS INDEX: 30.31 KG/M2 | DIASTOLIC BLOOD PRESSURE: 80 MMHG | OXYGEN SATURATION: 98 % | TEMPERATURE: 98.1 F | HEART RATE: 79 BPM

## 2025-01-17 DIAGNOSIS — U07.1 COVID-19: Primary | ICD-10-CM

## 2025-01-17 LAB — POC SARS-COV-2 AG BINAX: ABNORMAL

## 2025-01-17 RX ORDER — METHYLPREDNISOLONE 4 MG/1
TABLET ORAL
Qty: 21 TABLET | Refills: 0 | Status: SHIPPED | OUTPATIENT
Start: 2025-01-17

## 2025-01-17 ASSESSMENT — ENCOUNTER SYMPTOMS
WHEEZING: 1
SINUS PAIN: 1
SINUS PRESSURE: 1
FEVER: 0
CHILLS: 0
COUGH: 1
RHINORRHEA: 1
SORE THROAT: 0
FATIGUE: 1
HEADACHES: 1
SHORTNESS OF BREATH: 1

## 2025-01-17 NOTE — PROGRESS NOTES
"Subjective   Patient ID: Emiliano Mann is a 64 y.o. male who presents for Cough (X 1wk, dry cough, cp when coughing ), Nasal Congestion, Fatigue, and Headache.    Cough  Associated symptoms include headaches, postnasal drip, rhinorrhea, shortness of breath and wheezing. Pertinent negatives include no chills, ear pain, fever or sore throat.   Fatigue  Associated symptoms: congestion, cough, fatigue, headaches, rhinorrhea, shortness of breath and wheezing    Associated symptoms: no ear pain, no fever and no sore throat    Headache  Associated symptoms: congestion, cough, drainage, fatigue and sinus pressure    Associated symptoms: no ear pain, no fever and no sore throat        Patient presents for possible COVID. Reporting coughing, chest aching with coughing, congestion, fatigue, and headache. Denies fever/chills. Reporting some shortness of breath and wheezing, he has a history of emphysema. He has tried OTC meds with mild relief.     Review of Systems   Constitutional:  Positive for fatigue. Negative for chills and fever.   HENT:  Positive for congestion, postnasal drip, rhinorrhea, sinus pressure and sinus pain. Negative for ear pain and sore throat.    Respiratory:  Positive for cough, shortness of breath and wheezing.    Neurological:  Positive for headaches.       Objective     /80   Pulse 79   Temp 36.7 °C (98.1 °F)   Resp 20   Ht 1.727 m (5' 8\")   Wt 90.7 kg (200 lb)   SpO2 98%   BMI 30.41 kg/m²     Physical Exam  Constitutional:       General: He is not in acute distress.     Appearance: Normal appearance.   HENT:      Head: Normocephalic and atraumatic.      Right Ear: No middle ear effusion. Tympanic membrane is not erythematous or bulging.      Left Ear:  No middle ear effusion. Tympanic membrane is not erythematous or bulging.      Nose:      Right Sinus: No maxillary sinus tenderness or frontal sinus tenderness.      Left Sinus: No maxillary sinus tenderness or frontal sinus tenderness. "      Mouth/Throat:      Pharynx: Postnasal drip present. No pharyngeal swelling, oropharyngeal exudate or posterior oropharyngeal erythema.   Eyes:      General: Lids are normal.   Cardiovascular:      Rate and Rhythm: Normal rate and regular rhythm.      Heart sounds: Normal heart sounds.   Pulmonary:      Effort: Pulmonary effort is normal. No respiratory distress.      Breath sounds: Normal breath sounds.   Skin:     General: Skin is warm and dry.   Neurological:      General: No focal deficit present.      Mental Status: He is alert and oriented to person, place, and time.   Psychiatric:         Attention and Perception: Attention normal.         Mood and Affect: Mood normal.         Speech: Speech normal.         Behavior: Behavior normal.         Thought Content: Thought content normal.         Assessment/Plan     Problem List Items Addressed This Visit    None  Visit Diagnoses       COVID-19    -  Primary    Relevant Medications    methylPREDNISolone (Medrol Dospak) 4 mg tablets    Other Relevant Orders    POCT Covid-19 Rapid Antigen (Completed)            Final diagnoses:   [U07.1] COVID-19      Positive for COVID, discussed Paxlovid, symptoms present for week, VSS, appears well clinically. Sending Medrol.     At time of discharge patient was clinically well-appearing and HDS for outpatient management. Patient was educated regarding diagnosis, supportive care, OTC and Rx medications. He was given the opportunity to ask questions prior to discharge.  They verbalized understanding of my discussion of the plans for treatment, expected course, indications to return to  or seek further evaluation in ED, and the need for timely follow up as directed.     Patient disposition: home

## 2025-01-31 ENCOUNTER — HOSPITAL ENCOUNTER (OUTPATIENT)
Dept: RADIOLOGY | Facility: HOSPITAL | Age: 65
Discharge: HOME | End: 2025-01-31
Payer: MEDICARE

## 2025-01-31 DIAGNOSIS — M19.012 PRIMARY OSTEOARTHRITIS OF LEFT SHOULDER: ICD-10-CM

## 2025-01-31 PROCEDURE — 96372 THER/PROPH/DIAG INJ SC/IM: CPT | Performed by: RADIOLOGY

## 2025-01-31 PROCEDURE — 2500000004 HC RX 250 GENERAL PHARMACY W/ HCPCS (ALT 636 FOR OP/ED): Mod: JZ | Performed by: RADIOLOGY

## 2025-01-31 PROCEDURE — 2550000001 HC RX 255 CONTRASTS: Performed by: ORTHOPAEDIC SURGERY

## 2025-01-31 PROCEDURE — 77002 NEEDLE LOCALIZATION BY XRAY: CPT | Mod: LT

## 2025-01-31 RX ORDER — METHYLPREDNISOLONE ACETATE 40 MG/ML
40 INJECTION, SUSPENSION INTRA-ARTICULAR; INTRALESIONAL; INTRAMUSCULAR; SOFT TISSUE ONCE
Status: COMPLETED | OUTPATIENT
Start: 2025-01-31 | End: 2025-01-31

## 2025-01-31 RX ORDER — BUPIVACAINE HYDROCHLORIDE 5 MG/ML
3 INJECTION, SOLUTION EPIDURAL; INTRACAUDAL ONCE
Status: COMPLETED | OUTPATIENT
Start: 2025-01-31 | End: 2025-01-31

## 2025-01-31 RX ORDER — LIDOCAINE HYDROCHLORIDE 20 MG/ML
20 INJECTION, SOLUTION INFILTRATION; PERINEURAL ONCE
Status: COMPLETED | OUTPATIENT
Start: 2025-01-31 | End: 2025-01-31

## 2025-01-31 RX ADMIN — BUPIVACAINE HYDROCHLORIDE 3 ML: 5 INJECTION, SOLUTION EPIDURAL; INTRACAUDAL; PERINEURAL at 08:48

## 2025-01-31 RX ADMIN — IOHEXOL 1 ML: 300 INJECTION, SOLUTION INTRAVENOUS at 08:48

## 2025-01-31 RX ADMIN — METHYLPREDNISOLONE ACETATE 40 MG: 40 INJECTION, SUSPENSION INTRA-ARTICULAR; INTRALESIONAL; INTRAMUSCULAR; INTRASYNOVIAL; SOFT TISSUE at 08:48

## 2025-01-31 RX ADMIN — LIDOCAINE HYDROCHLORIDE 10 ML: 20 INJECTION, SOLUTION INFILTRATION; PERINEURAL at 08:47

## 2025-03-10 ENCOUNTER — APPOINTMENT (OUTPATIENT)
Dept: CARDIOLOGY | Facility: HOSPITAL | Age: 65
DRG: 322 | End: 2025-03-10
Payer: MEDICARE

## 2025-03-10 ENCOUNTER — HOSPITAL ENCOUNTER (INPATIENT)
Facility: HOSPITAL | Age: 65
LOS: 1 days | Discharge: HOME | DRG: 322 | End: 2025-03-11
Attending: EMERGENCY MEDICINE | Admitting: STUDENT IN AN ORGANIZED HEALTH CARE EDUCATION/TRAINING PROGRAM
Payer: MEDICARE

## 2025-03-10 ENCOUNTER — APPOINTMENT (OUTPATIENT)
Dept: RADIOLOGY | Facility: HOSPITAL | Age: 65
DRG: 322 | End: 2025-03-10
Payer: MEDICARE

## 2025-03-10 DIAGNOSIS — I21.4 NSTEMI (NON-ST ELEVATED MYOCARDIAL INFARCTION) (MULTI): ICD-10-CM

## 2025-03-10 DIAGNOSIS — I25.10 CAD S/P PERCUTANEOUS CORONARY ANGIOPLASTY: ICD-10-CM

## 2025-03-10 DIAGNOSIS — Z87.891 FORMER SMOKER: ICD-10-CM

## 2025-03-10 DIAGNOSIS — Z98.61 CAD S/P PERCUTANEOUS CORONARY ANGIOPLASTY: ICD-10-CM

## 2025-03-10 DIAGNOSIS — I10 HTN (HYPERTENSION), BENIGN: ICD-10-CM

## 2025-03-10 DIAGNOSIS — R07.9 CHEST PAIN, UNSPECIFIED TYPE: Primary | ICD-10-CM

## 2025-03-10 LAB
ACT BLD: NORMAL S
ALBUMIN SERPL BCP-MCNC: 4.3 G/DL (ref 3.4–5)
ALP SERPL-CCNC: 53 U/L (ref 33–136)
ALT SERPL W P-5'-P-CCNC: 18 U/L (ref 10–52)
ANION GAP SERPL CALC-SCNC: 13 MMOL/L (ref 10–20)
APTT PPP: 31 SECONDS (ref 26–36)
AST SERPL W P-5'-P-CCNC: 16 U/L (ref 9–39)
ATRIAL RATE: 56 BPM
ATRIAL RATE: 64 BPM
BASOPHILS # BLD AUTO: 0.04 X10*3/UL (ref 0–0.1)
BASOPHILS NFR BLD AUTO: 0.4 %
BILIRUB SERPL-MCNC: 0.6 MG/DL (ref 0–1.2)
BNP SERPL-MCNC: 72 PG/ML (ref 0–99)
BUN SERPL-MCNC: 14 MG/DL (ref 6–23)
CALCIUM SERPL-MCNC: 9.2 MG/DL (ref 8.6–10.3)
CARDIAC TROPONIN I PNL SERPL HS: 423 NG/L (ref 0–20)
CARDIAC TROPONIN I PNL SERPL HS: 439 NG/L (ref 0–20)
CHLORIDE SERPL-SCNC: 104 MMOL/L (ref 98–107)
CHOLEST SERPL-MCNC: 270 MG/DL (ref 0–199)
CHOLESTEROL/HDL RATIO: 6
CO2 SERPL-SCNC: 24 MMOL/L (ref 21–32)
CREAT SERPL-MCNC: 1.04 MG/DL (ref 0.5–1.3)
EGFRCR SERPLBLD CKD-EPI 2021: 79 ML/MIN/1.73M*2
EOSINOPHIL # BLD AUTO: 0.2 X10*3/UL (ref 0–0.7)
EOSINOPHIL NFR BLD AUTO: 2.2 %
ERYTHROCYTE [DISTWIDTH] IN BLOOD BY AUTOMATED COUNT: 11.9 % (ref 11.5–14.5)
GLUCOSE SERPL-MCNC: 98 MG/DL (ref 74–99)
HCT VFR BLD AUTO: 44.5 % (ref 41–52)
HDLC SERPL-MCNC: 45.2 MG/DL
HGB BLD-MCNC: 15.7 G/DL (ref 13.5–17.5)
IMM GRANULOCYTES # BLD AUTO: 0.05 X10*3/UL (ref 0–0.7)
IMM GRANULOCYTES NFR BLD AUTO: 0.5 % (ref 0–0.9)
INR PPP: 1 (ref 0.9–1.1)
LACTATE SERPL-SCNC: 1.8 MMOL/L (ref 0.4–2)
LDLC SERPL CALC-MCNC: 174 MG/DL
LYMPHOCYTES # BLD AUTO: 2.51 X10*3/UL (ref 1.2–4.8)
LYMPHOCYTES NFR BLD AUTO: 27.2 %
MAGNESIUM SERPL-MCNC: 2.03 MG/DL (ref 1.6–2.4)
MCH RBC QN AUTO: 31.3 PG (ref 26–34)
MCHC RBC AUTO-ENTMCNC: 35.3 G/DL (ref 32–36)
MCV RBC AUTO: 89 FL (ref 80–100)
MONOCYTES # BLD AUTO: 0.94 X10*3/UL (ref 0.1–1)
MONOCYTES NFR BLD AUTO: 10.2 %
NEUTROPHILS # BLD AUTO: 5.49 X10*3/UL (ref 1.2–7.7)
NEUTROPHILS NFR BLD AUTO: 59.5 %
NON HDL CHOLESTEROL: 225 MG/DL (ref 0–149)
NRBC BLD-RTO: 0 /100 WBCS (ref 0–0)
P AXIS: 63 DEGREES
P AXIS: 71 DEGREES
P OFFSET: 174 MS
P OFFSET: 178 MS
P ONSET: 116 MS
P ONSET: 120 MS
PLATELET # BLD AUTO: 254 X10*3/UL (ref 150–450)
POTASSIUM SERPL-SCNC: 3.8 MMOL/L (ref 3.5–5.3)
PR INTERVAL: 180 MS
PR INTERVAL: 188 MS
PROT SERPL-MCNC: 7 G/DL (ref 6.4–8.2)
PROTHROMBIN TIME: 10.6 SECONDS (ref 9.8–12.4)
Q ONSET: 210 MS
Q ONSET: 210 MS
QRS COUNT: 11 BEATS
QRS COUNT: 9 BEATS
QRS DURATION: 102 MS
QRS DURATION: 112 MS
QT INTERVAL: 404 MS
QT INTERVAL: 440 MS
QTC CALCULATION(BAZETT): 416 MS
QTC CALCULATION(BAZETT): 424 MS
QTC FREDERICIA: 412 MS
QTC FREDERICIA: 430 MS
R AXIS: 27 DEGREES
R AXIS: 32 DEGREES
RBC # BLD AUTO: 5.02 X10*6/UL (ref 4.5–5.9)
SODIUM SERPL-SCNC: 137 MMOL/L (ref 136–145)
T AXIS: -1 DEGREES
T AXIS: 3 DEGREES
T OFFSET: 412 MS
T OFFSET: 430 MS
TRIGL SERPL-MCNC: 255 MG/DL (ref 0–149)
VENTRICULAR RATE: 56 BPM
VENTRICULAR RATE: 64 BPM
VLDL: 51 MG/DL (ref 0–40)
WBC # BLD AUTO: 9.2 X10*3/UL (ref 4.4–11.3)

## 2025-03-10 PROCEDURE — C1874 STENT, COATED/COV W/DEL SYS: HCPCS | Performed by: INTERNAL MEDICINE

## 2025-03-10 PROCEDURE — 84484 ASSAY OF TROPONIN QUANT: CPT | Performed by: EMERGENCY MEDICINE

## 2025-03-10 PROCEDURE — 2500000001 HC RX 250 WO HCPCS SELF ADMINISTERED DRUGS (ALT 637 FOR MEDICARE OP): Performed by: INTERNAL MEDICINE

## 2025-03-10 PROCEDURE — 83880 ASSAY OF NATRIURETIC PEPTIDE: CPT | Performed by: EMERGENCY MEDICINE

## 2025-03-10 PROCEDURE — 36415 COLL VENOUS BLD VENIPUNCTURE: CPT | Performed by: EMERGENCY MEDICINE

## 2025-03-10 PROCEDURE — 93454 CORONARY ARTERY ANGIO S&I: CPT | Performed by: INTERNAL MEDICINE

## 2025-03-10 PROCEDURE — G0269 OCCLUSIVE DEVICE IN VEIN ART: HCPCS | Mod: 59 | Performed by: INTERNAL MEDICINE

## 2025-03-10 PROCEDURE — C1887 CATHETER, GUIDING: HCPCS | Performed by: INTERNAL MEDICINE

## 2025-03-10 PROCEDURE — 99153 MOD SED SAME PHYS/QHP EA: CPT | Performed by: INTERNAL MEDICINE

## 2025-03-10 PROCEDURE — 7100000010 HC PHASE TWO TIME - EACH INCREMENTAL 1 MINUTE: Performed by: INTERNAL MEDICINE

## 2025-03-10 PROCEDURE — 2500000004 HC RX 250 GENERAL PHARMACY W/ HCPCS (ALT 636 FOR OP/ED): Performed by: INTERNAL MEDICINE

## 2025-03-10 PROCEDURE — 2500000001 HC RX 250 WO HCPCS SELF ADMINISTERED DRUGS (ALT 637 FOR MEDICARE OP): Performed by: STUDENT IN AN ORGANIZED HEALTH CARE EDUCATION/TRAINING PROGRAM

## 2025-03-10 PROCEDURE — 93306 TTE W/DOPPLER COMPLETE: CPT | Performed by: INTERNAL MEDICINE

## 2025-03-10 PROCEDURE — 2500000005 HC RX 250 GENERAL PHARMACY W/O HCPCS: Performed by: EMERGENCY MEDICINE

## 2025-03-10 PROCEDURE — 96374 THER/PROPH/DIAG INJ IV PUSH: CPT | Mod: 59

## 2025-03-10 PROCEDURE — B2111ZZ FLUOROSCOPY OF MULTIPLE CORONARY ARTERIES USING LOW OSMOLAR CONTRAST: ICD-10-PCS | Performed by: INTERNAL MEDICINE

## 2025-03-10 PROCEDURE — C1725 CATH, TRANSLUMIN NON-LASER: HCPCS | Performed by: INTERNAL MEDICINE

## 2025-03-10 PROCEDURE — 2500000001 HC RX 250 WO HCPCS SELF ADMINISTERED DRUGS (ALT 637 FOR MEDICARE OP): Performed by: NURSE PRACTITIONER

## 2025-03-10 PROCEDURE — 2780000003 HC OR 278 NO HCPCS: Performed by: INTERNAL MEDICINE

## 2025-03-10 PROCEDURE — C1769 GUIDE WIRE: HCPCS | Performed by: INTERNAL MEDICINE

## 2025-03-10 PROCEDURE — 71045 X-RAY EXAM CHEST 1 VIEW: CPT

## 2025-03-10 PROCEDURE — 2550000001 HC RX 255 CONTRASTS: Performed by: INTERNAL MEDICINE

## 2025-03-10 PROCEDURE — 99291 CRITICAL CARE FIRST HOUR: CPT | Mod: 25 | Performed by: EMERGENCY MEDICINE

## 2025-03-10 PROCEDURE — 4A023N7 MEASUREMENT OF CARDIAC SAMPLING AND PRESSURE, LEFT HEART, PERCUTANEOUS APPROACH: ICD-10-PCS | Performed by: INTERNAL MEDICINE

## 2025-03-10 PROCEDURE — 85025 COMPLETE CBC W/AUTO DIFF WBC: CPT | Performed by: EMERGENCY MEDICINE

## 2025-03-10 PROCEDURE — 7100000009 HC PHASE TWO TIME - INITIAL BASE CHARGE: Performed by: INTERNAL MEDICINE

## 2025-03-10 PROCEDURE — C9600 PERC DRUG-EL COR STENT SING: HCPCS | Mod: RC | Performed by: INTERNAL MEDICINE

## 2025-03-10 PROCEDURE — 2500000004 HC RX 250 GENERAL PHARMACY W/ HCPCS (ALT 636 FOR OP/ED): Performed by: EMERGENCY MEDICINE

## 2025-03-10 PROCEDURE — 71045 X-RAY EXAM CHEST 1 VIEW: CPT | Performed by: RADIOLOGY

## 2025-03-10 PROCEDURE — 83605 ASSAY OF LACTIC ACID: CPT | Performed by: EMERGENCY MEDICINE

## 2025-03-10 PROCEDURE — 027035Z DILATION OF CORONARY ARTERY, ONE ARTERY WITH TWO DRUG-ELUTING INTRALUMINAL DEVICES, PERCUTANEOUS APPROACH: ICD-10-PCS | Performed by: INTERNAL MEDICINE

## 2025-03-10 PROCEDURE — 7100000001 HC RECOVERY ROOM TIME - INITIAL BASE CHARGE: Performed by: INTERNAL MEDICINE

## 2025-03-10 PROCEDURE — 92928 PRQ TCAT PLMT NTRAC ST 1 LES: CPT | Performed by: INTERNAL MEDICINE

## 2025-03-10 PROCEDURE — 93306 TTE W/DOPPLER COMPLETE: CPT

## 2025-03-10 PROCEDURE — 85730 THROMBOPLASTIN TIME PARTIAL: CPT | Performed by: EMERGENCY MEDICINE

## 2025-03-10 PROCEDURE — 80053 COMPREHEN METABOLIC PANEL: CPT | Performed by: EMERGENCY MEDICINE

## 2025-03-10 PROCEDURE — 93005 ELECTROCARDIOGRAM TRACING: CPT

## 2025-03-10 PROCEDURE — 99223 1ST HOSP IP/OBS HIGH 75: CPT | Performed by: STUDENT IN AN ORGANIZED HEALTH CARE EDUCATION/TRAINING PROGRAM

## 2025-03-10 PROCEDURE — C1760 CLOSURE DEV, VASC: HCPCS | Performed by: INTERNAL MEDICINE

## 2025-03-10 PROCEDURE — 80061 LIPID PANEL: CPT | Performed by: NURSE PRACTITIONER

## 2025-03-10 PROCEDURE — 85347 COAGULATION TIME ACTIVATED: CPT | Performed by: INTERNAL MEDICINE

## 2025-03-10 PROCEDURE — 93454 CORONARY ARTERY ANGIO S&I: CPT | Mod: 59 | Performed by: INTERNAL MEDICINE

## 2025-03-10 PROCEDURE — 83735 ASSAY OF MAGNESIUM: CPT | Performed by: EMERGENCY MEDICINE

## 2025-03-10 PROCEDURE — 99152 MOD SED SAME PHYS/QHP 5/>YRS: CPT | Performed by: INTERNAL MEDICINE

## 2025-03-10 PROCEDURE — 99024 POSTOP FOLLOW-UP VISIT: CPT | Performed by: NURSE PRACTITIONER

## 2025-03-10 PROCEDURE — 85347 COAGULATION TIME ACTIVATED: CPT

## 2025-03-10 PROCEDURE — 2060000001 HC INTERMEDIATE ICU ROOM DAILY

## 2025-03-10 PROCEDURE — 2720000007 HC OR 272 NO HCPCS: Performed by: INTERNAL MEDICINE

## 2025-03-10 PROCEDURE — 2500000001 HC RX 250 WO HCPCS SELF ADMINISTERED DRUGS (ALT 637 FOR MEDICARE OP): Performed by: EMERGENCY MEDICINE

## 2025-03-10 PROCEDURE — 85610 PROTHROMBIN TIME: CPT | Performed by: EMERGENCY MEDICINE

## 2025-03-10 PROCEDURE — 99223 1ST HOSP IP/OBS HIGH 75: CPT | Performed by: NURSE PRACTITIONER

## 2025-03-10 PROCEDURE — 7100000002 HC RECOVERY ROOM TIME - EACH INCREMENTAL 1 MINUTE: Performed by: INTERNAL MEDICINE

## 2025-03-10 DEVICE — STENT ONYXNG35008UX ONYX 3.50X08RX
Type: IMPLANTABLE DEVICE | Site: CORONARY | Status: FUNCTIONAL
Brand: ONYX FRONTIER™

## 2025-03-10 DEVICE — STENT ONYXNG35022UX ONYX 3.50X22RX
Type: IMPLANTABLE DEVICE | Site: CORONARY | Status: FUNCTIONAL
Brand: ONYX FRONTIER™

## 2025-03-10 RX ORDER — NITROGLYCERIN 5 MG/ML
INJECTION, SOLUTION INTRAVENOUS AS NEEDED
Status: DISCONTINUED | OUTPATIENT
Start: 2025-03-10 | End: 2025-03-10 | Stop reason: HOSPADM

## 2025-03-10 RX ORDER — FAMOTIDINE 20 MG/1
20 TABLET, FILM COATED ORAL DAILY PRN
Status: DISCONTINUED | OUTPATIENT
Start: 2025-03-10 | End: 2025-03-11 | Stop reason: HOSPADM

## 2025-03-10 RX ORDER — NITROGLYCERIN 0.4 MG/1
0.4 TABLET SUBLINGUAL ONCE
Status: COMPLETED | OUTPATIENT
Start: 2025-03-10 | End: 2025-03-10

## 2025-03-10 RX ORDER — ASPIRIN 81 MG/1
81 TABLET ORAL 3 TIMES WEEKLY
Status: DISCONTINUED | OUTPATIENT
Start: 2025-03-12 | End: 2025-03-10

## 2025-03-10 RX ORDER — HEPARIN SODIUM 1000 [USP'U]/ML
INJECTION, SOLUTION INTRAVENOUS; SUBCUTANEOUS AS NEEDED
Status: DISCONTINUED | OUTPATIENT
Start: 2025-03-10 | End: 2025-03-10 | Stop reason: HOSPADM

## 2025-03-10 RX ORDER — AMLODIPINE BESYLATE 5 MG/1
10 TABLET ORAL DAILY
Status: DISCONTINUED | OUTPATIENT
Start: 2025-03-10 | End: 2025-03-11 | Stop reason: HOSPADM

## 2025-03-10 RX ORDER — LIDOCAINE HYDROCHLORIDE 20 MG/ML
INJECTION, SOLUTION INFILTRATION; PERINEURAL AS NEEDED
Status: DISCONTINUED | OUTPATIENT
Start: 2025-03-10 | End: 2025-03-10 | Stop reason: HOSPADM

## 2025-03-10 RX ORDER — NITROGLYCERIN 0.4 MG/1
0.4 TABLET SUBLINGUAL EVERY 5 MIN PRN
Status: DISCONTINUED | OUTPATIENT
Start: 2025-03-10 | End: 2025-03-10

## 2025-03-10 RX ORDER — CARVEDILOL 12.5 MG/1
25 TABLET ORAL 2 TIMES DAILY
Status: DISCONTINUED | OUTPATIENT
Start: 2025-03-10 | End: 2025-03-10

## 2025-03-10 RX ORDER — PANTOPRAZOLE SODIUM 40 MG/1
40 TABLET, DELAYED RELEASE ORAL
Status: DISCONTINUED | OUTPATIENT
Start: 2025-03-11 | End: 2025-03-11 | Stop reason: HOSPADM

## 2025-03-10 RX ORDER — CARVEDILOL 12.5 MG/1
25 TABLET ORAL 2 TIMES DAILY
Status: DISCONTINUED | OUTPATIENT
Start: 2025-03-10 | End: 2025-03-11 | Stop reason: HOSPADM

## 2025-03-10 RX ORDER — NAPROXEN SODIUM 220 MG/1
81 TABLET, FILM COATED ORAL DAILY
Start: 2025-03-11

## 2025-03-10 RX ORDER — ONDANSETRON HYDROCHLORIDE 2 MG/ML
INJECTION, SOLUTION INTRAVENOUS AS NEEDED
Status: DISCONTINUED | OUTPATIENT
Start: 2025-03-10 | End: 2025-03-10 | Stop reason: HOSPADM

## 2025-03-10 RX ORDER — FENTANYL CITRATE 50 UG/ML
INJECTION, SOLUTION INTRAMUSCULAR; INTRAVENOUS AS NEEDED
Status: DISCONTINUED | OUTPATIENT
Start: 2025-03-10 | End: 2025-03-10 | Stop reason: HOSPADM

## 2025-03-10 RX ORDER — ACETAMINOPHEN 160 MG/5ML
650 SOLUTION ORAL EVERY 4 HOURS PRN
Status: DISCONTINUED | OUTPATIENT
Start: 2025-03-10 | End: 2025-03-11 | Stop reason: HOSPADM

## 2025-03-10 RX ORDER — NITROGLYCERIN 0.4 MG/1
0.4 TABLET SUBLINGUAL EVERY 5 MIN PRN
Status: DISCONTINUED | OUTPATIENT
Start: 2025-03-10 | End: 2025-03-11 | Stop reason: HOSPADM

## 2025-03-10 RX ORDER — ALUMINUM HYDROXIDE, MAGNESIUM HYDROXIDE, AND SIMETHICONE 1200; 120; 1200 MG/30ML; MG/30ML; MG/30ML
30 SUSPENSION ORAL 4 TIMES DAILY PRN
Status: DISCONTINUED | OUTPATIENT
Start: 2025-03-10 | End: 2025-03-11 | Stop reason: HOSPADM

## 2025-03-10 RX ORDER — NAPROXEN SODIUM 220 MG/1
81 TABLET, FILM COATED ORAL DAILY
Status: DISCONTINUED | OUTPATIENT
Start: 2025-03-11 | End: 2025-03-11 | Stop reason: HOSPADM

## 2025-03-10 RX ORDER — CLOPIDOGREL BISULFATE 300 MG/1
TABLET, FILM COATED ORAL AS NEEDED
Status: DISCONTINUED | OUTPATIENT
Start: 2025-03-10 | End: 2025-03-10 | Stop reason: HOSPADM

## 2025-03-10 RX ORDER — ASPIRIN 325 MG
325 TABLET ORAL ONCE
Status: COMPLETED | OUTPATIENT
Start: 2025-03-10 | End: 2025-03-10

## 2025-03-10 RX ORDER — ACETAMINOPHEN 325 MG/1
650 TABLET ORAL EVERY 4 HOURS PRN
Status: DISCONTINUED | OUTPATIENT
Start: 2025-03-10 | End: 2025-03-11 | Stop reason: HOSPADM

## 2025-03-10 RX ORDER — NICARDIPINE HYDROCHLORIDE 2.5 MG/ML
INJECTION INTRAVENOUS AS NEEDED
Status: DISCONTINUED | OUTPATIENT
Start: 2025-03-10 | End: 2025-03-10 | Stop reason: HOSPADM

## 2025-03-10 RX ORDER — MIDAZOLAM HYDROCHLORIDE 1 MG/ML
INJECTION, SOLUTION INTRAMUSCULAR; INTRAVENOUS AS NEEDED
Status: DISCONTINUED | OUTPATIENT
Start: 2025-03-10 | End: 2025-03-10 | Stop reason: HOSPADM

## 2025-03-10 RX ORDER — CLOPIDOGREL BISULFATE 75 MG/1
75 TABLET ORAL DAILY
Status: DISCONTINUED | OUTPATIENT
Start: 2025-03-11 | End: 2025-03-11 | Stop reason: HOSPADM

## 2025-03-10 RX ORDER — CLOPIDOGREL BISULFATE 75 MG/1
75 TABLET ORAL DAILY
Qty: 30 TABLET | Refills: 11 | Status: SHIPPED | OUTPATIENT
Start: 2025-03-11 | End: 2026-03-11

## 2025-03-10 RX ORDER — ACETAMINOPHEN 500 MG
10 TABLET ORAL NIGHTLY PRN
Status: DISCONTINUED | OUTPATIENT
Start: 2025-03-10 | End: 2025-03-11 | Stop reason: HOSPADM

## 2025-03-10 RX ORDER — HEPARIN SODIUM 10000 [USP'U]/100ML
0-4000 INJECTION, SOLUTION INTRAVENOUS CONTINUOUS
Status: DISCONTINUED | OUTPATIENT
Start: 2025-03-10 | End: 2025-03-10

## 2025-03-10 RX ADMIN — NITROGLYCERIN 0.5 INCH: 20 OINTMENT TOPICAL at 07:34

## 2025-03-10 RX ADMIN — CARVEDILOL 25 MG: 12.5 TABLET, FILM COATED ORAL at 21:03

## 2025-03-10 RX ADMIN — ASPIRIN 325 MG: 325 TABLET ORAL at 06:59

## 2025-03-10 RX ADMIN — NITROGLYCERIN 0.4 MG: 0.4 TABLET SUBLINGUAL at 07:27

## 2025-03-10 RX ADMIN — CARVEDILOL 25 MG: 12.5 TABLET, FILM COATED ORAL at 09:22

## 2025-03-10 RX ADMIN — ACETAMINOPHEN 650 MG: 325 TABLET ORAL at 15:42

## 2025-03-10 RX ADMIN — Medication 10 MG: at 21:12

## 2025-03-10 RX ADMIN — HEPARIN SODIUM 1000 UNITS/HR: 10000 INJECTION, SOLUTION INTRAVENOUS at 07:37

## 2025-03-10 SDOH — ECONOMIC STABILITY: FOOD INSECURITY: HOW HARD IS IT FOR YOU TO PAY FOR THE VERY BASICS LIKE FOOD, HOUSING, MEDICAL CARE, AND HEATING?: NOT HARD AT ALL

## 2025-03-10 SDOH — HEALTH STABILITY: PHYSICAL HEALTH
HOW OFTEN DO YOU NEED TO HAVE SOMEONE HELP YOU WHEN YOU READ INSTRUCTIONS, PAMPHLETS, OR OTHER WRITTEN MATERIAL FROM YOUR DOCTOR OR PHARMACY?: NEVER

## 2025-03-10 SDOH — ECONOMIC STABILITY: HOUSING INSECURITY: IN THE LAST 12 MONTHS, WAS THERE A TIME WHEN YOU WERE NOT ABLE TO PAY THE MORTGAGE OR RENT ON TIME?: NO

## 2025-03-10 SDOH — SOCIAL STABILITY: SOCIAL INSECURITY
WITHIN THE LAST YEAR, HAVE YOU BEEN RAPED OR FORCED TO HAVE ANY KIND OF SEXUAL ACTIVITY BY YOUR PARTNER OR EX-PARTNER?: NO

## 2025-03-10 SDOH — SOCIAL STABILITY: SOCIAL INSECURITY: HAVE YOU HAD ANY THOUGHTS OF HARMING ANYONE ELSE?: NO

## 2025-03-10 SDOH — SOCIAL STABILITY: SOCIAL INSECURITY: DOES ANYONE TRY TO KEEP YOU FROM HAVING/CONTACTING OTHER FRIENDS OR DOING THINGS OUTSIDE YOUR HOME?: NO

## 2025-03-10 SDOH — SOCIAL STABILITY: SOCIAL INSECURITY: DO YOU FEEL UNSAFE GOING BACK TO THE PLACE WHERE YOU ARE LIVING?: NO

## 2025-03-10 SDOH — ECONOMIC STABILITY: FOOD INSECURITY: WITHIN THE PAST 12 MONTHS, THE FOOD YOU BOUGHT JUST DIDN'T LAST AND YOU DIDN'T HAVE MONEY TO GET MORE.: NEVER TRUE

## 2025-03-10 SDOH — SOCIAL STABILITY: SOCIAL INSECURITY: ABUSE: ADULT

## 2025-03-10 SDOH — SOCIAL STABILITY: SOCIAL INSECURITY: WITHIN THE LAST YEAR, HAVE YOU BEEN HUMILIATED OR EMOTIONALLY ABUSED IN OTHER WAYS BY YOUR PARTNER OR EX-PARTNER?: NO

## 2025-03-10 SDOH — SOCIAL STABILITY: SOCIAL INSECURITY
WITHIN THE LAST YEAR, HAVE YOU BEEN KICKED, HIT, SLAPPED, OR OTHERWISE PHYSICALLY HURT BY YOUR PARTNER OR EX-PARTNER?: NO

## 2025-03-10 SDOH — SOCIAL STABILITY: SOCIAL INSECURITY: HAS ANYONE EVER THREATENED TO HURT YOUR FAMILY OR YOUR PETS?: NO

## 2025-03-10 SDOH — HEALTH STABILITY: PHYSICAL HEALTH: ON AVERAGE, HOW MANY MINUTES DO YOU ENGAGE IN EXERCISE AT THIS LEVEL?: PATIENT DECLINED

## 2025-03-10 SDOH — ECONOMIC STABILITY: TRANSPORTATION INSECURITY: IN THE PAST 12 MONTHS, HAS LACK OF TRANSPORTATION KEPT YOU FROM MEDICAL APPOINTMENTS OR FROM GETTING MEDICATIONS?: NO

## 2025-03-10 SDOH — ECONOMIC STABILITY: HOUSING INSECURITY: AT ANY TIME IN THE PAST 12 MONTHS, WERE YOU HOMELESS OR LIVING IN A SHELTER (INCLUDING NOW)?: NO

## 2025-03-10 SDOH — SOCIAL STABILITY: SOCIAL INSECURITY: WITHIN THE LAST YEAR, HAVE YOU BEEN AFRAID OF YOUR PARTNER OR EX-PARTNER?: NO

## 2025-03-10 SDOH — ECONOMIC STABILITY: FOOD INSECURITY: WITHIN THE PAST 12 MONTHS, YOU WORRIED THAT YOUR FOOD WOULD RUN OUT BEFORE YOU GOT THE MONEY TO BUY MORE.: NEVER TRUE

## 2025-03-10 SDOH — SOCIAL STABILITY: SOCIAL INSECURITY: HAVE YOU HAD THOUGHTS OF HARMING ANYONE ELSE?: NO

## 2025-03-10 SDOH — HEALTH STABILITY: PHYSICAL HEALTH
ON AVERAGE, HOW MANY DAYS PER WEEK DO YOU ENGAGE IN MODERATE TO STRENUOUS EXERCISE (LIKE A BRISK WALK)?: PATIENT DECLINED

## 2025-03-10 SDOH — ECONOMIC STABILITY: INCOME INSECURITY: IN THE PAST 12 MONTHS HAS THE ELECTRIC, GAS, OIL, OR WATER COMPANY THREATENED TO SHUT OFF SERVICES IN YOUR HOME?: NO

## 2025-03-10 SDOH — SOCIAL STABILITY: SOCIAL INSECURITY: WERE YOU ABLE TO COMPLETE ALL THE BEHAVIORAL HEALTH SCREENINGS?: YES

## 2025-03-10 SDOH — SOCIAL STABILITY: SOCIAL INSECURITY: DO YOU FEEL ANYONE HAS EXPLOITED OR TAKEN ADVANTAGE OF YOU FINANCIALLY OR OF YOUR PERSONAL PROPERTY?: NO

## 2025-03-10 SDOH — SOCIAL STABILITY: SOCIAL INSECURITY: ARE THERE ANY APPARENT SIGNS OF INJURIES/BEHAVIORS THAT COULD BE RELATED TO ABUSE/NEGLECT?: NO

## 2025-03-10 SDOH — SOCIAL STABILITY: SOCIAL INSECURITY: ARE YOU OR HAVE YOU BEEN THREATENED OR ABUSED PHYSICALLY, EMOTIONALLY, OR SEXUALLY BY ANYONE?: NO

## 2025-03-10 SDOH — ECONOMIC STABILITY: HOUSING INSECURITY: IN THE PAST 12 MONTHS, HOW MANY TIMES HAVE YOU MOVED WHERE YOU WERE LIVING?: 1

## 2025-03-10 ASSESSMENT — PAIN - FUNCTIONAL ASSESSMENT
PAIN_FUNCTIONAL_ASSESSMENT: 0-10

## 2025-03-10 ASSESSMENT — COGNITIVE AND FUNCTIONAL STATUS - GENERAL
PATIENT BASELINE BEDBOUND: NO
MOBILITY SCORE: 24
MOBILITY SCORE: 24
DAILY ACTIVITIY SCORE: 24

## 2025-03-10 ASSESSMENT — PAIN DESCRIPTION - LOCATION
LOCATION: CHEST
LOCATION: HEAD

## 2025-03-10 ASSESSMENT — ACTIVITIES OF DAILY LIVING (ADL)
BATHING: INDEPENDENT
BATHING: INDEPENDENT
JUDGMENT_ADEQUATE_SAFELY_COMPLETE_DAILY_ACTIVITIES: YES
WALKS IN HOME: INDEPENDENT
DRESSING YOURSELF: INDEPENDENT
LACK_OF_TRANSPORTATION: NO
GROOMING: INDEPENDENT
ADEQUATE_TO_COMPLETE_ADL: YES
ADEQUATE_TO_COMPLETE_ADL: YES
HEARING - LEFT EAR: FUNCTIONAL
DRESSING YOURSELF: INDEPENDENT
FEEDING YOURSELF: INDEPENDENT
JUDGMENT_ADEQUATE_SAFELY_COMPLETE_DAILY_ACTIVITIES: YES
PATIENT'S MEMORY ADEQUATE TO SAFELY COMPLETE DAILY ACTIVITIES?: YES
PATIENT'S MEMORY ADEQUATE TO SAFELY COMPLETE DAILY ACTIVITIES?: YES
FEEDING YOURSELF: INDEPENDENT
TOILETING: INDEPENDENT
TOILETING: INDEPENDENT
GROOMING: INDEPENDENT
WALKS IN HOME: INDEPENDENT
HEARING - RIGHT EAR: FUNCTIONAL
HEARING - RIGHT EAR: FUNCTIONAL
HEARING - LEFT EAR: FUNCTIONAL

## 2025-03-10 ASSESSMENT — PAIN SCALES - GENERAL
PAINLEVEL_OUTOF10: 0 - NO PAIN
PAINLEVEL_OUTOF10: 7
PAINLEVEL_OUTOF10: 0 - NO PAIN
PAINLEVEL_OUTOF10: 6
PAINLEVEL_OUTOF10: 0 - NO PAIN

## 2025-03-10 ASSESSMENT — LIFESTYLE VARIABLES
HAVE YOU EVER FELT YOU SHOULD CUT DOWN ON YOUR DRINKING: NO
EVER FELT BAD OR GUILTY ABOUT YOUR DRINKING: NO
HOW OFTEN DO YOU HAVE A DRINK CONTAINING ALCOHOL: NEVER
AUDIT-C TOTAL SCORE: 0
HOW MANY STANDARD DRINKS CONTAINING ALCOHOL DO YOU HAVE ON A TYPICAL DAY: PATIENT DOES NOT DRINK
HAVE PEOPLE ANNOYED YOU BY CRITICIZING YOUR DRINKING: NO
AUDIT-C TOTAL SCORE: 0
EVER HAD A DRINK FIRST THING IN THE MORNING TO STEADY YOUR NERVES TO GET RID OF A HANGOVER: NO
SKIP TO QUESTIONS 9-10: 1
HOW OFTEN DO YOU HAVE 6 OR MORE DRINKS ON ONE OCCASION: NEVER
TOTAL SCORE: 0

## 2025-03-10 ASSESSMENT — HEART SCORE
HEART SCORE: 9
AGE: 65+
ECG: NON-SPECIFIC REPOLARIZATION DISTURBANCE
HISTORY: HIGHLY SUSPICIOUS
RISK FACTORS: >2 RISK FACTORS OR HX OF ATHEROSCLEROTIC DISEASE
TROPONIN: GREATER THAN OR EQUAL TO 3 TIMES NORMAL LIMIT

## 2025-03-10 ASSESSMENT — PAIN DESCRIPTION - FREQUENCY: FREQUENCY: CONSTANT/CONTINUOUS

## 2025-03-10 ASSESSMENT — PAIN DESCRIPTION - ORIENTATION: ORIENTATION: LEFT

## 2025-03-10 NOTE — ED PROVIDER NOTES
HPI   No chief complaint on file.      HPI: 64-year-old male history of coronary artery disease status post stent most recently in 2022 as well as a TAVR in October of last year presents with chest pain.  Patient states that he has had substernal chest pain since Friday off and on.  He states he took a couple the nitro on Friday a couple nitro over the weekend but stated he was persistently having chest pain and getting some shortness of breath that is why he came in this morning.  He did not take any nitro today.  He denies any new swelling in his legs.  He denies any calf pain or tenderness.  He denies any history of DVT or PE.  He states he sees Dr. Harrison.  He denies being on any blood thinners.  It appears that he has got a history of multiple stents and did have an episode of an in-stent restenosis    Family HX: Denies any significant/pertinent family history.  Social Hx: Denies ETOH or drug use.  Review of systems:  Gen.: No weight loss, fatigue, anorexia, insomnia, fever.   Eyes: No vision loss, double vision, drainage, eye pain.   ENT: No pharyngitis, dry mouth.   Cardiac: No palpitations, syncope, near syncope.   Pulmonary: No shortness of breath, cough, hemoptysis.   Heme/lymph: No swollen glands, fever, bleeding.   GI: No abdominal pain, change in bowel habits, melena, hematemesis, hematochezia, nausea, vomiting, diarrhea.   : No discharge, dysuria, frequency, urgency, hematuria.   Musculoskeletal: No limb pain, joint pain, joint swelling.   Skin: No rashes.   Psych: No depression, anxiety, suicidality, homicidality.   Review of systems is otherwise negative unless stated above or in history of present illness.    Physical Exam:    Appearance: Alert, oriented , cooperative,  in no acute distress. Well nourished & well hydrated.    Skin: Intact,  dry skin, no lesions, rash, petechiae or purpura.     Eyes: PERRLA, EOMs intact,  Conjunctiva pink with no redness or exudates. Eyelids without lesions.  No scleral icterus.     ENT: Hearing grossly intact. External auditory canals patent, tympanic membranes intact with visible landmarks. Nares patent, mucus membranes moist. Dentition without lesions. Pharynx clear, uvula midline.     Neck: Supple, without meningismus. Thyroid not palpable. Trachea at midline. No lymphadenopathy.    Pulmonary: Clear bilaterally with good chest wall excursion. No rales, rhonchi or wheezing. No accessory muscle use or stridor.    Cardiac: Normal S1, S2 without murmur, rub, gallop or extrasystole. No JVD, Carotids without bruits.    Abdomen: Soft, nontender, active bowel sounds.  No palpable organomegaly.  No rebound or guarding.  No CVA tenderness.    Genitourinary: Exam deferred.    Musculoskeletal: Full range of motion. no pain, edema, or deformity. Pulses full and equal. No cyanosis, clubbing, or edema.    Neurological:  Cranial nerves II through XII are grossly intact, finger-nose touch is normal, normal sensation, no weakness, no focal findings identified.    Psychiatric: Appropriate mood and affect.     Medical Decision-Making:  Testing: EKG was obtained which is interpreted by me showed a sinus rhythm without evidence of obvious ST elevations or T wave inversions to indicate acute ischemia or infarct.  Patient given aspirin and nitroglycerin.  Given his history I will recommend admission.  We will also check labs including a series of troponin.  Treatment:   Reevaluation:   Plan: AdmitPatient and family/friend/caregiver are in agreement with this plan.   Impression:   1. Chest pain  2.                  Patient History   Past Medical History:   Diagnosis Date    Personal history of other diseases of the circulatory system 10/20/2022    History of aortic valve stenosis     Past Surgical History:   Procedure Laterality Date    OTHER SURGICAL HISTORY  10/27/2020    Cholecystectomy    OTHER SURGICAL HISTORY  10/27/2020    Cervical laminectomy    OTHER SURGICAL HISTORY  10/27/2020     Colonoscopy    OTHER SURGICAL HISTORY  2022    Percutaneous transluminal coronary angioplasty    OTHER SURGICAL HISTORY  2022    Cardiac catheterization with stent placement    OTHER SURGICAL HISTORY  2022    Tonsillectomy with adenoidectomy     Family History   Problem Relation Name Age of Onset    Hyperlipidemia Mother      Cancer Father      Stroke Maternal Grandfather      Heart attack Maternal Grandfather       Social History     Tobacco Use    Smoking status: Former     Current packs/day: 0.00     Average packs/day: 1.5 packs/day for 24.0 years (36.0 ttl pk-yrs)     Types: Cigarettes     Start date:      Quit date:      Years since quittin.2    Smokeless tobacco: Never   Substance Use Topics    Alcohol use: Not Currently     Alcohol/week: 4.0 standard drinks of alcohol     Types: 4 Glasses of wine per week    Drug use: Never       Physical Exam   ED Triage Vitals   Temp Pulse Resp BP   -- -- -- --      SpO2 Temp src Heart Rate Source Patient Position   -- -- -- --      BP Location FiO2 (%)     -- --       Physical Exam      ED Course & MDM   Diagnoses as of 03/10/25 0644   Chest pain, unspecified type                 No data recorded                                 Medical Decision Making      Procedure  Procedures     Carolina Combs MD  03/10/25 0649

## 2025-03-10 NOTE — Clinical Note
Vessel: RCA (proximal). Stent inserted and advanced. Inflation 1: Pressure = 12 bernadine; Duration = 30 sec. Inflation 2: Pressure = 12 bernadine; Duration = 30 sec. Inflation 3: Pressure = 12 bernadine; Duration = 15 sec.

## 2025-03-10 NOTE — SIGNIFICANT EVENT
Pt requests that HOB stay flat so that he can continue sleeping. Right groin site soft and stable with no hematoma or oozing.

## 2025-03-10 NOTE — ED PROCEDURE NOTE
Procedure  Critical Care    Performed by: Kory IYER MD  Authorized by: Kory IYER MD    Critical care provider statement:     Critical care time (minutes):  35    Critical care time was exclusive of:  Separately billable procedures and treating other patients    Critical care was necessary to treat or prevent imminent or life-threatening deterioration of the following conditions:  Cardiac failure (Non-ST elevation MI requiring IV heparin)    Critical care was time spent personally by me on the following activities:  Blood draw for specimens, discussions with primary provider, development of treatment plan with patient or surrogate, discussions with consultants, evaluation of patient's response to treatment, examination of patient, obtaining history from patient or surrogate, ordering and performing treatments and interventions, ordering and review of laboratory studies, ordering and review of radiographic studies, pulse oximetry and re-evaluation of patient's condition    Care discussed with: admitting provider                 Kory IYER MD  03/10/25 0779

## 2025-03-10 NOTE — H&P (VIEW-ONLY)
Consults                                                      Date:   3/10/2025  Patient name:  Emiliano Mann  Date of admission:  3/10/2025  6:38 AM  MRN:   [id number]  YOB: 1960  Time of Consult:  9:07 AM    Consulting Cardiologist/ADALBERTO:   KVNG Yi CNP  Primary Cardiologist:  Dr. Antoine Harrison    Referring Provider: Dr. Scott      Admission Diagnosis:     Chest pain, unspecified type      History of Present Illness:      64-year-old male with past medical history of CAD status post remote STEMI with PCI, aortic valve stenosis status post TAVR, former smoker, hypertension, mixed hyperlipidemia, statin intolerance, COPD, obesity who presented to [facility] emergency department with complaints of midsternal chest pressure that initially started Friday but has been off and on with frequency.  He apparently took couple nitro on Friday and over the weekend just to get by however chest pain continues to come back.  He also endorses some mild shortness of breath.  He denies any swelling in his legs.  Denies any calf pain or tenderness.  Denies any palpitations, nausea or vomiting.  His normal cardiologist is Dr. Harrison.  He has a history of 6 cardiac stents in the past.  Emergency department he was noted to be significantly hypertensive.  Lab work was unremarkable besides troponin elevation of 439 and second one was 423.  Chest x-ray was unremarkable.  Initial EKG showed normal sinus rhythm with a heart rate of 64.  Nonspecific ST and T wave abnormalities.  No STEMI criteria.  Some noted T wave inversions in the inferior leads which is new.  No STEMI criteria.  The patient was admitted to the medicine service and general cardiology was consulted for chest pain.    Previous Cardiac Testing:    Echocardiogram:   Recent Labs     02/27/24  1428   EF 55   LVIDD 5.16   RV 17.6   RVFRWALLPKSP 19.40   TAPSE 2.6   Transthoracic Echo (TTE) Complete  02/27/2024    [place] [address]  Tel [phone] Fax [phone]    TRANSTHORACIC ECHOCARDIOGRAM REPORT      Patient Name:      PRATIK NEWTON MICHELLE Spear Physician:    [id number] Mikey Lucia DO  Study Date:        2/27/2024            Ordering Provider:    [id number] RADHA GONZÁLEZ  BELKIS  MRN/PID:           [id number]             Fellow:  Accession#:        [id number]         Nurse:                Chiki Gutierrez RN  Date of Birth/Age: 1960 / 63 years Sonographer:          Ena Pacheco RDMARY ELLEN  Gender:            M                    Additional Staff:  Height:            172.72 cm            Admit Date:           2/27/2024  Weight:            94.35 kg             Admission Status:     Inpatient -  Routine  BSA / BMI:         2.08 m2 / 31.63      Department Location:  [place] [address]                                      Echo Lab  Blood Pressure: 134 /85 mmHg    Study Type:    TRANSTHORACIC ECHO (TTE) COMPLETE  Diagnosis/ICD: Transient cerebral ischemic attack, unspecified (NOT on  LCD)-G45.9; Other transient cerebral ischemic attacks and related  syndromes-G45.8  Indication:    TIA/CVA; Transient Monocular Blindness (Left); Hx TAVR  CPT Codes:     Echo Complete w Full Doppler-48094    Patient History:  Valve Disorders:   Aortic Valve Replacement.  Pertinent History: CAD, HTN, Hyperlipidemia and Hx MI, Transient Monocular  Blindness - Left Eye; S/P TAVR (#26 De La Torre Earline 3), PCIs.  S/P TAVR.    Study Detail: The following Echo studies were performed: 2D, M-Mode, Doppler and  color flow. Technically challenging study due to prominent lung  artifact. Agitated saline used as a contrast agent for intraseptal  flow evaluation.      PHYSICIAN INTERPRETATION:  Left Ventricle: Left ventricular systolic function is normal, with an estimated ejection fraction of 55%. There are no regional wall motion abnormalities. The left ventricular cavity size is normal. There is  mild to moderate concentric left ventricular hypertrophy. Spectral Doppler shows an impaired relaxation pattern of left ventricular diastolic filling.  LV Wall Scoring:  All segments are normal.    Left Atrium: The left atrium is mildly dilated. A bubble study using agitated saline was performed. Bubble study is negative.  Right Ventricle: The right ventricle is normal in size. There is normal right ventricular global systolic function.  Right Atrium: The right atrium is normal in size.  Aortic Valve: There is a prosthetic aortic valve present. There is no evidence of aortic valve stenosis.  There is an De La Torre transcatheter aortic valve replacement, with a 26 mm reported size. Echo findings are consistent with normal aortic valve prosthesis structure and function. There is no evidence of aortic valve regurgitation. The peak instantaneous gradient of the aortic valve is 41.2 mmHg. The mean gradient of the aortic valve is 18.0 mmHg.  Mitral Valve: The mitral valve is normal in structure. There is no evidence of mitral valve stenosis. There is normal mitral valve leaflet mobility. There is trace mitral valve regurgitation.  Tricuspid Valve: The tricuspid valve is structurally normal. There is normal tricuspid valve leaflet mobility. There is trace tricuspid regurgitation.  Pulmonic Valve: The pulmonic valve is structurally normal. There is no indication of pulmonic valve regurgitation.  Pericardium: There is no pericardial effusion noted.  Aorta: The aortic root is normal.  Pulmonary Artery: The main pulmonary artery is normal in size, and position, with normal bifurcation into the left and right pulmonary arteries.  Systemic Veins: The inferior vena cava appears to be of normal size.  In comparison to the previous echocardiogram(s): Prior examinations are available and were reviewed for comparison purposes. The left ventricular function is unchanged. The left ventricular hypertrophy is worse. The left ventricular  diastolic function is unchanged.      CONCLUSIONS:  1. Left ventricular systolic function is normal with a 55% estimated ejection fraction.  2. Spectral Doppler shows an impaired relaxation pattern of left ventricular diastolic filling.  3. There is no evidence of mitral valve stenosis.  4. Trace mitral valve regurgitation.  5. Trace tricuspid regurgitation is visualized.  6. Aortic valve stenosis is not present.  7. There is a transcatheter aortic valve replacement.  8. The main pulmonary artery is normal in size, and position, with normal bifurcation into the left and right pulmonary arteries.    RECOMMENDATIONS:  Technically suboptimal and limited study, therefore accuracy of above interpretation could be substantially diminished. Clinical correlation is advised. Consider additional imaging modalities if clinically indicated. Transthoracic echo has low negative predictive value for mass, vegetation, or embolic source. Consider JUDIT or MRI if clinically indicated.    QUANTITATIVE DATA SUMMARY:  2D MEASUREMENTS:  Normal Ranges:  Ao Root d:     3.40 cm    (2.0-3.7cm)  IVSd:          1.35 cm    (0.6-1.1cm)  LVPWd:         1.19 cm    (0.6-1.1cm)  LVIDd:         5.16 cm    (3.9-5.9cm)  LVIDs:         2.32 cm  LV Mass Index: 128.1 g/m2  LV % FS        55.0 %    LA VOLUME:  Normal Ranges:  LA Vol A4C:        66.0 ml    (22+/-6mL/m2)  LA Vol A2C:        76.9 ml  LA Vol BP:         75.2 ml  LA Vol Index A4C:  31.8ml/m2  LA Vol Index A2C:  37.0 ml/m2  LA Vol Index BP:   36.2 ml/m2  LA Area A4C:       20.1 cm2  LA Area A2C:       22.9 cm2  LA Major Axis A4C: 5.2 cm  LA Major Axis A2C: 5.8 cm  LA Volume Index:   34.6 ml/m2  LA Vol A4C:        64.0 ml  LA Vol A2C:        74.0 ml    RA VOLUME BY A/L METHOD:  Normal Ranges:  RA Vol A4C:        43.0 ml    (8.3-19.5ml)  RA Vol Index A4C:  20.7 ml/m2  RA Area A4C:       15.9 cm2  RA Major Axis A4C: 5.0 cm    LV SYSTOLIC FUNCTION BY 2D PLANIMETRY (MOD):  Normal Ranges:  EF-A4C View:  57.1 % (>=55%)  EF-A2C View: 55.0 %  EF-Biplane:  55.0 %    LV DIASTOLIC FUNCTION:  Normal Ranges:  MV Peak E:    0.80 m/s (0.7-1.2 m/s)  MV Peak A:    1.19 m/s (0.42-0.7 m/s)  E/A Ratio:    0.67     (1.0-2.2)  MV e'         0.06 m/s (>8.0)  MV lateral e' 0.07 m/s  MV medial e'  0.06 m/s  E/e' Ratio:   12.23    (<8.0)    MITRAL VALVE:  Normal Ranges:  MV DT: 230 msec (150-240msec)    AORTIC VALVE:  Normal Ranges:  AoV Vmax:                3.21 m/s  (<=1.7m/s)  AoV Peak P.2 mmHg (<20mmHg)  AoV Mean P.0 mmHg (1.7-11.5mmHg)  LVOT Max Escobar:            0.96 m/s  (<=1.1m/s)  AoV VTI:                 60.80 cm  (18-25cm)  LVOT VTI:                19.00 cm  AoV Dimensionless Index: 0.31      RIGHT VENTRICLE:  TAPSE: 26.0 mm  RV s'  0.19 m/s    TRICUSPID VALVE/RVSP:  Normal Ranges:  Peak TR Velocity: 1.91 m/s  RV Syst Pressure: 17.6 mmHg (< 30mmHg)    PULMONIC VALVE:  Normal Ranges:  PV Accel Time: 108 msec (>120ms)  PV Max Escobar:    0.8 m/s  (0.6-0.9m/s)  PV Max P.5 mmHg  PV Mean P.0 mmHg  PV VTI:        16.70 cm      [id number] Mikey Lucia DO  Electronically signed on 2024 at 3:15:00 PM      Wall Scoring        ** Final **    Coronary Angiography:   Adult Cath     Narrative  [facility], Cath Lab  [address] [place] [address]    Cardiovascular Catheterization Report    Patient Name:     PRATIK Simmons           [id number] Antoine MARTINEZ         Physician:           MD  Study Date:       2022    Verifying Physician: [id number] Tan MD  MRN/PID:          [id number]     Cardiologist:  Accession/Order#: [id number]    Referring Physician: [id number] ANTOINE TAN  YOB: 1960    Referring Physician: [id number] Antoine Tan MD  Gender:           M            Referring Physician:      Study: Left Heart Catheterization      Indications:  PRATIK MARTINEZ is a 62 year old male who presents with dyslipidemia,  hypertension, prior percutaneous coronary intervention, tobacco Use - former and chronic pulmonary disease. Valvular disease, with a chest pain assessment of atypical angina. Study performed as an elective cath procedure.    Medical History:  Stress test performed: No. CTA performed: No. Lillian accessed: No. LVEF Assessed: No.    Procedure Description:  After infiltration with 2% Lidocaine, the right femoral artery was cannulated with a modified Seldinger technique. Subsequently a 5 Yakut sheath was placed in the right femoral artery. After infiltration of local anesthetic, the right femoral vein was cannulated with a percutaneous technique. A 7 Yakut sheath was placed in the vein. Selective coronary catheterization was performed using a 5 Fr catheter(s) exchanged over a guide wire to cannulate the coronary arteries. A JL 4 tip catheter was used for left coronary injections. A 3DRC tip catheter was used for right coronary injections.  Multiple injections of contrast were made into the left and right coronary arteries with angiograms recorded in multiple projections. A balloon tipped catheter was advanced through the right heart to record pressures. Cardiac output was calculated via the Steven method. After completion of the procedure, femoral artery angiography was performed. This demonstrated a common femoral artery puncture appropriate for closure. A Vascade 5F vascular closure Device was placed per protocol. Post-procedure, the venous sheath was pulled and pressure was applied to the site.    Coronary Angiography:  The coronary circulation is right dominant.    Left Main Coronary Artery:  There is 10% stenosis in the distal left main coronary artery.    Left Anterior Descending Coronary Artery Distribution:  There is 50% stenosis in the mid left anterior descending artery. Contains patent previously placed stents. There is 50-70% stenosis in the Prox 2nd Diag left anterior descending artery.    Circumflex  Coronary Artery Distribution:  There is 100% stenosis in the the mid Circumflex artery. There is evidence of instent restenosis in this segment. There is 80% stenosis in the Mid 2nd OM Circumflex artery.    Right Heart Catheterization:  A balloon tipped catheter was advanced through the right heart to record pressures. Cardiac output was calculated via the Steven method. Normal filling pressures. Cardiac output is mildly decreased. Pulmonary arterial hypertension.    Right Coronary Artery Distribution:    Contains patent previously placed stents. There is 50% stenosis in the the proximal Right Coronary Artery.      Left Ventriculography:  The estimated left ventricular ejection fraction is normal at 60%. There is severe aortic stenosis noted.      Cardiac Cath Transition of Care Summary:  Post Procedure Diagnosis: Triple vessel disease.  Blood Loss:               Estimated blood loss during the procedure was 0 mls.  Specimens Removed:        Number of specimen(s) removed: none.    ____________________________________________________________________________________  CONCLUSIONS:  1. Mildly decreased cardiac output.  2. Distal Left Main: 10% stenosis.  3. Left Anterior Descending Artery: contains patent previously placed stents.  4. Mid LAD Lesion: The percent stenosis is 50%.  5. Prox 2nd Diag LAD Lesion: The percent stenosis is 50-70%.  6. Mid CX Lesion: The percent stenosis is 100%.  7. Mid CX Lesion: Instent Restenosis.  8. Mid 2nd OM CX Lesion: The percent stenosis is 80% .  9. Right Coronary Artery: contains patent previously placed stents.  10. Proximal RCA Lesion: The percent stenosis is 50%.  11. The Left Ventricular Ejection Fraction is 60%,by echo.  12. Aortic Stenosis: Severe, by echo.  13. Normal filling pressures.  14. Pulmonary arterial hypertension, mild.    ____________________________________________________________________________________  CPT Codes:  Coronary Angiography S&I only ([facility])([facility])-09740;  Moderate Sedation Services initial 15 minutes patient >5 years-62572; Right Heart Cath O2/Cardiac output without biopsy ([facility])-07686    ICD 10 Codes:  I25.119-Atherosclerotic heart disease of native coronary artery with unspecified angina pectoris; I35.0-Nonrheumatic aortic (valve) stenosis    [id number] Antoine Tan MD  Performing Physician  Electronically signed by [id number] Antoine Tan MD on 8/12/2022 at 9:14:13  AM      cc Report to: [id number] ANTOINE TAN    cc Report to: [id number] Antoine Tan MD           Final     Nuclear:No results found for this or any previous visit from the past 1800 days.          Allergies:     Allergies   Allergen Reactions    Ezetimibe Myalgia    Lisinopril Cough    Losartan Potassium Dizziness     Patient had vertigo    Morphine Nausea/vomiting    Statins-Hmg-Coa Reductase Inhibitors Myalgia     Leg, thighs cramps  Hx of pravastatin, simvastatin         Past Medical History:     Past Medical History:   Diagnosis Date    Personal history of other diseases of the circulatory system 10/20/2022    History of aortic valve stenosis       Past Surgical History:     Past Surgical History:   Procedure Laterality Date    OTHER SURGICAL HISTORY  10/27/2020    Cholecystectomy    OTHER SURGICAL HISTORY  10/27/2020    Cervical laminectomy    OTHER SURGICAL HISTORY  10/27/2020    Colonoscopy    OTHER SURGICAL HISTORY  04/13/2022    Percutaneous transluminal coronary angioplasty    OTHER SURGICAL HISTORY  04/13/2022    Cardiac catheterization with stent placement    OTHER SURGICAL HISTORY  04/13/2022    Tonsillectomy with adenoidectomy       Family History:     Family History   Problem Relation Name Age of Onset    Hyperlipidemia Mother      Cancer Father      Stroke Maternal Grandfather      Heart attack Maternal Grandfather         Social History:     Social History     Tobacco Use    Smoking status: Former     Current packs/day: 0.00     Average packs/day: 1.5 packs/day for  "24.0 years (36.0 ttl pk-yrs)     Types: Cigarettes     Start date:      Quit date:      Years since quittin.2    Smokeless tobacco: Never   Substance Use Topics    Alcohol use: Not Currently     Alcohol/week: 4.0 standard drinks of alcohol     Types: 4 Glasses of wine per week    Drug use: Never       CURRENT INPATIENT MEDICATIONS    carvedilol, 25 mg, oral, BID      heparin, 0-4,000 Units/hr, Last Rate: 1,000 Units/hr (03/10/25 0737)      Current Outpatient Medications   Medication Instructions    amLODIPine (NORVASC) 10 mg, oral, Daily    ASCORBIC ACID, VITAMIN C, ORAL 282 mg, Daily    aspirin 81 mg, 3 times weekly    calcium carbonate (TUMS EXTRA STRENGTH) 600 mg, Nightly PRN    carvedilol (COREG) 25 mg, oral, 2 times daily    cholecalciferol (VITAMIN D-3) 5,000 Units, Daily    ezetimibe (ZETIA) 10 mg, oral, Daily    famotidine (PEPCID) 20 mg, Daily PRN    methylPREDNISolone (Medrol Dospak) 4 mg tablets Follow schedule on package instructions    methylPREDNISolone (Medrol Dospak) 4 mg tablets Take as directed on package.    multivitamin tablet 1 tablet, 3 times weekly    nitroglycerin (NITROSTAT) 0.4 mg, Every 5 min PRN    omega 3-dha-epa-fish oil (Fish OiL) 1,200 (144-216) mg capsule 1 capsule, Daily    omeprazole OTC (PRILOSEC OTC) 20 mg, Daily PRN    vitamin B complex tablet 1 tablet, Daily        Review of Systems:      12 point review of systems was obtained in detail and is negative other than that detailed above.    Vital Signs:     Vitals:    03/10/25 0649 03/10/25 0724 03/10/25 0733 03/10/25 0746   BP: (!) 180/109 (!) 194/123 (!) 173/93 147/89   BP Location: Right arm      Patient Position: Sitting      Pulse: 67  71 62   Resp: 18  18    Temp: 36.7 °C (98.1 °F)      TempSrc: Temporal      SpO2: 99%  96%    Weight: 95.3 kg (210 lb)      Height: 1.727 m (5' 8\")        No intake or output data in the 24 hours ending 03/10/25 0907    Wt Readings from Last 4 Encounters:   03/10/25 95.3 kg (210 " lb)   01/17/25 90.7 kg (200 lb)   11/11/24 93 kg (205 lb)   10/28/24 97.9 kg (215 lb 14.4 oz)       Physical Examination:     Physical Exam  Vitals and nursing note reviewed.   HENT:      Head: Normocephalic.      Mouth/Throat:      Mouth: Mucous membranes are moist.   Eyes:      Pupils: Pupils are equal, round, and reactive to light.   Cardiovascular:      Rate and Rhythm: Normal rate and regular rhythm.   Pulmonary:      Effort: Pulmonary effort is normal.   Abdominal:      General: Bowel sounds are normal.      Palpations: Abdomen is soft.   Musculoskeletal:         General: Normal range of motion.   Skin:     General: Skin is warm and dry.      Capillary Refill: Capillary refill takes less than 2 seconds.   Neurological:      Mental Status: He is alert and oriented to person, place, and time.   Psychiatric:         Mood and Affect: Mood normal.           Lab:     CBC:   Results from last 7 days   Lab Units 03/10/25  0652   WBC AUTO x10*3/uL 9.2   RBC AUTO x10*6/uL 5.02   HEMOGLOBIN g/dL 15.7   HEMATOCRIT % 44.5   MCV fL 89   MCH pg 31.3   MCHC g/dL 35.3   RDW % 11.9   PLATELETS AUTO x10*3/uL 254     CMP:    Results from last 7 days   Lab Units 03/10/25  0652   SODIUM mmol/L 137   POTASSIUM mmol/L 3.8   CHLORIDE mmol/L 104   CO2 mmol/L 24   BUN mg/dL 14   CREATININE mg/dL 1.04   GLUCOSE mg/dL 98   PROTEIN TOTAL g/dL 7.0   CALCIUM mg/dL 9.2   BILIRUBIN TOTAL mg/dL 0.6   ALK PHOS U/L 53   AST U/L 16   ALT U/L 18     BMP:    Results from last 7 days   Lab Units 03/10/25  0652   SODIUM mmol/L 137   POTASSIUM mmol/L 3.8   CHLORIDE mmol/L 104   CO2 mmol/L 24   BUN mg/dL 14   CREATININE mg/dL 1.04   CALCIUM mg/dL 9.2   GLUCOSE mg/dL 98     Magnesium:  Results from last 7 days   Lab Units 03/10/25  0652   MAGNESIUM mg/dL 2.03     Troponin:    Results from last 7 days   Lab Units 03/10/25  0752 03/10/25  0652   TROPHS ng/L 423* 439*     BNP:   Results from last 7 days   Lab Units 03/10/25  0652   BNP pg/mL 72     Lipid  Panel:         Diagnostic Studies:   @No results found for this or any previous visit.    Results for orders placed during the hospital encounter of 02/27/24    Transthoracic Echo (TTE) Complete    Narrative  [address] [address]  Tel [phone] Fax [phone]    TRANSTHORACIC ECHOCARDIOGRAM REPORT      Patient Name:      PRATIK NEWTON MICHELLE Spear Physician:    [id number] Mikey Lucia DO  Study Date:        2/27/2024            Ordering Provider:    [id number] RADHA TAMAYO  MRN/PID:           [id number]             Fellow:  Accession#:        [id number]         Nurse:                Chiki Gutierrez RN  Date of Birth/Age: 1960 / 63 years Sonographer:          Ena Pacheco RDCS  Gender:            M                    Additional Staff:  Height:            172.72 cm            Admit Date:           2/27/2024  Weight:            94.35 kg             Admission Status:     Inpatient -  Routine  BSA / BMI:         2.08 m2 / 31.63      Department Location:  [place] [address]                                      Echo Lab  Blood Pressure: 134 /85 mmHg    Study Type:    TRANSTHORACIC ECHO (TTE) COMPLETE  Diagnosis/ICD: Transient cerebral ischemic attack, unspecified (NOT on  LCD)-G45.9; Other transient cerebral ischemic attacks and related  syndromes-G45.8  Indication:    TIA/CVA; Transient Monocular Blindness (Left); Hx TAVR  CPT Codes:     Echo Complete w Full Doppler-69446    Patient History:  Valve Disorders:   Aortic Valve Replacement.  Pertinent History: CAD, HTN, Hyperlipidemia and Hx MI, Transient Monocular  Blindness - Left Eye; S/P TAVR (#26 De La Torre Earline 3), PCIs.  S/P TAVR.    Study Detail: The following Echo studies were performed: 2D, M-Mode, Doppler and  color flow. Technically challenging study due to prominent lung  artifact. Agitated saline used as a contrast agent for intraseptal  flow evaluation.      PHYSICIAN INTERPRETATION:  Left Ventricle: Left  ventricular systolic function is normal, with an estimated ejection fraction of 55%. There are no regional wall motion abnormalities. The left ventricular cavity size is normal. There is mild to moderate concentric left ventricular hypertrophy. Spectral Doppler shows an impaired relaxation pattern of left ventricular diastolic filling.  LV Wall Scoring:  All segments are normal.    Left Atrium: The left atrium is mildly dilated. A bubble study using agitated saline was performed. Bubble study is negative.  Right Ventricle: The right ventricle is normal in size. There is normal right ventricular global systolic function.  Right Atrium: The right atrium is normal in size.  Aortic Valve: There is a prosthetic aortic valve present. There is no evidence of aortic valve stenosis.  There is an De La Torre transcatheter aortic valve replacement, with a 26 mm reported size. Echo findings are consistent with normal aortic valve prosthesis structure and function. There is no evidence of aortic valve regurgitation. The peak instantaneous gradient of the aortic valve is 41.2 mmHg. The mean gradient of the aortic valve is 18.0 mmHg.  Mitral Valve: The mitral valve is normal in structure. There is no evidence of mitral valve stenosis. There is normal mitral valve leaflet mobility. There is trace mitral valve regurgitation.  Tricuspid Valve: The tricuspid valve is structurally normal. There is normal tricuspid valve leaflet mobility. There is trace tricuspid regurgitation.  Pulmonic Valve: The pulmonic valve is structurally normal. There is no indication of pulmonic valve regurgitation.  Pericardium: There is no pericardial effusion noted.  Aorta: The aortic root is normal.  Pulmonary Artery: The main pulmonary artery is normal in size, and position, with normal bifurcation into the left and right pulmonary arteries.  Systemic Veins: The inferior vena cava appears to be of normal size.  In comparison to the previous echocardiogram(s):  Prior examinations are available and were reviewed for comparison purposes. The left ventricular function is unchanged. The left ventricular hypertrophy is worse. The left ventricular diastolic function is unchanged.      CONCLUSIONS:  1. Left ventricular systolic function is normal with a 55% estimated ejection fraction.  2. Spectral Doppler shows an impaired relaxation pattern of left ventricular diastolic filling.  3. There is no evidence of mitral valve stenosis.  4. Trace mitral valve regurgitation.  5. Trace tricuspid regurgitation is visualized.  6. Aortic valve stenosis is not present.  7. There is a transcatheter aortic valve replacement.  8. The main pulmonary artery is normal in size, and position, with normal bifurcation into the left and right pulmonary arteries.    RECOMMENDATIONS:  Technically suboptimal and limited study, therefore accuracy of above interpretation could be substantially diminished. Clinical correlation is advised. Consider additional imaging modalities if clinically indicated. Transthoracic echo has low negative predictive value for mass, vegetation, or embolic source. Consider JUDIT or MRI if clinically indicated.    QUANTITATIVE DATA SUMMARY:  2D MEASUREMENTS:  Normal Ranges:  Ao Root d:     3.40 cm    (2.0-3.7cm)  IVSd:          1.35 cm    (0.6-1.1cm)  LVPWd:         1.19 cm    (0.6-1.1cm)  LVIDd:         5.16 cm    (3.9-5.9cm)  LVIDs:         2.32 cm  LV Mass Index: 128.1 g/m2  LV % FS        55.0 %    LA VOLUME:  Normal Ranges:  LA Vol A4C:        66.0 ml    (22+/-6mL/m2)  LA Vol A2C:        76.9 ml  LA Vol BP:         75.2 ml  LA Vol Index A4C:  31.8ml/m2  LA Vol Index A2C:  37.0 ml/m2  LA Vol Index BP:   36.2 ml/m2  LA Area A4C:       20.1 cm2  LA Area A2C:       22.9 cm2  LA Major Axis A4C: 5.2 cm  LA Major Axis A2C: 5.8 cm  LA Volume Index:   34.6 ml/m2  LA Vol A4C:        64.0 ml  LA Vol A2C:        74.0 ml    RA VOLUME BY A/L METHOD:  Normal Ranges:  RA Vol A4C:        43.0  ml    (8.3-19.5ml)  RA Vol Index A4C:  20.7 ml/m2  RA Area A4C:       15.9 cm2  RA Major Axis A4C: 5.0 cm    LV SYSTOLIC FUNCTION BY 2D PLANIMETRY (MOD):  Normal Ranges:  EF-A4C View: 57.1 % (>=55%)  EF-A2C View: 55.0 %  EF-Biplane:  55.0 %    LV DIASTOLIC FUNCTION:  Normal Ranges:  MV Peak E:    0.80 m/s (0.7-1.2 m/s)  MV Peak A:    1.19 m/s (0.42-0.7 m/s)  E/A Ratio:    0.67     (1.0-2.2)  MV e'         0.06 m/s (>8.0)  MV lateral e' 0.07 m/s  MV medial e'  0.06 m/s  E/e' Ratio:   12.23    (<8.0)    MITRAL VALVE:  Normal Ranges:  MV DT: 230 msec (150-240msec)    AORTIC VALVE:  Normal Ranges:  AoV Vmax:                3.21 m/s  (<=1.7m/s)  AoV Peak P.2 mmHg (<20mmHg)  AoV Mean P.0 mmHg (1.7-11.5mmHg)  LVOT Max Escobar:            0.96 m/s  (<=1.1m/s)  AoV VTI:                 60.80 cm  (18-25cm)  LVOT VTI:                19.00 cm  AoV Dimensionless Index: 0.31      RIGHT VENTRICLE:  TAPSE: 26.0 mm  RV s'  0.19 m/s    TRICUSPID VALVE/RVSP:  Normal Ranges:  Peak TR Velocity: 1.91 m/s  RV Syst Pressure: 17.6 mmHg (< 30mmHg)    PULMONIC VALVE:  Normal Ranges:  PV Accel Time: 108 msec (>120ms)  PV Max Escobar:    0.8 m/s  (0.6-0.9m/s)  PV Max P.5 mmHg  PV Mean P.0 mmHg  PV VTI:        16.70 cm      Brittany Lucia DO  Electronically signed on 2024 at 3:15:00 PM      Wall Scoring        ** Final **          Radiology:     XR chest 1 view   Final Result   No acute cardiopulmonary process.        Signed by: Kenneth Luna 3/10/2025 7:36 AM   Dictation workstation:   [id number]      Cardiac Catheterization Procedure    (Results Pending)       Problem List:     Patient Active Problem List   Diagnosis    Bilateral high frequency sensorineural hearing loss    BPH (benign prostatic hyperplasia)    CAD S/P percutaneous coronary angioplasty    Chronic obstructive pulmonary disease (Multi)    Diverticulosis of large intestine without hemorrhage    HTN (hypertension), benign     GERD (gastroesophageal reflux disease)    Headache    History of MI (myocardial infarction)    Lumbar spondylosis    Mixed hyperlipidemia    S/P TAVR (transcatheter aortic valve replacement)    Xerosis of skin    Pain in toes of both feet    Onychomycosis    BPV (benign positional vertigo), unspecified laterality    Class 1 obesity with body mass index (BMI) of 32.0 to 32.9 in adult    Endolymphatic hydrops of both ears    Former smoker    Primary osteoarthritis involving multiple joints    Chest pain, unspecified type    NSTEMI (non-ST elevated myocardial infarction) (Multi)       Assessment:   NSTEMI type I  Triple-vessel CAD  Aortic stenosis status post TAVR  Hypertension  Mixed hyperlipidemia  Obesity class I  Former smoker  Osteoarthritis      Plan:   Admit to medicine.  Remains on telemetry.  Telemetry shows normal sinus rhythm.  Reviewed EKGs from the emergency department which show normal sinus rhythm with a rate of 64.  Nonspecific ST and T wave abnormalities.  There is noted new T wave inversion in inferior leads which was not seen on prior EKGs.  No STEMI criteria  Supplemental O2  Monitor electrolytes  Significantly hypertensive initially upon arrival.  Blood pressure much better now.  Resume home dose Coreg for now.  Continue aspirin statin and beta-blocker  Okay for heparin infusion for now, trend assays.  Monitor for signs of bleeding  Moderate suspicion for ACS.  Patient has known triple-vessel disease although has new chest pain symptoms with elevated troponin level.  He does have a strong history of coronary artery disease with prior stenting.  Due to current symptoms and troponin elevation along with minor EKG changes we did discuss left heart catheterization.  Patient is agreeable to proceed with left heart catheterization with Dr. Harrison.  Risk and benefits were discussed.  Check lipid panel  Further recommendations to follow post [facility]      [facility]  Adult Gerontology  Acute Care Nurse Practitioner  [facility] [place]   [facility]  [phone]      Of note, this documentation is completed using the Dragon Dictation system (voice recognition software). There may be spelling and/or grammatical errors that were not corrected prior to final submission.      Electronically signed by JONATHON Birmingham, on 3/10/2025 at 9:07 AM

## 2025-03-10 NOTE — H&P
History Of Present Illness  Emiliano Mann is a 64 y.o. male from home with a past medical history of CAD status post STEMI with PCI, AVR s/p TAVR, former smoker, HTN, HLD, statin intolerance due to myalgia, COPD, obesity, who presented with midsternal chest pain worsening family for the past 4 days.  Chest pain has been midsternal, episodic, elevating with nitroglycerin and Tylenol, associated with shortness of breath.  Patient denies abdominal pain, nausea, vomiting, diarrhea, constipation, fever, chills, cough, bleeding, urinary symptom, fall, loss of consciousness, head trauma    ED course: /109, HR 67, RR 18, febrile, O2 sat 99% room air  Labs: Troponin elevated x 2, trending down, abnormal lipid panel  EKG: NSR, nonspecific ST abnormalities  CXR: Clear  In the ER patient was started on heparin infusion, pain management, given aspirin and nitroglycerin and was admitted for the management of NSTEMI.     Past Medical History  He has a past medical history of Personal history of other diseases of the circulatory system (10/20/2022).    Surgical History  He has a past surgical history that includes Other surgical history (10/27/2020); Other surgical history (10/27/2020); Other surgical history (10/27/2020); Other surgical history (04/13/2022); Other surgical history (04/13/2022); and Other surgical history (04/13/2022).     Social History  He reports that he quit smoking about 25 years ago. His smoking use included cigarettes. He started smoking about 49 years ago. He has a 36 pack-year smoking history. He has never used smokeless tobacco. He reports that he does not currently use alcohol after a past usage of about 4.0 standard drinks of alcohol per week. He reports that he does not use drugs.    Family History  Family History   Problem Relation Name Age of Onset    Hyperlipidemia Mother      Cancer Father      Stroke Maternal Grandfather      Heart attack Maternal Grandfather          Allergies  Ezetimibe,  Lisinopril, Losartan potassium, Morphine, and Statins-hmg-coa reductase inhibitors    Review of Systems  10 points ROS is negative except as mentioned per HPI     Physical Exam    General: Well-developed obese male, in no acute distress  HEENT: AT, NC, no JVD, no lymphadenopathy, neck supple  Lungs: Clear, no wheezing, no crackles  Cardiac: Normal S1-S2, no murmur, no gallop  Abdomen: Soft, nontender, no distention, positive bowel sound  Extremities: No deformity, no edema, pulses intact, ROM intact  Neurological: Alert awake oriented x3, sensation intact, clear speech         Last Recorded Vitals  /86   Pulse 68   Temp 36.7 °C (98.1 °F) (Temporal)   Resp 14   Wt 95.3 kg (210 lb)   SpO2 94%       Assessment & Plan  Chest pain, unspecified type    CAD S/P percutaneous coronary angioplasty    HTN (hypertension), benign    Former smoker    NSTEMI (non-ST elevated myocardial infarction) (Multi)        Emiliano Mann is a 64 y.o. male from home with a past medical history of CAD status post STEMI with PCI, AVR s/p TAVR, former smoker, HTN, HLD, statin intolerance due to myalgia, COPD, obesity, who was admitted for management of following issues:      Chest pain in the setting of NSTEMI and elevated trops: Abnormal EKG on arrival with nonspecific ST abnormalities.  Will continue with heparin infusion, aspirin, nitroglycerin, Norvasc, Coreg, Plavix.  Cardiology consulted.  Status post left heart cath.    LHC: 95-99% stenosis of the proximal RCA with thrombus, chronic occlusion of the circumflex artery, 90% stenosis in the distal OM 2, 50% stenosis in the ostial diagonal artery, 40% stenosis in the distal LAD. Patient underwent successful PCI with 2 drug-eluting stents placed to the proximal RCA reducing that lesion down to 0% stenosis.   Will continue with home meds further comorbidities  VTE prophylaxis: SCDs  Disposition: Home once hemodynamically stable hopefully within the next 24 hours  No need for a.m.  chip Scott MD

## 2025-03-10 NOTE — POST-PROCEDURE NOTE
Physician Transition of Care Summary  Invasive Cardiovascular Lab    Procedure Date: 3/10/2025  Attending:    * Antoine Harrison - Primary  Resident/Fellow/Other Assistant: Surgeons and Role:  * No surgeons found with a matching role *    Pre Procedure Diagnosis:   CAD, remote multivessel PCI, remote TAVR, NSTEMI    Post Procedure Diagnosis:   FROYLAN 99% proximal dominant RCA    Complications:   None    Stents/Implants:   FROYLAN of RCA    Anticoagulation/Antiplatelet Plan:   Aspirin 81 mg a day, Plavix 75 mg a day, no heparin    Estimated Blood Loss:   0 mL    Electronically signed by: Antoine Harrison MD, 3/10/2025 11:37 AM    Anesthesia: Moderate                            anesthesia Staff: None

## 2025-03-10 NOTE — Clinical Note
Patient Clipped and Prepped: right groin. Prepped with ChloraPrep, a minimum of 3 minute dry time, longer if needed, no pooling noted, patient draped in sterile fashion. Provider at bedside       Alta Adrian RN  10/31/19 [id number]

## 2025-03-10 NOTE — Clinical Note
Angioplasty of the proximal right coronary artery lesion. Inflation 1: Pressure = 10 bernadine; Duration = 30 sec.

## 2025-03-10 NOTE — PROGRESS NOTES
Emergency Medicine Transition of Care Note    I received Emiliano Mann in signout from Dr. Combs.  Please see the previous ED provider note for all HPI, PE and MDM up to the time of signout at 0700. This is in addition to the primary record.    In brief Emiliano Mann is an 64 y.o. male presenting for   Chief Complaint   Patient presents with    Chest Pain     Pt states chest pain started Friday 3/7/25.      At the time of signout we were awaiting: workup    Diagnoses as of 03/10/25 0833   Chest pain, unspecified type   NSTEMI (non-ST elevated myocardial infarction) (Multi)       Medical Decision Making    Labs Reviewed   SERIAL TROPONIN-INITIAL - Abnormal       Result Value    Troponin I, High Sensitivity 439 (*)     Narrative:     Less than 99th percentile of normal range cutoff-  Female and children under 18 years old <14 ng/L; Male <21 ng/L: Negative  Repeat testing should be performed if clinically indicated.     Female and children under 18 years old 14-50 ng/L; Male 21-50 ng/L:  Consistent with possible cardiac damage and possible increased clinical   risk. Serial measurements may help to assess extent of myocardial damage.     >50 ng/L: Consistent with cardiac damage, increased clinical risk and  myocardial infarction. Serial measurements may help assess extent of   myocardial damage.      NOTE: Children less than 1 year old may have higher baseline troponin   levels and results should be interpreted in conjunction with the overall   clinical context.     NOTE: Troponin I testing is performed using a different   testing methodology at [facility] than at other   [facility]. Direct result comparisons should only   be made within the same method.   SERIAL TROPONIN, 1 HOUR - Abnormal    Troponin I, High Sensitivity 423 (*)     Narrative:     Less than 99th percentile of normal range cutoff-  Female and children under 18 years old <14 ng/L; Male <21 ng/L: Negative  Repeat testing should be  performed if clinically indicated.     Female and children under 18 years old 14-50 ng/L; Male 21-50 ng/L:  Consistent with possible cardiac damage and possible increased clinical   risk. Serial measurements may help to assess extent of myocardial damage.     >50 ng/L: Consistent with cardiac damage, increased clinical risk and  myocardial infarction. Serial measurements may help assess extent of   myocardial damage.      NOTE: Children less than 1 year old may have higher baseline troponin   levels and results should be interpreted in conjunction with the overall   clinical context.     NOTE: Troponin I testing is performed using a different   testing methodology at [facility] than at other   [facility]. Direct result comparisons should only   be made within the same method.   COMPREHENSIVE METABOLIC PANEL - Normal    Glucose 98      Sodium 137      Potassium 3.8      Chloride 104      Bicarbonate 24      Anion Gap 13      Urea Nitrogen 14      Creatinine 1.04      eGFR 79      Calcium 9.2      Albumin 4.3      Alkaline Phosphatase 53      Total Protein 7.0      AST 16      Bilirubin, Total 0.6      ALT 18     MAGNESIUM - Normal    Magnesium 2.03     LACTATE - Normal    Lactate 1.8      Narrative:     Venipuncture immediately after or during the administration of Metamizole may lead to falsely low results. Testing should be performed immediately prior to Metamizole dosing.   PROTIME-INR - Normal    Protime 10.6      INR 1.0     B-TYPE NATRIURETIC PEPTIDE - Normal    BNP 72      Narrative:        <100 pg/mL - Heart failure unlikely  100-299 pg/mL - Intermediate probability of acute heart                  failure exacerbation. Correlate with clinical                  context and patient history.    >=300 pg/mL - Heart Failure likely. Correlate with clinical                  context and patient history.    BNP testing is performed using different testing methodology at [facility] than  at other system hospitals. Direct result comparisons should only be made within the same method.      APTT - Normal    aPTT 31      Narrative:     The APTT is no longer used for monitoring Unfractionated Heparin Therapy. For monitoring Heparin Therapy, use the Heparin Assay.   TROPONIN SERIES- (INITIAL, 1 HR)    Narrative:     The following orders were created for panel order Troponin I Series, High Sensitivity (0, 1 HR).  Procedure                               Abnormality         Status                     ---------                               -----------         ------                     Troponin I, High Sensiti...[[id number]]  Abnormal            Final result               Troponin, High Sensitivi...[[id number]]  Abnormal            Final result                 Please view results for these tests on the individual orders.   CBC WITH AUTO DIFFERENTIAL    WBC 9.2      nRBC 0.0      RBC 5.02      Hemoglobin 15.7      Hematocrit 44.5      MCV 89      MCH 31.3      MCHC 35.3      RDW 11.9      Platelets 254      Neutrophils % 59.5      Immature Granulocytes %, Automated 0.5      Lymphocytes % 27.2      Monocytes % 10.2      Eosinophils % 2.2      Basophils % 0.4      Neutrophils Absolute 5.49      Immature Granulocytes Absolute, Automated 0.05      Lymphocytes Absolute 2.51      Monocytes Absolute 0.94      Eosinophils Absolute 0.20      Basophils Absolute 0.04         XR chest 1 view   Final Result   No acute cardiopulmonary process.        Signed by: Kenneth Luna 3/10/2025 7:36 AM   Dictation workstation:   [id number]        My interpretation of EKG is normal sinus rhythm at 64 bpm with nonspecific ST-T changes    I reviewed the patient's cardiology report for his heart catheterization from August 12, 2022 which showed the following:    CONCLUSIONS:   1. Mildly decreased cardiac output.   2. Distal Left Main: 10% stenosis.   3. Left Anterior Descending Artery: contains patent previously placed stents.   4. Mid  LAD Lesion: The percent stenosis is 50%.   5. Prox 2nd Diag LAD Lesion: The percent stenosis is 50-70%.   6. Mid CX Lesion: The percent stenosis is 100%.   7. Mid CX Lesion: Instent Restenosis.   8. Mid 2nd OM CX Lesion: The percent stenosis is 80% .   9. Right Coronary Artery: contains patent previously placed stents.  10. Proximal RCA Lesion: The percent stenosis is 50%.  11. The Left Ventricular Ejection Fraction is 60%,by echo.  12. Aortic Stenosis: Severe, by echo.  13. Normal filling pressures.  14. Pulmonary arterial hypertension, mild.    Diagnostic evaluation was completed.  Initial troponin was elevated at 439.  This is concerning for possible non-ST elevation MI.  Repeat troponin was downtrending at 423.  INR is 1.0.  BNP is in the normal range so I do not suspect CHF.  Metabolic panel shows normal glucose.  Sodium potassium in the normal range.  Renal and liver function are in the normal range.  Lactate is in the normal range.  CBC shows normal white blood cell count and no evidence of anemia.  Platelets are in the normal range.  Chest x-ray shows no acute cardiopulmonary process.    My interpretation of second EKG is sinus bradycardia 56 bpm with nonspecific ST-T changes    The patient was given aspirin and nitroglycerin.  In addition he was started on a heparin drip.    Heart score is 9.    The patient will require hospitalization for further workup and evaluation.    I have reviewed the patient's history, physical exam, and test information with the consultant,  Dr. Phillip (cardiology)                 , who agrees to care for the patient.  He will plan on taking the patient to the cardiac catheterization lab later today.    The patient's condition requires ongoing treatment and evaluation necessitating hospital admission.  I have reviewed the patient's history, physical exam, and test information with the admitting physician,   Dr. Scott                , who agrees to hospitalize the patient.      I discussed the results and plan for hospitalization with the patient and/or family/friend if present.  Questions were addressed.  Patient and/or family/friend expressed understanding.    Final diagnoses:   [R07.9] Chest pain, unspecified type   [I21.4] NSTEMI (non-ST elevated myocardial infarction) (Multi)           Procedure  Procedures    Kory Pérez MD    L LE/weight-bearing as tolerated

## 2025-03-10 NOTE — SIGNIFICANT EVENT
Reviewed wound care and discussed plavix with pt, will need reviewed on discharge, information on medications, wound care and cardiac rehab given in folder for later review

## 2025-03-10 NOTE — CONSULTS
Consults                                                      Date:   3/10/2025  Patient name:  Emiliano Mann  Date of admission:  3/10/2025  6:38 AM  MRN:   [id number]  YOB: 1960  Time of Consult:  9:07 AM    Consulting Cardiologist/ADALBERTO:   KVNG Yi CNP  Primary Cardiologist:  Dr. Antoine Harrison    Referring Provider: Dr. Scott      Admission Diagnosis:     Chest pain, unspecified type      History of Present Illness:      64-year-old male with past medical history of CAD status post remote STEMI with PCI, aortic valve stenosis status post TAVR, former smoker, hypertension, mixed hyperlipidemia, statin intolerance, COPD, obesity who presented to [facility] [place] emergency department with complaints of midsternal chest pressure that initially started Friday but has been off and on with frequency.  He apparently took couple nitro on Friday and over the weekend just to get by however chest pain continues to come back.  He also endorses some mild shortness of breath.  He denies any swelling in his legs.  Denies any calf pain or tenderness.  Denies any palpitations, nausea or vomiting.  His normal cardiologist is Dr. Harrison.  He has a history of 6 cardiac stents in the past.  Emergency department he was noted to be significantly hypertensive.  Lab work was unremarkable besides troponin elevation of 439 and second one was 423.  Chest x-ray was unremarkable.  Initial EKG showed normal sinus rhythm with a heart rate of 64.  Nonspecific ST and T wave abnormalities.  No STEMI criteria.  Some noted T wave inversions in the inferior leads which is new.  No STEMI criteria.  The patient was admitted to the medicine service and general cardiology was consulted for chest pain.    Previous Cardiac Testing:    Echocardiogram:   Recent Labs     02/27/24  1428   EF 55   LVIDD 5.16   RV 17.6   RVFRWALLPKSP 19.40   TAPSE 2.6   Transthoracic Echo (TTE) Complete  02/27/2024    [place] [address]  Tel [phone] Fax [phone]    TRANSTHORACIC ECHOCARDIOGRAM REPORT      Patient Name:      PRATIK NEWTON MICHELLE Spear Physician:    [id number] Mikey Lucia DO  Study Date:        2/27/2024            Ordering Provider:    [id number] RADHA GONZÁLEZ  BELKIS  MRN/PID:           [id number]             Fellow:  Accession#:        [id number]         Nurse:                Chiki Gutierrez RN  Date of Birth/Age: 1960 / 63 years Sonographer:          Ena Pacheco RDMARY ELLEN  Gender:            M                    Additional Staff:  Height:            172.72 cm            Admit Date:           2/27/2024  Weight:            94.35 kg             Admission Status:     Inpatient -  Routine  BSA / BMI:         2.08 m2 / 31.63      Department Location:  [place] [address]                                      Echo Lab  Blood Pressure: 134 /85 mmHg    Study Type:    TRANSTHORACIC ECHO (TTE) COMPLETE  Diagnosis/ICD: Transient cerebral ischemic attack, unspecified (NOT on  LCD)-G45.9; Other transient cerebral ischemic attacks and related  syndromes-G45.8  Indication:    TIA/CVA; Transient Monocular Blindness (Left); Hx TAVR  CPT Codes:     Echo Complete w Full Doppler-00781    Patient History:  Valve Disorders:   Aortic Valve Replacement.  Pertinent History: CAD, HTN, Hyperlipidemia and Hx MI, Transient Monocular  Blindness - Left Eye; S/P TAVR (#26 De La Torre Earline 3), PCIs.  S/P TAVR.    Study Detail: The following Echo studies were performed: 2D, M-Mode, Doppler and  color flow. Technically challenging study due to prominent lung  artifact. Agitated saline used as a contrast agent for intraseptal  flow evaluation.      PHYSICIAN INTERPRETATION:  Left Ventricle: Left ventricular systolic function is normal, with an estimated ejection fraction of 55%. There are no regional wall motion abnormalities. The left ventricular cavity size is normal. There is  mild to moderate concentric left ventricular hypertrophy. Spectral Doppler shows an impaired relaxation pattern of left ventricular diastolic filling.  LV Wall Scoring:  All segments are normal.    Left Atrium: The left atrium is mildly dilated. A bubble study using agitated saline was performed. Bubble study is negative.  Right Ventricle: The right ventricle is normal in size. There is normal right ventricular global systolic function.  Right Atrium: The right atrium is normal in size.  Aortic Valve: There is a prosthetic aortic valve present. There is no evidence of aortic valve stenosis.  There is an De La Torre transcatheter aortic valve replacement, with a 26 mm reported size. Echo findings are consistent with normal aortic valve prosthesis structure and function. There is no evidence of aortic valve regurgitation. The peak instantaneous gradient of the aortic valve is 41.2 mmHg. The mean gradient of the aortic valve is 18.0 mmHg.  Mitral Valve: The mitral valve is normal in structure. There is no evidence of mitral valve stenosis. There is normal mitral valve leaflet mobility. There is trace mitral valve regurgitation.  Tricuspid Valve: The tricuspid valve is structurally normal. There is normal tricuspid valve leaflet mobility. There is trace tricuspid regurgitation.  Pulmonic Valve: The pulmonic valve is structurally normal. There is no indication of pulmonic valve regurgitation.  Pericardium: There is no pericardial effusion noted.  Aorta: The aortic root is normal.  Pulmonary Artery: The main pulmonary artery is normal in size, and position, with normal bifurcation into the left and right pulmonary arteries.  Systemic Veins: The inferior vena cava appears to be of normal size.  In comparison to the previous echocardiogram(s): Prior examinations are available and were reviewed for comparison purposes. The left ventricular function is unchanged. The left ventricular hypertrophy is worse. The left ventricular  diastolic function is unchanged.      CONCLUSIONS:  1. Left ventricular systolic function is normal with a 55% estimated ejection fraction.  2. Spectral Doppler shows an impaired relaxation pattern of left ventricular diastolic filling.  3. There is no evidence of mitral valve stenosis.  4. Trace mitral valve regurgitation.  5. Trace tricuspid regurgitation is visualized.  6. Aortic valve stenosis is not present.  7. There is a transcatheter aortic valve replacement.  8. The main pulmonary artery is normal in size, and position, with normal bifurcation into the left and right pulmonary arteries.    RECOMMENDATIONS:  Technically suboptimal and limited study, therefore accuracy of above interpretation could be substantially diminished. Clinical correlation is advised. Consider additional imaging modalities if clinically indicated. Transthoracic echo has low negative predictive value for mass, vegetation, or embolic source. Consider JUDIT or MRI if clinically indicated.    QUANTITATIVE DATA SUMMARY:  2D MEASUREMENTS:  Normal Ranges:  Ao Root d:     3.40 cm    (2.0-3.7cm)  IVSd:          1.35 cm    (0.6-1.1cm)  LVPWd:         1.19 cm    (0.6-1.1cm)  LVIDd:         5.16 cm    (3.9-5.9cm)  LVIDs:         2.32 cm  LV Mass Index: 128.1 g/m2  LV % FS        55.0 %    LA VOLUME:  Normal Ranges:  LA Vol A4C:        66.0 ml    (22+/-6mL/m2)  LA Vol A2C:        76.9 ml  LA Vol BP:         75.2 ml  LA Vol Index A4C:  31.8ml/m2  LA Vol Index A2C:  37.0 ml/m2  LA Vol Index BP:   36.2 ml/m2  LA Area A4C:       20.1 cm2  LA Area A2C:       22.9 cm2  LA Major Axis A4C: 5.2 cm  LA Major Axis A2C: 5.8 cm  LA Volume Index:   34.6 ml/m2  LA Vol A4C:        64.0 ml  LA Vol A2C:        74.0 ml    RA VOLUME BY A/L METHOD:  Normal Ranges:  RA Vol A4C:        43.0 ml    (8.3-19.5ml)  RA Vol Index A4C:  20.7 ml/m2  RA Area A4C:       15.9 cm2  RA Major Axis A4C: 5.0 cm    LV SYSTOLIC FUNCTION BY 2D PLANIMETRY (MOD):  Normal Ranges:  EF-A4C View:  57.1 % (>=55%)  EF-A2C View: 55.0 %  EF-Biplane:  55.0 %    LV DIASTOLIC FUNCTION:  Normal Ranges:  MV Peak E:    0.80 m/s (0.7-1.2 m/s)  MV Peak A:    1.19 m/s (0.42-0.7 m/s)  E/A Ratio:    0.67     (1.0-2.2)  MV e'         0.06 m/s (>8.0)  MV lateral e' 0.07 m/s  MV medial e'  0.06 m/s  E/e' Ratio:   12.23    (<8.0)    MITRAL VALVE:  Normal Ranges:  MV DT: 230 msec (150-240msec)    AORTIC VALVE:  Normal Ranges:  AoV Vmax:                3.21 m/s  (<=1.7m/s)  AoV Peak P.2 mmHg (<20mmHg)  AoV Mean P.0 mmHg (1.7-11.5mmHg)  LVOT Max Escobar:            0.96 m/s  (<=1.1m/s)  AoV VTI:                 60.80 cm  (18-25cm)  LVOT VTI:                19.00 cm  AoV Dimensionless Index: 0.31      RIGHT VENTRICLE:  TAPSE: 26.0 mm  RV s'  0.19 m/s    TRICUSPID VALVE/RVSP:  Normal Ranges:  Peak TR Velocity: 1.91 m/s  RV Syst Pressure: 17.6 mmHg (< 30mmHg)    PULMONIC VALVE:  Normal Ranges:  PV Accel Time: 108 msec (>120ms)  PV Max Escobar:    0.8 m/s  (0.6-0.9m/s)  PV Max P.5 mmHg  PV Mean P.0 mmHg  PV VTI:        16.70 cm      [id number] Mikey Lucia DO  Electronically signed on 2024 at 3:15:00 PM      Wall Scoring        ** Final **    Coronary Angiography:   Adult Cath     Narrative  [facility], Cath Lab  [address] [place] [address]    Cardiovascular Catheterization Report    Patient Name:     PRATIK Simmons           [id number] Antoine MARTINEZ         Physician:           MD  Study Date:       2022    Verifying Physician: [id number] Tan MD  MRN/PID:          [id number]     Cardiologist:  Accession/Order#: [id number]    Referring Physician: [id number] ANTOINE TAN  YOB: 1960    Referring Physician: [id number] Antoine Tan MD  Gender:           M            Referring Physician:      Study: Left Heart Catheterization      Indications:  PRATIK MARTINEZ is a 62 year old male who presents with dyslipidemia,  hypertension, prior percutaneous coronary intervention, tobacco Use - former and chronic pulmonary disease. Valvular disease, with a chest pain assessment of atypical angina. Study performed as an elective cath procedure.    Medical History:  Stress test performed: No. CTA performed: No. Lillian accessed: No. LVEF Assessed: No.    Procedure Description:  After infiltration with 2% Lidocaine, the right femoral artery was cannulated with a modified Seldinger technique. Subsequently a 5 Greenlandic sheath was placed in the right femoral artery. After infiltration of local anesthetic, the right femoral vein was cannulated with a percutaneous technique. A 7 Greenlandic sheath was placed in the vein. Selective coronary catheterization was performed using a 5 Fr catheter(s) exchanged over a guide wire to cannulate the coronary arteries. A JL 4 tip catheter was used for left coronary injections. A 3DRC tip catheter was used for right coronary injections.  Multiple injections of contrast were made into the left and right coronary arteries with angiograms recorded in multiple projections. A balloon tipped catheter was advanced through the right heart to record pressures. Cardiac output was calculated via the Steven method. After completion of the procedure, femoral artery angiography was performed. This demonstrated a common femoral artery puncture appropriate for closure. A Vascade 5F vascular closure Device was placed per protocol. Post-procedure, the venous sheath was pulled and pressure was applied to the site.    Coronary Angiography:  The coronary circulation is right dominant.    Left Main Coronary Artery:  There is 10% stenosis in the distal left main coronary artery.    Left Anterior Descending Coronary Artery Distribution:  There is 50% stenosis in the mid left anterior descending artery. Contains patent previously placed stents. There is 50-70% stenosis in the Prox 2nd Diag left anterior descending artery.    Circumflex  Coronary Artery Distribution:  There is 100% stenosis in the the mid Circumflex artery. There is evidence of instent restenosis in this segment. There is 80% stenosis in the Mid 2nd OM Circumflex artery.    Right Heart Catheterization:  A balloon tipped catheter was advanced through the right heart to record pressures. Cardiac output was calculated via the Steven method. Normal filling pressures. Cardiac output is mildly decreased. Pulmonary arterial hypertension.    Right Coronary Artery Distribution:    Contains patent previously placed stents. There is 50% stenosis in the the proximal Right Coronary Artery.      Left Ventriculography:  The estimated left ventricular ejection fraction is normal at 60%. There is severe aortic stenosis noted.      Cardiac Cath Transition of Care Summary:  Post Procedure Diagnosis: Triple vessel disease.  Blood Loss:               Estimated blood loss during the procedure was 0 mls.  Specimens Removed:        Number of specimen(s) removed: none.    ____________________________________________________________________________________  CONCLUSIONS:  1. Mildly decreased cardiac output.  2. Distal Left Main: 10% stenosis.  3. Left Anterior Descending Artery: contains patent previously placed stents.  4. Mid LAD Lesion: The percent stenosis is 50%.  5. Prox 2nd Diag LAD Lesion: The percent stenosis is 50-70%.  6. Mid CX Lesion: The percent stenosis is 100%.  7. Mid CX Lesion: Instent Restenosis.  8. Mid 2nd OM CX Lesion: The percent stenosis is 80% .  9. Right Coronary Artery: contains patent previously placed stents.  10. Proximal RCA Lesion: The percent stenosis is 50%.  11. The Left Ventricular Ejection Fraction is 60%,by echo.  12. Aortic Stenosis: Severe, by echo.  13. Normal filling pressures.  14. Pulmonary arterial hypertension, mild.    ____________________________________________________________________________________  CPT Codes:  Coronary Angiography S&I only ([facility])-58994;  Moderate Sedation Services initial 15 minutes patient >5 years-05232; Right Heart Cath O2/Cardiac output without biopsy ([facility])-11446    ICD 10 Codes:  I25.119-Atherosclerotic heart disease of native coronary artery with unspecified angina pectoris; I35.0-Nonrheumatic aortic (valve) stenosis    [id number] Antoine Tan MD  Performing Physician  Electronically signed by [id number] Antoine Tan MD on 8/12/2022 at 9:14:13  AM      cc Report to: [id number] ANTOINE TAN    cc Report to: [id number] Antoine Tan MD           Final     Nuclear:No results found for this or any previous visit from the past 1800 days.          Allergies:     Allergies   Allergen Reactions    Ezetimibe Myalgia    Lisinopril Cough    Losartan Potassium Dizziness     Patient had vertigo    Morphine Nausea/vomiting    Statins-Hmg-Coa Reductase Inhibitors Myalgia     Leg, thighs cramps  Hx of pravastatin, simvastatin         Past Medical History:     Past Medical History:   Diagnosis Date    Personal history of other diseases of the circulatory system 10/20/2022    History of aortic valve stenosis       Past Surgical History:     Past Surgical History:   Procedure Laterality Date    OTHER SURGICAL HISTORY  10/27/2020    Cholecystectomy    OTHER SURGICAL HISTORY  10/27/2020    Cervical laminectomy    OTHER SURGICAL HISTORY  10/27/2020    Colonoscopy    OTHER SURGICAL HISTORY  04/13/2022    Percutaneous transluminal coronary angioplasty    OTHER SURGICAL HISTORY  04/13/2022    Cardiac catheterization with stent placement    OTHER SURGICAL HISTORY  04/13/2022    Tonsillectomy with adenoidectomy       Family History:     Family History   Problem Relation Name Age of Onset    Hyperlipidemia Mother      Cancer Father      Stroke Maternal Grandfather      Heart attack Maternal Grandfather         Social History:     Social History     Tobacco Use    Smoking status: Former     Current packs/day: 0.00     Average packs/day: 1.5 packs/day for  "24.0 years (36.0 ttl pk-yrs)     Types: Cigarettes     Start date:      Quit date:      Years since quittin.2    Smokeless tobacco: Never   Substance Use Topics    Alcohol use: Not Currently     Alcohol/week: 4.0 standard drinks of alcohol     Types: 4 Glasses of wine per week    Drug use: Never       CURRENT INPATIENT MEDICATIONS    carvedilol, 25 mg, oral, BID      heparin, 0-4,000 Units/hr, Last Rate: 1,000 Units/hr (03/10/25 0737)      Current Outpatient Medications   Medication Instructions    amLODIPine (NORVASC) 10 mg, oral, Daily    ASCORBIC ACID, VITAMIN C, ORAL 282 mg, Daily    aspirin 81 mg, 3 times weekly    calcium carbonate (TUMS EXTRA STRENGTH) 600 mg, Nightly PRN    carvedilol (COREG) 25 mg, oral, 2 times daily    cholecalciferol (VITAMIN D-3) 5,000 Units, Daily    ezetimibe (ZETIA) 10 mg, oral, Daily    famotidine (PEPCID) 20 mg, Daily PRN    methylPREDNISolone (Medrol Dospak) 4 mg tablets Follow schedule on package instructions    methylPREDNISolone (Medrol Dospak) 4 mg tablets Take as directed on package.    multivitamin tablet 1 tablet, 3 times weekly    nitroglycerin (NITROSTAT) 0.4 mg, Every 5 min PRN    omega 3-dha-epa-fish oil (Fish OiL) 1,200 (144-216) mg capsule 1 capsule, Daily    omeprazole OTC (PRILOSEC OTC) 20 mg, Daily PRN    vitamin B complex tablet 1 tablet, Daily        Review of Systems:      12 point review of systems was obtained in detail and is negative other than that detailed above.    Vital Signs:     Vitals:    03/10/25 0649 03/10/25 0724 03/10/25 0733 03/10/25 0746   BP: (!) 180/109 (!) 194/123 (!) 173/93 147/89   BP Location: Right arm      Patient Position: Sitting      Pulse: 67  71 62   Resp: 18  18    Temp: 36.7 °C (98.1 °F)      TempSrc: Temporal      SpO2: 99%  96%    Weight: 95.3 kg (210 lb)      Height: 1.727 m (5' 8\")        No intake or output data in the 24 hours ending 03/10/25 0907    Wt Readings from Last 4 Encounters:   03/10/25 95.3 kg (210 " lb)   01/17/25 90.7 kg (200 lb)   11/11/24 93 kg (205 lb)   10/28/24 97.9 kg (215 lb 14.4 oz)       Physical Examination:     Physical Exam  Vitals and nursing note reviewed.   HENT:      Head: Normocephalic.      Mouth/Throat:      Mouth: Mucous membranes are moist.   Eyes:      Pupils: Pupils are equal, round, and reactive to light.   Cardiovascular:      Rate and Rhythm: Normal rate and regular rhythm.   Pulmonary:      Effort: Pulmonary effort is normal.   Abdominal:      General: Bowel sounds are normal.      Palpations: Abdomen is soft.   Musculoskeletal:         General: Normal range of motion.   Skin:     General: Skin is warm and dry.      Capillary Refill: Capillary refill takes less than 2 seconds.   Neurological:      Mental Status: He is alert and oriented to person, place, and time.   Psychiatric:         Mood and Affect: Mood normal.           Lab:     CBC:   Results from last 7 days   Lab Units 03/10/25  0652   WBC AUTO x10*3/uL 9.2   RBC AUTO x10*6/uL 5.02   HEMOGLOBIN g/dL 15.7   HEMATOCRIT % 44.5   MCV fL 89   MCH pg 31.3   MCHC g/dL 35.3   RDW % 11.9   PLATELETS AUTO x10*3/uL 254     CMP:    Results from last 7 days   Lab Units 03/10/25  0652   SODIUM mmol/L 137   POTASSIUM mmol/L 3.8   CHLORIDE mmol/L 104   CO2 mmol/L 24   BUN mg/dL 14   CREATININE mg/dL 1.04   GLUCOSE mg/dL 98   PROTEIN TOTAL g/dL 7.0   CALCIUM mg/dL 9.2   BILIRUBIN TOTAL mg/dL 0.6   ALK PHOS U/L 53   AST U/L 16   ALT U/L 18     BMP:    Results from last 7 days   Lab Units 03/10/25  0652   SODIUM mmol/L 137   POTASSIUM mmol/L 3.8   CHLORIDE mmol/L 104   CO2 mmol/L 24   BUN mg/dL 14   CREATININE mg/dL 1.04   CALCIUM mg/dL 9.2   GLUCOSE mg/dL 98     Magnesium:  Results from last 7 days   Lab Units 03/10/25  0652   MAGNESIUM mg/dL 2.03     Troponin:    Results from last 7 days   Lab Units 03/10/25  0752 03/10/25  0652   TROPHS ng/L 423* 439*     BNP:   Results from last 7 days   Lab Units 03/10/25  0652   BNP pg/mL 72     Lipid  Panel:         Diagnostic Studies:   @No results found for this or any previous visit.    Results for orders placed during the hospital encounter of 02/27/24    Transthoracic Echo (TTE) Complete    Narrative  [address] [address]  Tel [phone] Fax [phone]    TRANSTHORACIC ECHOCARDIOGRAM REPORT      Patient Name:      PRATIK NEWTON MICHELLE Spear Physician:    [id number] Mikey Lucia DO  Study Date:        2/27/2024            Ordering Provider:    [id number] RADHA TAAMYO  MRN/PID:           [id number]             Fellow:  Accession#:        [id number]         Nurse:                Chiki Gutierrez RN  Date of Birth/Age: 1960 / 63 years Sonographer:          Ena Pacheco RDCS  Gender:            M                    Additional Staff:  Height:            172.72 cm            Admit Date:           2/27/2024  Weight:            94.35 kg             Admission Status:     Inpatient -  Routine  BSA / BMI:         2.08 m2 / 31.63      Department Location:  [place] [address]                                      Echo Lab  Blood Pressure: 134 /85 mmHg    Study Type:    TRANSTHORACIC ECHO (TTE) COMPLETE  Diagnosis/ICD: Transient cerebral ischemic attack, unspecified (NOT on  LCD)-G45.9; Other transient cerebral ischemic attacks and related  syndromes-G45.8  Indication:    TIA/CVA; Transient Monocular Blindness (Left); Hx TAVR  CPT Codes:     Echo Complete w Full Doppler-82324    Patient History:  Valve Disorders:   Aortic Valve Replacement.  Pertinent History: CAD, HTN, Hyperlipidemia and Hx MI, Transient Monocular  Blindness - Left Eye; S/P TAVR (#26 De La Torre Earline 3), PCIs.  S/P TAVR.    Study Detail: The following Echo studies were performed: 2D, M-Mode, Doppler and  color flow. Technically challenging study due to prominent lung  artifact. Agitated saline used as a contrast agent for intraseptal  flow evaluation.      PHYSICIAN INTERPRETATION:  Left Ventricle: Left  ventricular systolic function is normal, with an estimated ejection fraction of 55%. There are no regional wall motion abnormalities. The left ventricular cavity size is normal. There is mild to moderate concentric left ventricular hypertrophy. Spectral Doppler shows an impaired relaxation pattern of left ventricular diastolic filling.  LV Wall Scoring:  All segments are normal.    Left Atrium: The left atrium is mildly dilated. A bubble study using agitated saline was performed. Bubble study is negative.  Right Ventricle: The right ventricle is normal in size. There is normal right ventricular global systolic function.  Right Atrium: The right atrium is normal in size.  Aortic Valve: There is a prosthetic aortic valve present. There is no evidence of aortic valve stenosis.  There is an De La Torre transcatheter aortic valve replacement, with a 26 mm reported size. Echo findings are consistent with normal aortic valve prosthesis structure and function. There is no evidence of aortic valve regurgitation. The peak instantaneous gradient of the aortic valve is 41.2 mmHg. The mean gradient of the aortic valve is 18.0 mmHg.  Mitral Valve: The mitral valve is normal in structure. There is no evidence of mitral valve stenosis. There is normal mitral valve leaflet mobility. There is trace mitral valve regurgitation.  Tricuspid Valve: The tricuspid valve is structurally normal. There is normal tricuspid valve leaflet mobility. There is trace tricuspid regurgitation.  Pulmonic Valve: The pulmonic valve is structurally normal. There is no indication of pulmonic valve regurgitation.  Pericardium: There is no pericardial effusion noted.  Aorta: The aortic root is normal.  Pulmonary Artery: The main pulmonary artery is normal in size, and position, with normal bifurcation into the left and right pulmonary arteries.  Systemic Veins: The inferior vena cava appears to be of normal size.  In comparison to the previous echocardiogram(s):  Prior examinations are available and were reviewed for comparison purposes. The left ventricular function is unchanged. The left ventricular hypertrophy is worse. The left ventricular diastolic function is unchanged.      CONCLUSIONS:  1. Left ventricular systolic function is normal with a 55% estimated ejection fraction.  2. Spectral Doppler shows an impaired relaxation pattern of left ventricular diastolic filling.  3. There is no evidence of mitral valve stenosis.  4. Trace mitral valve regurgitation.  5. Trace tricuspid regurgitation is visualized.  6. Aortic valve stenosis is not present.  7. There is a transcatheter aortic valve replacement.  8. The main pulmonary artery is normal in size, and position, with normal bifurcation into the left and right pulmonary arteries.    RECOMMENDATIONS:  Technically suboptimal and limited study, therefore accuracy of above interpretation could be substantially diminished. Clinical correlation is advised. Consider additional imaging modalities if clinically indicated. Transthoracic echo has low negative predictive value for mass, vegetation, or embolic source. Consider JUDIT or MRI if clinically indicated.    QUANTITATIVE DATA SUMMARY:  2D MEASUREMENTS:  Normal Ranges:  Ao Root d:     3.40 cm    (2.0-3.7cm)  IVSd:          1.35 cm    (0.6-1.1cm)  LVPWd:         1.19 cm    (0.6-1.1cm)  LVIDd:         5.16 cm    (3.9-5.9cm)  LVIDs:         2.32 cm  LV Mass Index: 128.1 g/m2  LV % FS        55.0 %    LA VOLUME:  Normal Ranges:  LA Vol A4C:        66.0 ml    (22+/-6mL/m2)  LA Vol A2C:        76.9 ml  LA Vol BP:         75.2 ml  LA Vol Index A4C:  31.8ml/m2  LA Vol Index A2C:  37.0 ml/m2  LA Vol Index BP:   36.2 ml/m2  LA Area A4C:       20.1 cm2  LA Area A2C:       22.9 cm2  LA Major Axis A4C: 5.2 cm  LA Major Axis A2C: 5.8 cm  LA Volume Index:   34.6 ml/m2  LA Vol A4C:        64.0 ml  LA Vol A2C:        74.0 ml    RA VOLUME BY A/L METHOD:  Normal Ranges:  RA Vol A4C:        43.0  ml    (8.3-19.5ml)  RA Vol Index A4C:  20.7 ml/m2  RA Area A4C:       15.9 cm2  RA Major Axis A4C: 5.0 cm    LV SYSTOLIC FUNCTION BY 2D PLANIMETRY (MOD):  Normal Ranges:  EF-A4C View: 57.1 % (>=55%)  EF-A2C View: 55.0 %  EF-Biplane:  55.0 %    LV DIASTOLIC FUNCTION:  Normal Ranges:  MV Peak E:    0.80 m/s (0.7-1.2 m/s)  MV Peak A:    1.19 m/s (0.42-0.7 m/s)  E/A Ratio:    0.67     (1.0-2.2)  MV e'         0.06 m/s (>8.0)  MV lateral e' 0.07 m/s  MV medial e'  0.06 m/s  E/e' Ratio:   12.23    (<8.0)    MITRAL VALVE:  Normal Ranges:  MV DT: 230 msec (150-240msec)    AORTIC VALVE:  Normal Ranges:  AoV Vmax:                3.21 m/s  (<=1.7m/s)  AoV Peak P.2 mmHg (<20mmHg)  AoV Mean P.0 mmHg (1.7-11.5mmHg)  LVOT Max Escoabr:            0.96 m/s  (<=1.1m/s)  AoV VTI:                 60.80 cm  (18-25cm)  LVOT VTI:                19.00 cm  AoV Dimensionless Index: 0.31      RIGHT VENTRICLE:  TAPSE: 26.0 mm  RV s'  0.19 m/s    TRICUSPID VALVE/RVSP:  Normal Ranges:  Peak TR Velocity: 1.91 m/s  RV Syst Pressure: 17.6 mmHg (< 30mmHg)    PULMONIC VALVE:  Normal Ranges:  PV Accel Time: 108 msec (>120ms)  PV Max Escobar:    0.8 m/s  (0.6-0.9m/s)  PV Max P.5 mmHg  PV Mean P.0 mmHg  PV VTI:        16.70 cm      Brittany Lucia DO  Electronically signed on 2024 at 3:15:00 PM      Wall Scoring        ** Final **          Radiology:     XR chest 1 view   Final Result   No acute cardiopulmonary process.        Signed by: Kenneth Luna 3/10/2025 7:36 AM   Dictation workstation:   [id number]      Cardiac Catheterization Procedure    (Results Pending)       Problem List:     Patient Active Problem List   Diagnosis    Bilateral high frequency sensorineural hearing loss    BPH (benign prostatic hyperplasia)    CAD S/P percutaneous coronary angioplasty    Chronic obstructive pulmonary disease (Multi)    Diverticulosis of large intestine without hemorrhage    HTN (hypertension), benign     GERD (gastroesophageal reflux disease)    Headache    History of MI (myocardial infarction)    Lumbar spondylosis    Mixed hyperlipidemia    S/P TAVR (transcatheter aortic valve replacement)    Xerosis of skin    Pain in toes of both feet    Onychomycosis    BPV (benign positional vertigo), unspecified laterality    Class 1 obesity with body mass index (BMI) of 32.0 to 32.9 in adult    Endolymphatic hydrops of both ears    Former smoker    Primary osteoarthritis involving multiple joints    Chest pain, unspecified type    NSTEMI (non-ST elevated myocardial infarction) (Multi)       Assessment:   NSTEMI type I  Triple-vessel CAD  Aortic stenosis status post TAVR  Hypertension  Mixed hyperlipidemia  Obesity class I  Former smoker  Osteoarthritis      Plan:   Admit to medicine.  Remains on telemetry.  Telemetry shows normal sinus rhythm.  Reviewed EKGs from the emergency department which show normal sinus rhythm with a rate of 64.  Nonspecific ST and T wave abnormalities.  There is noted new T wave inversion in inferior leads which was not seen on prior EKGs.  No STEMI criteria  Supplemental O2  Monitor electrolytes  Significantly hypertensive initially upon arrival.  Blood pressure much better now.  Resume home dose Coreg for now.  Continue aspirin statin and beta-blocker  Okay for heparin infusion for now, trend assays.  Monitor for signs of bleeding  Moderate suspicion for ACS.  Patient has known triple-vessel disease although has new chest pain symptoms with elevated troponin level.  He does have a strong history of coronary artery disease with prior stenting.  Due to current symptoms and troponin elevation along with minor EKG changes we did discuss left heart catheterization.  Patient is agreeable to proceed with left heart catheterization with Dr. Harrison.  Risk and benefits were discussed.  Check lipid panel  Further recommendations to follow post [facility]      [facility]  Adult Gerontology  Acute Care Nurse Practitioner  [facility] [place]   [facility]  [phone]      Of note, this documentation is completed using the Dragon Dictation system (voice recognition software). There may be spelling and/or grammatical errors that were not corrected prior to final submission.      Electronically signed by JONATHON Birmingham, on 3/10/2025 at 9:07 AM

## 2025-03-10 NOTE — Clinical Note
Vessel: RCA (proximal). Stent inserted and advanced. Inflation 1: Pressure = 12 bernadine; Duration = 30 sec.

## 2025-03-10 NOTE — Clinical Note
Vessel: RCA (proximal). Stent inserted and advanced. Inflation 1: Pressure = 14 bernadine; Duration = 30 sec.

## 2025-03-10 NOTE — PROGRESS NOTES
Patient is stable status post LHC/PCI under the care of Dr. Harrison.  Discussed results of procedure with patient.  Pictures provided.  Findings of the LHC revealed 95 to 99% stenosis of the proximal RCA with thrombus, chronic occlusion of the circumflex artery, 90% stenosis in the distal OM 2, 50% stenosis in the ostial diagonal artery, 40% stenosis in the distal LAD.  Patient underwent successful PCI with 2 drug-eluting stents placed to the proximal RCA reducing that lesion down to 0% stenosis.  The patient tolerated the procedure well.  Please see procedural report for complete details.  Medical management is advised for remaining coronary artery disease.  Patient was initiated on DAPT with aspirin 81 mg daily and Plavix 75 mg daily.  He was not initiated on statin therapy due to history of myalgias.  Continue to monitor on telemetry.  Patient will be transferred to the stepdown unit post recovery.  Further recommendations pending clinical course.  Possible discharge on 3/11/2025 barring no complications.  Outpatient follow-up with Dr. Harrison has been arranged.  Postprocedural activity, restrictions, potential complications, medications and future follow-up discussed at length.  Lifestyle modifications discussed at length.  All questions answered.  Patient verbalized understanding.

## 2025-03-11 VITALS
RESPIRATION RATE: 18 BRPM | OXYGEN SATURATION: 94 % | WEIGHT: 210 LBS | TEMPERATURE: 97.9 F | HEART RATE: 68 BPM | BODY MASS INDEX: 31.83 KG/M2 | HEIGHT: 68 IN | SYSTOLIC BLOOD PRESSURE: 141 MMHG | DIASTOLIC BLOOD PRESSURE: 86 MMHG

## 2025-03-11 LAB
AORTIC VALVE MEAN GRADIENT: 13 MMHG
AORTIC VALVE PEAK VELOCITY: 2.7 M/S
ATRIAL RATE: 66 BPM
AV PEAK GRADIENT: 29 MMHG
AVA (PEAK VEL): 0.89 CM2
AVA (VTI): 0.92 CM2
EJECTION FRACTION APICAL 4 CHAMBER: 70.2
EJECTION FRACTION: 60 %
LEFT ATRIUM VOLUME AREA LENGTH INDEX BSA: 27.8 ML/M2
LEFT VENTRICLE INTERNAL DIMENSION DIASTOLE: 5.16 CM (ref 3.5–6)
LEFT VENTRICULAR OUTFLOW TRACT DIAMETER: 2 CM
LV EJECTION FRACTION BIPLANE: 66 %
MITRAL VALVE E/A RATIO: 0.68
P AXIS: 78 DEGREES
P OFFSET: 175 MS
P ONSET: 110 MS
PR INTERVAL: 192 MS
Q ONSET: 206 MS
QRS COUNT: 11 BEATS
QRS DURATION: 114 MS
QT INTERVAL: 446 MS
QTC CALCULATION(BAZETT): 467 MS
QTC FREDERICIA: 460 MS
R AXIS: 46 DEGREES
RIGHT VENTRICLE PEAK SYSTOLIC PRESSURE: 21 MMHG
T AXIS: 28 DEGREES
T OFFSET: 429 MS
VENTRICULAR RATE: 66 BPM

## 2025-03-11 PROCEDURE — 2500000001 HC RX 250 WO HCPCS SELF ADMINISTERED DRUGS (ALT 637 FOR MEDICARE OP): Performed by: STUDENT IN AN ORGANIZED HEALTH CARE EDUCATION/TRAINING PROGRAM

## 2025-03-11 PROCEDURE — 99232 SBSQ HOSP IP/OBS MODERATE 35: CPT | Performed by: NURSE PRACTITIONER

## 2025-03-11 PROCEDURE — 99239 HOSP IP/OBS DSCHRG MGMT >30: CPT | Performed by: STUDENT IN AN ORGANIZED HEALTH CARE EDUCATION/TRAINING PROGRAM

## 2025-03-11 PROCEDURE — 2500000001 HC RX 250 WO HCPCS SELF ADMINISTERED DRUGS (ALT 637 FOR MEDICARE OP): Performed by: NURSE PRACTITIONER

## 2025-03-11 RX ORDER — EZETIMIBE 10 MG/1
10 TABLET ORAL NIGHTLY
Status: DISCONTINUED | OUTPATIENT
Start: 2025-03-11 | End: 2025-03-11 | Stop reason: HOSPADM

## 2025-03-11 RX ORDER — FENOFIBRATE 54 MG/1
54 TABLET ORAL DAILY
Status: DISCONTINUED | OUTPATIENT
Start: 2025-03-11 | End: 2025-03-11 | Stop reason: HOSPADM

## 2025-03-11 RX ORDER — FENOFIBRATE 54 MG/1
54 TABLET ORAL DAILY
Qty: 30 TABLET | Refills: 3 | Status: SHIPPED | OUTPATIENT
Start: 2025-03-11 | End: 2025-03-11 | Stop reason: HOSPADM

## 2025-03-11 RX ADMIN — ASPIRIN 81 MG: 81 TABLET, CHEWABLE ORAL at 08:41

## 2025-03-11 RX ADMIN — PANTOPRAZOLE SODIUM 40 MG: 40 TABLET, DELAYED RELEASE ORAL at 06:20

## 2025-03-11 RX ADMIN — CLOPIDOGREL 75 MG: 75 TABLET ORAL at 08:41

## 2025-03-11 RX ADMIN — CARVEDILOL 25 MG: 12.5 TABLET, FILM COATED ORAL at 08:40

## 2025-03-11 RX ADMIN — AMLODIPINE BESYLATE 10 MG: 5 TABLET ORAL at 08:40

## 2025-03-11 ASSESSMENT — COGNITIVE AND FUNCTIONAL STATUS - GENERAL
MOBILITY SCORE: 24
DAILY ACTIVITIY SCORE: 24

## 2025-03-11 ASSESSMENT — PAIN - FUNCTIONAL ASSESSMENT: PAIN_FUNCTIONAL_ASSESSMENT: 0-10

## 2025-03-11 ASSESSMENT — PAIN SCALES - GENERAL: PAINLEVEL_OUTOF10: 0 - NO PAIN

## 2025-03-11 NOTE — CARE PLAN
The clinical goals for the shift include pain    Problem: Pain  Goal: Takes deep breaths with improved pain control throughout the shift  Outcome: Adequate for Discharge  Goal: Turns in bed with improved pain control throughout the shift  Outcome: Adequate for Discharge  Goal: Walks with improved pain control throughout the shift  Outcome: Adequate for Discharge  Goal: Performs ADL's with improved pain control throughout shift  Outcome: Adequate for Discharge  Goal: Participates in PT with improved pain control throughout the shift  Outcome: Adequate for Discharge  Goal: Free from opioid side effects throughout the shift  Outcome: Adequate for Discharge  Goal: Free from acute confusion related to pain meds throughout the shift  Outcome: Adequate for Discharge     Problem: Skin  Goal: Promote/optimize nutrition  Outcome: Adequate for Discharge

## 2025-03-11 NOTE — DISCHARGE SUMMARY
Medical Group Discharge Summary  DISCHARGE DIAGNOSIS     CAD w/ stents  Chest pain     HOSPITAL COURSE AND DETAILS     This is a 64-year-old male with a significant past medical history of CAD with previous stents, aortic valve regurgitation status post TAVR, hypertension, hyperlipidemia, COPD that presented from home with chief complaints of chest pain.  Was seen by cardiology and underwent left heart catheterization yesterday.  Patient had 99% proximal RCA with thrombus requiring PCI with 2 FROYLAN placed to the proximal RCA.  Started on dual antiplatelet therapy with aspirin and clopidogrel.    Patient cleared for discharge this morning.  Echocardiogram showed an EF of 60% as well.  Patient chest pain-free.  Outpatient follow-up with cardiology and PCP to discuss hospitalization in the next couple weeks.      Total time spent on discharge: >30min      ---Of note, this documentation is completed using the Dragon Dictation system (voice recognition software). There may be spelling and/or grammatical errors that were not corrected prior to final submission.---    Naren Crawley MD    DISCHARGE PHYSICAL EXAM     Last Recorded Vitals:  Vitals:    03/10/25 2050 03/10/25 2347 03/11/25 0402 03/11/25 0736   BP: 142/87 108/69 138/85 128/87   BP Location: Left arm Left arm Left arm Left arm   Patient Position: Sitting Lying Lying Sitting   Pulse:  71 65 78   Resp: 16 18 18 18   Temp: 36.6 °C (97.9 °F) 36.5 °C (97.7 °F) 36.7 °C (98.1 °F) 36.7 °C (98.1 °F)   TempSrc: Temporal Temporal Temporal Temporal   SpO2: 94% 93% 93% 95%   Weight:       Height:         Physical Exam  Vitals reviewed.   Constitutional:       General: He is not in acute distress.     Appearance: Normal appearance. He is not ill-appearing.   HENT:      Head: Normocephalic and atraumatic.   Eyes:      Extraocular Movements: Extraocular movements intact.      Conjunctiva/sclera: Conjunctivae normal.   Cardiovascular:      Rate and Rhythm: Normal rate and  regular rhythm.      Pulses: Normal pulses.      Heart sounds: Normal heart sounds.   Pulmonary:      Effort: Pulmonary effort is normal.      Breath sounds: Normal breath sounds.   Abdominal:      General: Bowel sounds are normal. There is no distension.      Palpations: Abdomen is soft.      Tenderness: There is no abdominal tenderness. There is no guarding.   Musculoskeletal:         General: No swelling or tenderness. Normal range of motion.      Cervical back: Normal range of motion and neck supple.   Neurological:      General: No focal deficit present.      Mental Status: He is alert and oriented to person, place, and time. Mental status is at baseline.   Psychiatric:         Mood and Affect: Mood normal.         Behavior: Behavior normal.       DISCHARGE MEDICATIONS        Your medication list        START taking these medications        Instructions Last Dose Given Next Dose Due   aspirin 81 mg chewable tablet      Chew 1 tablet (81 mg) once daily. Note:  This should be taken every day. Do not fill before March 11, 2025.       clopidogrel 75 mg tablet  Commonly known as: Plavix      Take 1 tablet (75 mg) by mouth once daily for 365 doses. Do not fill before March 11, 2025.       ezetimibe 10 mg tablet  Commonly known as: Zetia      Take 1 tablet (10 mg) by mouth once daily.              CONTINUE taking these medications        Instructions Last Dose Given Next Dose Due   amLODIPine 10 mg tablet  Commonly known as: Norvasc      Take 1 tablet (10 mg) by mouth once daily.       ASCORBIC ACID (VITAMIN C) ORAL           calcium carbonate 300 mg elemental (750 mg) chewable tablet  Commonly known as: Tums Extra Strength           carvedilol 25 mg tablet  Commonly known as: Coreg      TAKE 1 TABLET BY MOUTH  TWICE DAILY       cholecalciferol 125 mcg (5000 UT) capsule  Commonly known as: Vitamin D-3           famotidine 20 mg tablet  Commonly known as: Pepcid           multivitamin tablet           nitroglycerin 0.4  mg SL tablet  Commonly known as: Nitrostat           omeprazole OTC 20 mg EC tablet  Commonly known as: PriLOSEC OTC           vitamin B complex tablet                  STOP taking these medications      methylPREDNISolone 4 mg tablets  Commonly known as: Medrol Dospak               ASK your doctor about these medications        Instructions Last Dose Given Next Dose Due   Fish OiL 1,200 (144-216) mg capsule  Generic drug: omega 3-dha-epa-fish oil                     Where to Get Your Medications        These medications were sent to [facility] [address] [place] [address] - [address]   [address] [place] [address]      Phone: [phone]   clopidogrel 75 mg tablet       Information about where to get these medications is not yet available    Ask your nurse or doctor about these medications  aspirin 81 mg chewable tablet           OUTPATIENT FOLLOW-UP     Future Appointments   Date Time Provider Department Center   3/24/2025  3:15 PM Antoine Harrison MD [id number] [place]   8/8/2025  2:00 PM Arnoldo Mota MD [id number] [place]   10/13/2025  1:00 PM [id number] ECHO/VASC 3 [id number] [facility]   10/20/2025  3:00 PM Antoine Harrison MD [id number] [place]

## 2025-03-11 NOTE — NURSING NOTE
Patient and wife given AVS and reviewed. Patient and wife verbalized understanding.  All belongings sent with patient.

## 2025-03-11 NOTE — PROGRESS NOTES
Rounding ADALBERTO/Cardiologist:  Zen Islas, APRN-CNP,   Primary Cardiologist: Dr. Antoine Harrison    Date:  3/11/2025  Patient:  Emiliano Mann  YOB: 1960  MRN:  [id number]   Admit Date:  3/10/2025      SUBJECTIVE:    Emiliano Mann was seen and examined today at bedside.     He denies any chest pain or shortness of breath.     Findings of the [facility] revealed 95 to 99% stenosis of the proximal RCA with thrombus, chronic occlusion of the circumflex artery, 90% stenosis in the distal OM 2, 50% stenosis in the ostial diagonal artery, 40% stenosis in the distal LAD. Patient underwent successful PCI with 2 drug-eluting stents placed to the proximal RCA reducing that lesion down to 0% stenosis.     VITALS:     Vitals:    03/10/25 2050 03/10/25 2347 03/11/25 0402 03/11/25 0736   BP: 142/87 108/69 138/85 128/87   BP Location: Left arm Left arm Left arm Left arm   Patient Position: Sitting Lying Lying Sitting   Pulse:  71 65 78   Resp: 16 18 18 18   Temp: 36.6 °C (97.9 °F) 36.5 °C (97.7 °F) 36.7 °C (98.1 °F) 36.7 °C (98.1 °F)   TempSrc: Temporal Temporal Temporal Temporal   SpO2: 94% 93% 93% 95%   Weight:       Height:           Intake/Output Summary (Last 24 hours) at 3/11/2025 0858  Last data filed at 3/11/2025 0600  Gross per 24 hour   Intake 925 ml   Output 401 ml   Net 524 ml       Wt Readings from Last 4 Encounters:   03/10/25 95.3 kg (210 lb)   01/17/25 90.7 kg (200 lb)   11/11/24 93 kg (205 lb)   10/28/24 97.9 kg (215 lb 14.4 oz)       CURRENT HOSPITAL MEDICATIONS:   amLODIPine, 10 mg, oral, Daily  aspirin, 81 mg, oral, Daily  carvedilol, 25 mg, oral, BID  clopidogrel, 75 mg, oral, Daily  ezetimibe, 10 mg, oral, Nightly  fenofibrate, 54 mg, oral, Daily  pantoprazole, 40 mg, oral, Daily before breakfast         Current Outpatient Medications   Medication Instructions    amLODIPine (NORVASC) 10 mg, oral, Daily    ASCORBIC ACID, VITAMIN C, ORAL 282 mg, Daily    aspirin 81 mg,  3 times weekly    aspirin 81 mg, oral, Daily, Note:  This should be taken every day.    calcium carbonate (TUMS EXTRA STRENGTH) 600 mg, Nightly PRN    carvedilol (COREG) 25 mg, oral, 2 times daily    cholecalciferol (VITAMIN D-3) 5,000 Units, Daily    clopidogrel (PLAVIX) 75 mg, oral, Daily    ezetimibe (ZETIA) 10 mg, oral, Daily    famotidine (PEPCID) 20 mg, Daily PRN    methylPREDNISolone (Medrol Dospak) 4 mg tablets Follow schedule on package instructions    methylPREDNISolone (Medrol Dospak) 4 mg tablets Take as directed on package.    multivitamin tablet 1 tablet, 3 times weekly    nitroglycerin (NITROSTAT) 0.4 mg, Every 5 min PRN    omega 3-dha-epa-fish oil (Fish OiL) 1,200 (144-216) mg capsule 1 capsule, Daily    omeprazole OTC (PRILOSEC OTC) 20 mg, Daily PRN    vitamin B complex tablet 1 tablet, Daily        PHYSICAL EXAMINATION:     Physical Exam    LAB DATA:     CBC:   Results from last 7 days   Lab Units 03/10/25  0652   WBC AUTO x10*3/uL 9.2   RBC AUTO x10*6/uL 5.02   HEMOGLOBIN g/dL 15.7   HEMATOCRIT % 44.5   MCV fL 89   MCH pg 31.3   MCHC g/dL 35.3   RDW % 11.9   PLATELETS AUTO x10*3/uL 254     CMP:    Results from last 7 days   Lab Units 03/10/25  0652   SODIUM mmol/L 137   POTASSIUM mmol/L 3.8   CHLORIDE mmol/L 104   CO2 mmol/L 24   BUN mg/dL 14   CREATININE mg/dL 1.04   GLUCOSE mg/dL 98   PROTEIN TOTAL g/dL 7.0   CALCIUM mg/dL 9.2   BILIRUBIN TOTAL mg/dL 0.6   ALK PHOS U/L 53   AST U/L 16   ALT U/L 18     BMP:    Results from last 7 days   Lab Units 03/10/25  0652   SODIUM mmol/L 137   POTASSIUM mmol/L 3.8   CHLORIDE mmol/L 104   CO2 mmol/L 24   BUN mg/dL 14   CREATININE mg/dL 1.04   CALCIUM mg/dL 9.2   GLUCOSE mg/dL 98     Magnesium:  Results from last 7 days   Lab Units 03/10/25  0652   MAGNESIUM mg/dL 2.03     Troponin:    Results from last 7 days   Lab Units 03/10/25  0752 03/10/25  0652   TROPHS ng/L 423* 439*     BNP:   Results from last 7 days   Lab Units 03/10/25  0652   BNP pg/mL 72      Lipid Panel:  Results from last 7 days   Lab Units 03/10/25  0752   HDL mg/dL 45.2   CHOLESTEROL/HDL RATIO  6.0   VLDL mg/dL 51*   TRIGLYCERIDES mg/dL 255*   NON HDL CHOL. mg/dL 225*        DIAGNOSTIC TESTING:   @No results found for this or any previous visit.    Echocardiogram: Results for orders placed during the hospital encounter of 03/10/25    Transthoracic Echo (TTE) Complete    Narrative  [place] [id number]  Tel [phone] Fax [phone]    TRANSTHORACIC ECHOCARDIOGRAM REPORT    Patient Name:       PRATIK NEWTON MICHELLE       Reading Physician:    [id number] Mikey Lucia DO  Study Date:         3/10/2025            Ordering Provider:    [id number] SHANE RICHMOND  MRN/PID:            [id number]             Fellow:  Accession#:         [id number]         Nurse:  Date of Birth/Age:  1960 / 64 years Sonographer:          Ena Pacheco RDCS  Gender Assigned at  M                    Additional Staff:  Birth:  Height:             172.72 cm            Admit Date:           3/10/2025  Weight:             95.26 kg             Admission Status:     Inpatient -  Routine  BSA / BMI:          2.09 m2 / 31.93      Department Location:   CathLab  kg/m2  Blood Pressure: 164 /101 mmHg    Study Type:    TRANSTHORACIC ECHO (TTE) COMPLETE  Diagnosis/ICD: Non ST elevation (NSTEMI) myocardial infarction-I21.4  Indication:    Acute Coronary Syndrome: Non-STEMI, s/p CATH/PCI  CPT Codes:     Echo Complete w Full Doppler-85235    Patient History:  MI Location/Type:  Non-ST Elevation MI  PCI and Stent:     PCI performed on 3/10/2025 involving RCA.  Pertinent History: CAD, HTN, Hyperlipidemia, Chest Pain, COPD and Hx PTCA/PCI,  Hx Old MI, GERD, Vertigo, s/p TAVR (#26mm De La Torre Earline 3).    Study Detail: The following Echo studies were performed: 2D, M-Mode, Doppler and  color flow. Technically challenging study due to poor acoustic  windows and patient lying in supine position. The  patient was  awake.      PHYSICIAN INTERPRETATION:  Left Ventricle: The left ventricular systolic function is normal, with a visually estimated ejection fraction of 60%. There is mild left ventricular hypertrophy. There are no regional wall motion abnormalities. The left ventricular cavity size is normal. There is mild increased septal and normal posterior left ventricular wall thickness. Spectral Doppler shows a Grade I (impaired relaxation pattern) of left ventricular diastolic filling with normal left atrial filling pressure.  LV Wall Scoring:  All segments are normal.    Left Atrium: The left atrial size is normal.  Right Ventricle: The right ventricle is normal in size. There is normal right ventricular global systolic function.  Right Atrium: The right atrial size is normal.  Aortic Valve: There is a prosthetic aortic valve present. The aortic valve dimensionless index is 0.29. There is an De La Torre transcatheter aortic valve prosthesis with a 26 mm reported size. Echo findings are consistent with normal aortic valve prosthesis structure and function. There is no evidence of aortic valve regurgitation. The peak instantaneous gradient of the aortic valve is 29 mmHg. The mean gradient of the aortic valve is 13 mmHg.  Mitral Valve: The mitral valve is normal in structure. There is no evidence of mitral valve stenosis. There is normal mitral valve leaflet mobility. There is mild mitral annular calcification. There is trace mitral valve regurgitation.  Tricuspid Valve: The tricuspid valve is structurally normal. There is normal tricuspid valve leaflet mobility. There is trace tricuspid regurgitation.  Pulmonic Valve: The pulmonic valve is structurally normal. There is no indication of pulmonic valve regurgitation.  Pericardium: No pericardial effusion noted.  Aorta: The aortic root is normal.  Pulmonary Artery: The pulmonary artery is not well visualized. The tricuspid regurgitant velocity is 2.12 m/s, and with an  estimated right atrial pressure of 3 mmHg, the estimated pulmonary artery pressure is normal with the RVSP at 21.0 mmHg.  Systemic Veins: The inferior vena cava was not well visualized.  In comparison to the previous echocardiogram(s): Compared with study dated 2/27/2025, no significant change.      CONCLUSIONS:  1. The left ventricular systolic function is normal, with a visually estimated ejection fraction of 60%.  2. Spectral Doppler shows a Grade I (impaired relaxation pattern) of left ventricular diastolic filling with normal left atrial filling pressure.  3. There is normal right ventricular global systolic function.  4. Normal sized right ventricle.  5. There is no evidence of mitral valve stenosis.  6. Trace mitral valve regurgitation.  7. Trace tricuspid regurgitation is visualized.  8. There is an De La Torre transcatheter aortic valve prosthesis with a 26 mm reported size.  9. The pulmonary artery is not well visualized.  10. Normal aortic valve prosthesis structure and function.    RECOMMENDATIONS:  Technically suboptimal and limited study, therefore accuracy of above interpretation could be substantially diminished. Clinical correlation is advised. Consider additional imaging modalities if clinically indicated.      QUANTITATIVE DATA SUMMARY:    2D MEASUREMENTS:             Normal Ranges:  Ao Root d:       3.10 cm     (2.0-3.7cm)  LAs:             3.80 cm     (2.7-4.0cm)  IVSd:            1.20 cm     (0.6-1.1cm)  LVPWd:           0.99 cm     (0.6-1.1cm)  LVIDd:           5.16 cm     (3.9-5.9cm)  LVIDs:           3.69 cm  LV Mass Index:   104.0 g/m2  LVEDV Index:     45.44 ml/m2  LV % FS          28.5 %      LEFT ATRIUM:                  Normal Ranges:  LA Vol A4C:        62.7 ml    (22+/-6mL/m2)  LA Vol A2C:        52.7 ml  LA Vol BP:         58.0 ml  LA Vol Index A4C:  30.1ml/m2  LA Vol Index A2C:  25.2 ml/m2  LA Vol Index BP:   27.8 ml/m2  LA Area A4C:       19.4 cm2  LA Area A2C:       17.6 cm2  LA Major  Axis A4C: 5.1 cm  LA Major Axis A2C: 5.0 cm  LA Volume Index:   27.1 ml/m2  LA Vol A4C:        60.7 ml  LA Vol A2C:        51.7 ml  LA Vol Index BSA:  26.9 ml/m2      RIGHT ATRIUM:                 Normal Ranges:  RA Vol A4C:        36.7 ml    (8.3-19.5ml)  RA Vol Index A4C:  17.6 ml/m2  RA Area A4C:       14.7 cm2  RA Major Axis A4C: 5.0 cm      LV SYSTOLIC FUNCTION:  Normal Ranges:  EF-A4C View:    70 % (>=55%)  EF-A2C View:    59 %  EF-Biplane:     66 %  EF-Visual:      60 %  LV EF Reported: 60 %      LV DIASTOLIC FUNCTION:          Normal Ranges:  MV Peak E:             0.70 m/s (0.7-1.2 m/s)  MV Peak A:             1.02 m/s (0.42-0.7 m/s)  E/A Ratio:             0.68     (1.0-2.2)      MITRAL VALVE:          Normal Ranges:  MV DT:        304 msec (150-240msec)      AORTIC VALVE:                      Normal Ranges:  AoV Vmax:                2.70 m/s  (<=1.7m/s)  AoV Peak P.1 mmHg (<20mmHg)  AoV Mean P.3 mmHg (1.7-11.5mmHg)  LVOT Max Escobar:            0.76 m/s  (<=1.1m/s)  AoV VTI:                 54.40 cm  (18-25cm)  LVOT VTI:                15.90 cm  LVOT Diameter:           2.00 cm   (1.8-2.4cm)  AoV Area, VTI:           0.92 cm2  (2.5-5.5cm2)  AoV Area,Vmax:           0.89 cm2  (2.5-4.5cm2)  AoV Dimensionless Index: 0.29      TRICUSPID VALVE/RVSP:          Normal Ranges:  Peak TR Velocity:     2.12 m/s  RV Syst Pressure:     21 mmHg  (< 30mmHg)      PULMONIC VALVE:          Normal Ranges:  PV Accel Time:  127 msec (>120ms)  PV Max Escobar:     1.2 m/s  (0.6-0.9m/s)  PV Max P.2 mmHg  PV Mean PG:     3.0 mmHg  PV VTI:         26.40 cm      [id number] Mikey Lucia DO  Electronically signed on 3/11/2025 at 8:56:01 AM      Wall Scoring        ** Final **      Coronary Angiography:   PCI FROYLAN Stent- Coronary, PCI FROYLAN Stent- Coronary 03/10/2025    [facility], Cath Lab  [address] [place] [address]    Cardiovascular Catheterization Report    Patient Name:       PRATIK MARTINEZ       Performing Physician:  [id number] Antoine Glynn MD  Study Date:        3/10/2025            Verifying Physician:   [id number] Antoine Glynn MD  MRN/PID:           [id number]             Cardiologist/Co-Scrub:  Accession#:        [id number]         Ordering Provider:     [id number] WILLOW VALERIE RECIO  Date of Birth/Age: 1960 / 64 years Cardiologist:  Gender:            M                    Fellow:  Encounter#:        [id number]           Surgeon:      Study:            Left Heart Cath  Additional Study: PCI - Percutaneous Coronary Intervention      Indications:  PRATIK MARTINEZ is a 65 year old male who presents with prior percutaneous coronary intervention, coronary artery disease, dyslipidemia and a chest pain assessment of typical angina. NSTE - ACS.  Stress test performed: No. CTA performed: NoXenia Snyder accessed: No. LVEF  Assessed: No.  Cardiac arrest: No.  Cardiac surgical consult: No.  Cardiovascular Instability: No  Frailty status of patient entering lab: 7 = Severely frail.      Procedure Description:  After infiltration with 2% Lidocaine, the right femoral artery was cannulated with a modified Seldinger technique. Subsequently a 5 [place] sheath was placed in the right femoral artery. The arterial sheath was sized up to 6 [place]. Selective coronary catheterization was performed using a 5 Fr catheter(s) exchanged over a guide wire to cannulate the coronary arteries. A JL 4 tip catheter was used for left coronary injections. A 3DRC tip catheter was used for right coronary injections.  Multiple injections of contrast were made into the left and right coronary arteries with angiograms recorded in multiple projections. After completion of the procedure, femoral artery angiography was performed. This demonstrated a common femoral artery puncture appropriate for closure. A Vascade 6/7Fr vascular closure Device was placed per protocol. Post-procedure, the venous sheath was pulled and  pressure was applied to the site.    Coronary Angiography:  The coronary circulation is right dominant.    Left Main Coronary Artery:  There is <10% stenosis in the entire left main coronary artery.    Left Anterior Descending Coronary Artery Distribution:  There is 10-30% stenosis in the proximal and mid left anterior descending artery. Contains patent previously placed stents. There is 50% stenosis in the Prox 1st Diag left anterior descending artery. There is 40% stenosis in the the distal left anterior descending artery.    Circumflex Coronary Artery Distribution:  There is 100% stenosis in the the mid Circumflex artery. There is evidence of instent restenosis in this segment. There is 90% stenosis in the Distal 2nd OM Circumflex artery.    Right Coronary Artery Distribution:    There is 99% stenosis in the the proximal Right Coronary Artery. This segment is associated with thrombus. The devices advanced to the the proximal RCA lesion were: a balloon was inflated for pre-dilation, Resolute [place] 3.50x22 stent was deployed in the lesion, Resolute [place] 3.50x8 stent was deployed in the lesion a balloon was inflated for post-dilation. Residual stenosis is <10%. The distal right coronary artery showed 50% stenosis.    Coronary Interventions:  Angiography reveals a 99% stenosis of the proximal right coronary artery coronary artery. Pre-intervention JAMES flow was 2. Percutaneous coronary intervention was performed within the proximal right coronary artery. The stenosis was successfully reduced from 99% to <10%. Post-intervention JAMES flow was 3.    Hemo Personnel:  +---------------------------+---------+  Name                       Duty       +---------------------------+---------+  Antoine Harrison MD 1  +---------------------------+---------+      Hemodynamic Pressures:    +----+---------------------+----------+-------------+--------------+---------+  Site      Date Time      Phase  NameSystolic mmHgDiastolic mmHgMean mmHg  +----+---------------------+----------+-------------+--------------+---------+    AO3/10/2025 10:39:42 AM  AIR REST          110            74       94  +----+---------------------+----------+-------------+--------------+---------+    AO3/10/2025 10:39:58 AM  AIR REST          115            80       95  +----+---------------------+----------+-------------+--------------+---------+    AO3/10/2025 10:43:23 AM  AIR REST          113            84       99  +----+---------------------+----------+-------------+--------------+---------+    AO3/10/2025 10:50:18 AM  AIR REST          130            85      103  +----+---------------------+----------+-------------+--------------+---------+    AO3/10/2025 10:57:12 AM  AIR REST          122            80       97  +----+---------------------+----------+-------------+--------------+---------+      Cardiac Cath Post Procedure Notes:  Post Procedure Diagnosis: FROYLAN of RCA.  Blood Loss:               Estimated blood loss during the procedure was 0 mls.  Specimens Removed:        Number of specimen(s) removed: none.    ____________________________________________________________________________________  CONCLUSIONS:  1. The entire Left Main: <10% stenosis.  2. Left Anterior Descending Artery: contains patent previously placed stents.  3. Proximal and mid LAD Lesion: The percent stenosis is 10-30%.  4. Prox 1st Diag LAD Lesion: The percent stenosis is 50%.  5. Distal LAD Lesion: The percent stenosis is 40%.  6. Mid CX Lesion: The percent stenosis is 100%.  7. Mid CX Lesion: Instent Restenosis.  8. Distal 2nd OM CX Lesion: The percent stenosis is 90%.  9. Proximal RCA Lesion: The percent stenosis is 99%.  10. Proximal RCA Lesion: pre-dilation, Resolute [place] 3.50x22, Resolute [place] 3.50x8 post-dilation: <10% residual stenosis.  11. Distal RCA Lesion: The percent stenosis is 50%.    ICD 10 Codes:  Non ST  elevation (NSTEMI) myocardial infarction-I21.4    CPT Codes:  Coronary Angiography (C)-47088; Moderate Sedation Services initial 15 minutes patient >5 years-65005; Stent w angioplasty Right Coronary single major Artery branch (PCI)-31378.    [id number] Antoine Glynn MD  Performing Physician  Electronically signed by [id number] Antoine Glynn MD on 3/10/2025 at 11:34:10  AM          ** Final **            RADIOLOGY:     Transthoracic Echo (TTE) Complete   Final Result      Cardiac Catheterization Procedure   Final Result      XR chest 1 view   Final Result   No acute cardiopulmonary process.        Signed by: Kenneth Luna 3/10/2025 7:36 AM   Dictation workstation:   [id number]          PROBLEM LIST     Patient Active Problem List   Diagnosis    Bilateral high frequency sensorineural hearing loss    BPH (benign prostatic hyperplasia)    CAD S/P percutaneous coronary angioplasty    Chronic obstructive pulmonary disease (Multi)    Diverticulosis of large intestine without hemorrhage    HTN (hypertension), benign    GERD (gastroesophageal reflux disease)    Headache    History of MI (myocardial infarction)    Lumbar spondylosis    Mixed hyperlipidemia    S/P TAVR (transcatheter aortic valve replacement)    Xerosis of skin    Pain in toes of both feet    Onychomycosis    BPV (benign positional vertigo), unspecified laterality    Class 1 obesity with body mass index (BMI) of 32.0 to 32.9 in adult    Endolymphatic hydrops of both ears    Former smoker    Primary osteoarthritis involving multiple joints    Chest pain, unspecified type    NSTEMI (non-ST elevated myocardial infarction) (Multi)       ASSESSMENT:   NSTEMI type I  Triple-vessel CAD  Aortic stenosis status post TAVR  Hypertension  Mixed hyperlipidemia  Obesity class I  Former smoker  Osteoarthritis        PLAN:   Admit to medicine.  Remains on telemetry.  Telemetry shows normal sinus rhythm.  Reviewed EKGs from the emergency department which show normal  sinus rhythm with a rate of 64.  Nonspecific ST and T wave abnormalities.  There is noted new T wave inversion in inferior leads which was not seen on prior EKGs.  No STEMI criteria  Supplemental O2  Monitor electrolytes  Significantly hypertensive initially upon arrival.  Blood pressure much better now.  Resume home dose Coreg for now.  Continue aspirin and beta-blocker, intolerant to statins.  Findings of the [facility] revealed 95 to 99% stenosis of the proximal RCA with thrombus, chronic occlusion of the circumflex artery, 90% stenosis in the distal OM 2, 50% stenosis in the ostial diagonal artery, 40% stenosis in the distal LAD. Patient underwent successful PCI with 2 drug-eluting stents placed to the proximal RCA reducing that lesion down to 0% stenosis.   Continue DAPT  Elevated cholesterol and triglycerides, defer to patient's primary cardiologist.  He has been intolerant to statins in the past.  Echocardiogram showed normal LV function with estimated EF of 60%, mild MR and TR.  No significant change from prior echocardiogram.  Message sent to schedulers to follow-up with Dr. Hunter Calero to discharge from general cardiology perspective        Zen Islas [facility]  Adult Gerontology Acute Care Nurse Practitioner  [facility] [place]   [facility]  [phone]          Of note, this documentation is completed using the Dragon Dictation system (voice recognition software). There may be spelling and/or grammatical errors that were not corrected prior to final submission.    Please do not hesitate to call with questions.  Electronically signed by JONATHON Birmingham, on 3/11/2025 at 8:58 AM

## 2025-03-11 NOTE — CARE PLAN
The clinical goals for the shift include Pt will maintain NSR throughout shift.    Over the shift, the patient did make progress toward the following goals. Barriers to progression include coronary artery disease. Recommendations to address these barriers include education on lifestyle changes to positively impact health.

## 2025-03-12 ENCOUNTER — PATIENT OUTREACH (OUTPATIENT)
Dept: CARDIOLOGY | Facility: CLINIC | Age: 65
End: 2025-03-12
Payer: COMMERCIAL

## 2025-03-12 NOTE — PROGRESS NOTES
Discharge Facility: [facility]  Discharge Diagnosis: CP  Admission Date: 3/10/25  Discharge Date: 3/11/25    PCP Appointment Date:   Patient encouraged to make an appt with PCP    Specialist Appointment Date:   MAR 24  2025 03:15 PM - Cardiology Hospital Discharge  [facility] - Antoine Harrison MD     Hospital Encounter and Summary Linked: Yes      See discharge assessment below for further details      Wrap Up  Wrap Up Additional Comments: Patient denies CP or SOB. Patient states his groin site is without drainage or increased pain, redness or swelling. Reviewed new medications and patient has an understanding of indications. Reivewed upcoming appts and encouraged patient to make an appt with PCP. This CM gives contact information for non urgent matters. (3/12/2025  9:39 AM)    Engagement  Call Start Time: 0930 (3/12/2025  9:39 AM)    Medications  Medications reviewed with patient/caregiver?: Yes (3/12/2025  9:39 AM)  Is the patient having any side effects they believe may be caused by any medication additions or changes?: No (3/12/2025  9:39 AM)  Does the patient have all medications ordered at discharge?: No (Medications will be ready today) (3/12/2025  9:39 AM)  Prescription Comments: START taking:  aspirin  clopidogrel (Plavix)   ezetimibe (Zetia) (3/12/2025  9:39 AM)    Appointments  Does the patient have a primary care provider?: Yes (3/12/2025  9:39 AM)  Care Management Interventions: Advised patient to make appointment (3/12/2025  9:39 AM)  Care Management Interventions: Advised patient to keep appointment (3/12/2025  9:39 AM)    Self Management  What is the home health agency?: n/a (3/12/2025  9:39 AM)  What Durable Medical Equipment (DME) was ordered?: n/a (3/12/2025  9:39 AM)    Patient Teaching  Does the patient have access to their discharge instructions?: Yes (3/12/2025  9:39 AM)  Care Management Interventions: Reviewed instructions with patient (3/12/2025  9:39 AM)  What is the  patient's perception of their health status since discharge?: Improving (3/12/2025  9:39 AM)  Is the patient/caregiver able to teach back the hierarchy of who to call/visit for symptoms/problems? PCP, Specialist, Home Health nurse, Urgent Care, ED, 911: Yes (3/12/2025  9:39 AM)

## 2025-03-24 ENCOUNTER — APPOINTMENT (OUTPATIENT)
Dept: CARDIOLOGY | Facility: CLINIC | Age: 65
End: 2025-03-24
Payer: MEDICARE

## 2025-03-24 VITALS
DIASTOLIC BLOOD PRESSURE: 74 MMHG | HEART RATE: 66 BPM | WEIGHT: 215.7 LBS | BODY MASS INDEX: 30.88 KG/M2 | SYSTOLIC BLOOD PRESSURE: 136 MMHG | HEIGHT: 70 IN

## 2025-03-24 DIAGNOSIS — I10 HTN (HYPERTENSION), BENIGN: ICD-10-CM

## 2025-03-24 DIAGNOSIS — J44.9 CHRONIC OBSTRUCTIVE PULMONARY DISEASE, UNSPECIFIED COPD TYPE (MULTI): ICD-10-CM

## 2025-03-24 DIAGNOSIS — K21.9 GASTROESOPHAGEAL REFLUX DISEASE WITHOUT ESOPHAGITIS: ICD-10-CM

## 2025-03-24 DIAGNOSIS — I10 ESSENTIAL HYPERTENSION: ICD-10-CM

## 2025-03-24 DIAGNOSIS — E78.2 MIXED HYPERLIPIDEMIA: ICD-10-CM

## 2025-03-24 DIAGNOSIS — I25.10 CAD S/P PERCUTANEOUS CORONARY ANGIOPLASTY: ICD-10-CM

## 2025-03-24 DIAGNOSIS — Z87.891 FORMER SMOKER: ICD-10-CM

## 2025-03-24 DIAGNOSIS — Z98.61 CAD S/P PERCUTANEOUS CORONARY ANGIOPLASTY: ICD-10-CM

## 2025-03-24 DIAGNOSIS — I21.4 NSTEMI (NON-ST ELEVATED MYOCARDIAL INFARCTION) (MULTI): ICD-10-CM

## 2025-03-24 DIAGNOSIS — Z95.2 S/P TAVR (TRANSCATHETER AORTIC VALVE REPLACEMENT): ICD-10-CM

## 2025-03-24 DIAGNOSIS — I25.2 HISTORY OF MI (MYOCARDIAL INFARCTION): ICD-10-CM

## 2025-03-24 PROBLEM — E66.811 CLASS 1 OBESITY WITH BODY MASS INDEX (BMI) OF 32.0 TO 32.9 IN ADULT: Status: RESOLVED | Noted: 2023-09-08 | Resolved: 2025-03-24

## 2025-03-24 PROCEDURE — 99214 OFFICE O/P EST MOD 30 MIN: CPT | Performed by: INTERNAL MEDICINE

## 2025-03-24 PROCEDURE — 3078F DIAST BP <80 MM HG: CPT | Performed by: INTERNAL MEDICINE

## 2025-03-24 PROCEDURE — 1157F ADVNC CARE PLAN IN RCRD: CPT | Performed by: INTERNAL MEDICINE

## 2025-03-24 PROCEDURE — 1036F TOBACCO NON-USER: CPT | Performed by: INTERNAL MEDICINE

## 2025-03-24 PROCEDURE — 3075F SYST BP GE 130 - 139MM HG: CPT | Performed by: INTERNAL MEDICINE

## 2025-03-24 PROCEDURE — 1159F MED LIST DOCD IN RCRD: CPT | Performed by: INTERNAL MEDICINE

## 2025-03-24 PROCEDURE — 1111F DSCHRG MED/CURRENT MED MERGE: CPT | Performed by: INTERNAL MEDICINE

## 2025-03-24 PROCEDURE — 1123F ACP DISCUSS/DSCN MKR DOCD: CPT | Performed by: INTERNAL MEDICINE

## 2025-03-24 PROCEDURE — 3008F BODY MASS INDEX DOCD: CPT | Performed by: INTERNAL MEDICINE

## 2025-03-24 RX ORDER — AMLODIPINE BESYLATE 10 MG/1
10 TABLET ORAL DAILY
Qty: 90 TABLET | Refills: 3 | Status: SHIPPED | OUTPATIENT
Start: 2025-03-24 | End: 2026-03-24

## 2025-03-24 NOTE — PATIENT INSTRUCTIONS
Patient to follow up in 1 year with Dr. Antoine Glynn MD      No changes today.   Continue same medications and treatments.   Patient educated on proper medication use.   Patient educated on risk factor modification.   Please bring any lab results from other providers / physicians to your next appointment.     Please bring all medicines, vitamins, and herbal supplements with you when you come to the office.     Prescriptions will not be filled unless you are compliant with your follow up appointments or have a follow up appointment scheduled as per instruction of your physician. Refills should be requested at the time of your visit.    I, Maximo Alvarenga RN am scribing for and in the presence of Dr. Antoine Harrison MD

## 2025-03-24 NOTE — PROGRESS NOTES
CARDIOLOGY OFFICE VISIT      CHIEF COMPLAINT  Chief Complaint   Patient presents with    Follow-up     Pt is here today following up after recent discharge, cardiac cath        HISTORY OF PRESENT ILLNESS  The patient states that he has been doing well.  He was hospitalized about 2 weeks ago with chest pain at [facility].  He had his NSTEMI.  He underwent successful PCI with FROYLAN of proximal LAD.  He has done well since then.  He has not had any further chest discomfort.  He denies dyspnea.  He denies palpitations syncope.  His procedure site is healed well.  He is not having any problem with his current medication.    IMPRESSION:   1. Coronary artery disease, no angina.  2. Multivessel PCI, most recent FROYLAN PCI to proximal RCA 3/10/25  3. Remote non-ST-segment elevation myocardial infarction.  4. Mixed hyperlipidemia, unable to tolerate statins.  5. Essential hypertension.  6. Chronic obstructive pulmonary disease.  7. Obesity.  8. Former smoker  9. s/p TAVR - 26mm Earline with Dr. Pierson 10/2022        Please excuse any errors in grammar or translation related to this dictation. Voice recognition software was utilized to prepare this document.    Past Medical History  Past Medical History:   Diagnosis Date    Personal history of other diseases of the circulatory system 10/20/2022    History of aortic valve stenosis       Social History  Social History     Tobacco Use    Smoking status: Former     Current packs/day: 0.00     Average packs/day: 1.5 packs/day for 24.0 years (36.0 ttl pk-yrs)     Types: Cigarettes     Start date:      Quit date: 2000     Years since quittin.2    Smokeless tobacco: Never   Substance Use Topics    Alcohol use: Not Currently     Alcohol/week: 4.0 standard drinks of alcohol     Types: 4 Glasses of wine per week    Drug use: Never       Family History     Family History   Problem Relation Name Age of Onset    Hyperlipidemia Mother      Cancer Father      Stroke Maternal Grandfather       Heart attack Maternal Grandfather          Allergies:  Allergies   Allergen Reactions    Ezetimibe Myalgia    Lisinopril Cough    Losartan Potassium Dizziness     Patient had vertigo    Morphine Nausea/vomiting    Statins-Hmg-Coa Reductase Inhibitors Myalgia     Leg, thighs cramps  Hx of pravastatin, simvastatin        Outpatient Medications:  Current Outpatient Medications   Medication Instructions    amLODIPine (NORVASC) 10 mg, oral, Daily    ASCORBIC ACID, VITAMIN C, ORAL 282 mg, Daily    aspirin 81 mg, oral, Daily, Note:  This should be taken every day.    calcium carbonate (TUMS EXTRA STRENGTH) 600 mg, Nightly PRN    carvedilol (COREG) 25 mg, oral, 2 times daily    cholecalciferol (VITAMIN D-3) 5,000 Units, Daily    clopidogrel (PLAVIX) 75 mg, oral, Daily    famotidine (PEPCID) 20 mg, Daily PRN    multivitamin tablet 1 tablet, 3 times weekly    nitroglycerin (NITROSTAT) 0.4 mg, Every 5 min PRN    omeprazole OTC (PRILOSEC OTC) 20 mg, Daily PRN    vitamin B complex tablet 1 tablet, Daily          REVIEW OF SYSTEMS  Review of Systems   All other systems reviewed and are negative.        VITALS  Vitals:    03/24/25 1514   BP: 136/74   Pulse: 66       PHYSICAL EXAM  Vitals reviewed.   Constitutional:       Appearance: Normal and healthy appearance. Well-developed and not in distress.   Eyes:      Conjunctiva/sclera: Conjunctivae normal.      Pupils: Pupils are equal, round, and reactive to light.   Neck:      Vascular: No JVR. JVD normal.   Pulmonary:      Effort: Pulmonary effort is normal.      Breath sounds: Normal breath sounds. No wheezing. No rhonchi. No rales.   Chest:      Chest wall: Not tender to palpatation.   Cardiovascular:      PMI at left midclavicular line. Normal rate. Regular rhythm. Normal S1. Normal S2.       Murmurs: There is no murmur.      No gallop.  No click. No rub.   Pulses:     Intact distal pulses.   Edema:     Peripheral edema absent.   Abdominal:      Tenderness: There is no abdominal  tenderness.   Musculoskeletal: Normal range of motion.         General: No tenderness.      Cervical back: Normal range of motion. Skin:     General: Skin is warm and dry.   Neurological:      General: No focal deficit present.      Mental Status: Alert and oriented to person, place and time.   Psychiatric:         Behavior: Behavior is cooperative.           ASSESSMENT AND PLAN  Diagnoses and all orders for this visit:  Essential hypertension  CAD S/P percutaneous coronary angioplasty  History of MI (myocardial infarction)  HTN (hypertension), benign  Mixed hyperlipidemia  S/P TAVR (transcatheter aortic valve replacement)  NSTEMI (non-ST elevated myocardial infarction) (Multi)  BMI 31.0-31.9,adult  Chronic obstructive pulmonary disease, unspecified COPD type (Multi)  Former smoker  Gastroesophageal reflux disease without esophagitis        IMaximo RN   am scribing for, and in the presence of Dr. Antoine Glynn MD  .    I, Dr. Antoine Glynn MD  , personally performed the services described in the documentation as scribed by Maximo Alvarenga RN   in my presence, and confirm it is both accurate and complete.      Dr. Antoine Glynn MD  Thank you for allowing me to participate in the care of this patient. Please do not hesitate to contact me with any further questions or concerns.

## 2025-03-26 ENCOUNTER — PATIENT OUTREACH (OUTPATIENT)
Dept: CARDIOLOGY | Facility: CLINIC | Age: 65
End: 2025-03-26
Payer: COMMERCIAL

## 2025-03-26 NOTE — PROGRESS NOTES
Call regarding appt. With cardiology on 3/24/25after hospitalization.  At time of outreach call the patient feels as if their condition has improved since last visit.  Reviewed the PCP appointment with the pt and addressed any questions or concerns. Patient states he has had no difficulty with [facility]. Patient leaves for a vacation in a few days

## 2025-04-14 ENCOUNTER — OFFICE VISIT (OUTPATIENT)
Dept: URGENT CARE | Age: 65
End: 2025-04-14
Payer: MEDICARE

## 2025-04-14 VITALS
HEIGHT: 68 IN | WEIGHT: 200 LBS | HEART RATE: 64 BPM | TEMPERATURE: 98.1 F | SYSTOLIC BLOOD PRESSURE: 136 MMHG | OXYGEN SATURATION: 96 % | DIASTOLIC BLOOD PRESSURE: 87 MMHG | RESPIRATION RATE: 20 BRPM | BODY MASS INDEX: 30.31 KG/M2

## 2025-04-14 DIAGNOSIS — J06.9 UPPER RESPIRATORY TRACT INFECTION, UNSPECIFIED TYPE: Primary | ICD-10-CM

## 2025-04-14 DIAGNOSIS — J44.9 CHRONIC OBSTRUCTIVE PULMONARY DISEASE, UNSPECIFIED COPD TYPE (MULTI): ICD-10-CM

## 2025-04-14 PROCEDURE — 1126F AMNT PAIN NOTED NONE PRSNT: CPT | Performed by: PHYSICIAN ASSISTANT

## 2025-04-14 PROCEDURE — 3008F BODY MASS INDEX DOCD: CPT | Performed by: PHYSICIAN ASSISTANT

## 2025-04-14 PROCEDURE — 1157F ADVNC CARE PLAN IN RCRD: CPT | Performed by: PHYSICIAN ASSISTANT

## 2025-04-14 PROCEDURE — 99214 OFFICE O/P EST MOD 30 MIN: CPT | Performed by: PHYSICIAN ASSISTANT

## 2025-04-14 PROCEDURE — 3079F DIAST BP 80-89 MM HG: CPT | Performed by: PHYSICIAN ASSISTANT

## 2025-04-14 PROCEDURE — 3075F SYST BP GE 130 - 139MM HG: CPT | Performed by: PHYSICIAN ASSISTANT

## 2025-04-14 PROCEDURE — 1123F ACP DISCUSS/DSCN MKR DOCD: CPT | Performed by: PHYSICIAN ASSISTANT

## 2025-04-14 RX ORDER — BROMPHENIRAMINE MALEATE, PSEUDOEPHEDRINE HYDROCHLORIDE, AND DEXTROMETHORPHAN HYDROBROMIDE 2; 30; 10 MG/5ML; MG/5ML; MG/5ML
5 SYRUP ORAL EVERY 4 HOURS PRN
Qty: 120 ML | Refills: 0 | Status: SHIPPED | OUTPATIENT
Start: 2025-04-14

## 2025-04-14 RX ORDER — AZITHROMYCIN 250 MG/1
TABLET, FILM COATED ORAL
Qty: 6 TABLET | Refills: 0 | Status: SHIPPED | OUTPATIENT
Start: 2025-04-14

## 2025-04-14 RX ORDER — METHYLPREDNISOLONE 4 MG/1
TABLET ORAL
Qty: 21 TABLET | Refills: 0 | Status: SHIPPED | OUTPATIENT
Start: 2025-04-14

## 2025-04-14 ASSESSMENT — PAIN SCALES - GENERAL: PAINLEVEL_OUTOF10: 0-NO PAIN

## 2025-04-14 NOTE — PROGRESS NOTES
"Subjective   Patient ID: Emiliano Mann is a 64 y.o. male who presents for Cough, Earache (Bilateral ears x 9 days ), and Nasal Congestion.  HPI  Presents for evaluation of URI.  Symptoms including cough, congestion, ear pain, and headache have been present for over a week and refractory to OTC meds.  No fever, chills, nausea, vomiting, abdominal pain, CP, or SOB.  No exacerbating factors    Review of Systems    Constitutional:  See HPI   ENT: See HPI  Respiratory: See HPI  Neurologic:  Alert and oriented X4, No numbness, No tingling.    All other systems are negative     Objective     /87 (BP Location: Left arm, Patient Position: Sitting)   Pulse 64   Temp 36.7 °C (98.1 °F) (Oral)   Resp 20   Ht 1.727 m (5' 8\")   Wt 90.7 kg (200 lb)   SpO2 96%   BMI 30.41 kg/m²     Physical Exam    General:  Alert and oriented, No acute distress.    Eye:  Pupils are equal, round and reactive to light, Normal conjunctiva.    HENT:  Normocephalic, bilateral tympanic membranes and canals unremarkable; unremarkable oropharynx; no cervical adenopathy  Neck:  Supple    Respiratory: Respirations are non-labored; LCTA bilaterally  Musculoskeletal: Normal ROM and strength  Integumentary:  Warm, Dry, Intact, No pallor, No rash.    Neurologic:  Alert, Oriented, Normal sensory, Cranial Nerves II-XII are grossly intact  Psychiatric:  Cooperative, Appropriate mood & affect.    Assessment/Plan   Exam is consistent with an upper respiratory infection in the setting of COPD.  Prescription for Z-Geoffrey, Medrol, and Bromfed.  Dangers of blood thinners and steroids reviewed.  Patient's clinical presentation is otherwise unremarkable at this time. Patient is discharged with instructions to follow-up with primary care or seek emergency medical attention for worsening symptoms or any new concerns.  Problem List Items Addressed This Visit       Chronic obstructive pulmonary disease (Multi)    Relevant Medications    azithromycin (Zithromax) 250 " mg tablet    brompheniramine-pseudoeph-DM (Bromfed DM) 2-30-10 mg/5 mL syrup    methylPREDNISolone (Medrol Dospak) 4 mg tablets     Other Visit Diagnoses       Upper respiratory tract infection, unspecified type    -  Primary    Relevant Medications    azithromycin (Zithromax) 250 mg tablet    brompheniramine-pseudoeph-DM (Bromfed DM) 2-30-10 mg/5 mL syrup    methylPREDNISolone (Medrol Dospak) 4 mg tablets            Final diagnoses:   [J06.9] Upper respiratory tract infection, unspecified type   [J44.9] Chronic obstructive pulmonary disease, unspecified COPD type (Multi)

## 2025-04-22 DIAGNOSIS — Z98.61 CAD S/P PERCUTANEOUS CORONARY ANGIOPLASTY: ICD-10-CM

## 2025-04-22 DIAGNOSIS — I25.10 CAD S/P PERCUTANEOUS CORONARY ANGIOPLASTY: ICD-10-CM

## 2025-04-22 NOTE — TELEPHONE ENCOUNTER
Patient called stating that since has been on Plavix he has been having dizziness, stomach pain, nausea and diarrhea.  He states he can not stay on this medication any longer and would like to know what he can do.  [phone].  Fwd to [facility] to discuss with Dr. Antoine Harrison MD

## 2025-04-23 ENCOUNTER — PATIENT OUTREACH (OUTPATIENT)
Dept: CARDIOLOGY | Facility: CLINIC | Age: 65
End: 2025-04-23
Payer: COMMERCIAL

## 2025-04-23 RX ORDER — PRASUGREL 10 MG/1
TABLET, FILM COATED ORAL
Qty: 90 TABLET | Refills: 3 | Status: SHIPPED | OUTPATIENT
Start: 2025-04-23

## 2025-04-23 NOTE — TELEPHONE ENCOUNTER
Per Dr. Antoine Harrison , STOP Plavix, START Effient 10 mg.  On day 1, take 6 tablets, then 1 tablet daily.  Call placed to patient and advised.  Patient verbalized understanding.  Rx sent to [facility] on [address]

## 2025-04-23 NOTE — PROGRESS NOTES
Successful outreach to patient regarding hospitalization as patient continues TCM program.   At time of outreach call the patient feels as if their condition has improved since initial visit with PCP or specialist.  Questions or concerns addressed at this time with patient.   Provided contact information to patient if any further non-emergent needs arise.      Patient stopped taking his Plavix message left with Dr. Harrison. Reviewed the importance of medications patient states understanding

## 2025-05-06 DIAGNOSIS — I10 HTN (HYPERTENSION), BENIGN: ICD-10-CM

## 2025-05-06 RX ORDER — CARVEDILOL 25 MG/1
25 TABLET ORAL 2 TIMES DAILY
Qty: 180 TABLET | Refills: 3 | Status: SHIPPED | OUTPATIENT
Start: 2025-05-06

## 2025-05-06 NOTE — TELEPHONE ENCOUNTER
Patient called and requested a refill of Carvedilol to be sent to [facility].     Received request for prescription refills for patient.   Patient follows with Dr. Antoine Harrison MD, [facility]      Request is for Carvedilol   Is patient currently on medication yes    Last OV 3/24/25  Next OV 3/23/26    Pended for signing and sent to provider

## 2025-05-29 ENCOUNTER — OFFICE VISIT (OUTPATIENT)
Dept: ORTHOPEDIC SURGERY | Facility: CLINIC | Age: 65
End: 2025-05-29
Payer: MEDICARE

## 2025-05-29 DIAGNOSIS — M19.012 PRIMARY OSTEOARTHRITIS OF LEFT SHOULDER: ICD-10-CM

## 2025-05-29 DIAGNOSIS — M70.72 BURSITIS OF OTHER BURSA OF LEFT HIP: Primary | ICD-10-CM

## 2025-05-29 PROCEDURE — 2500000004 HC RX 250 GENERAL PHARMACY W/ HCPCS (ALT 636 FOR OP/ED): Performed by: ORTHOPAEDIC SURGERY

## 2025-05-29 PROCEDURE — 99212 OFFICE O/P EST SF 10 MIN: CPT | Mod: 25 | Performed by: ORTHOPAEDIC SURGERY

## 2025-05-29 PROCEDURE — 20610 DRAIN/INJ JOINT/BURSA W/O US: CPT | Mod: LT | Performed by: ORTHOPAEDIC SURGERY

## 2025-05-29 RX ORDER — LIDOCAINE HYDROCHLORIDE 10 MG/ML
5 INJECTION, SOLUTION INFILTRATION; PERINEURAL
Status: COMPLETED | OUTPATIENT
Start: 2025-05-29 | End: 2025-05-29

## 2025-05-29 RX ORDER — BETAMETHASONE SODIUM PHOSPHATE AND BETAMETHASONE ACETATE 3; 3 MG/ML; MG/ML
2 INJECTION, SUSPENSION INTRA-ARTICULAR; INTRALESIONAL; INTRAMUSCULAR; SOFT TISSUE
Status: COMPLETED | OUTPATIENT
Start: 2025-05-29 | End: 2025-05-29

## 2025-05-29 RX ADMIN — BETAMETHASONE ACETATE AND BETAMETHASONE SODIUM PHOSPHATE 2 ML: 3; 3 INJECTION, SUSPENSION INTRA-ARTICULAR; INTRALESIONAL; INTRAMUSCULAR; SOFT TISSUE at 15:26

## 2025-05-29 RX ADMIN — LIDOCAINE HYDROCHLORIDE 5 ML: 10 INJECTION, SOLUTION INFILTRATION; PERINEURAL at 15:26

## 2025-05-29 NOTE — PROGRESS NOTES
History of present illness: History of left hip trochanteric bursitis over the last couple of months follows up today for evaluation he also has known moderate arthritic change left shoulder that does well with fluoroscopy guided injections for about 4 months at a time    Physical exam:    General: No acute distress or breathing difficulty or discomfort, pleasant and cooperative with the examination.    Extremities: Left hip is examined    The affected left hip was examined and inspected.  There was tenderness to touch along the groin and over the lateral aspect of the hip over the bursal area.  Hip joint moves freely.    There was no pain over the groin area to internal/external rotation abduction.    Palpable reproducible pain was reproduced with palpation over the lateral trochanteric process.  There was a tight IT band with a positive Cody sign.      The skin was intact without breakdown or open wound.  Old incisions of present were all healed.  There was mild crepitus seen with internal and external rotation and range of motion without evidence of gross instability.    Inspection of the low back showed normal curvature integrity of the skin.  The strength and stability of the low back and ligaments were within normal limits.    There was a normal straight leg raise with no foot drop, numbness or tingling in the bilateral lower extremities.  Sensation, reflexes and pulses in the foot and ankle are preserved and present.  There was no obvious effusion.    Range of motion showed flexion to beyond 100 degrees degrees, abduction to 30 degrees, external rotation to 15 degrees and 18 degrees of internal rotation.  The patient had the ability to bear weight but with discomfort.  The patient's gait antalgic  secondary to discomfort      Before aspiration injection the risks of a cortisone injection including infection, local skin irritation, skin atrophy, calcification, continued pain and discomfort, elevated blood  sugar, burning, failure to relieve pain, possible late infection were discussed with the patient.    Postprocedure discomfort can be alleviated with additional medications, ice, elevation, rest over the first 24 hours as recommended.    Patient verbalized understanding and wanted to proceed with the planned procedure.    After informed consent was provided and allergies verified, the patient was positioned appropriately on the  bed.  The left hip to be aspirated and injected was prepped and draped in a sterile fashion.  The skin was anesthetized with ethyl chloride spray.  A joint aspiration was to be performed an 18-gauge needle was used otherwise a 22-gauge needle was used to inject the appropriate joint.    Joint injection was performed with a mixture of 5 cc 1% lidocaine plain and 2 cc Celestone Soluspan 6 mg per mL.  The needle was removed and the puncture site closed and sealed with a Band-Aid.  The patient tolerated the procedure well.    Left shoulder is examined    The left shoulder was inspected and was found to have no obvious deformity.  There was tenderness to touch over the lateral edges of the shoulder over the rotator cuff insertion.  Active forward flexion 110 degrees, external rotation to 50 degrees, abduction to 45 degrees, and internal rotation to the level of L2.    At this time the shoulder is neurovascular tact and neurosensory intact.  Motor intact C5-T1.  There was no obvious neck pain or radiculopathy noted.  There was no gross instability or adhesive capsulitis symptoms.  There was no evidence of apprehension or apprehension suppression.    Strength was tested in 4 planes with weakness in the supraspinatus strength testing and external rotation position.  There was no strength deficit in internal rotation.  Impingement signs were positive both supine and standing for impingement test type I and II.  There was mild pain over the bicipital groove with a positive speeds sign    Diagnostic  studies: Hip x-rays from October 2024 showed just mild to moderate arthritic change left hip we also reviewed the MRI of the left shoulder from July 24, 2024 showing moderate arthritic change    Impression: Left hip moderate arthritis with hip trochanteric bursa now for injection    Moderate shoulder arthritis now will schedule for fluoroscopy guided injection left    Plan: Hip bursa injection therapy stretching IT band program    Fluoroscopy injection left shoulder I will see him back in the office for repeat eval in 4 to 6 weeks if his hip does not improve MRI would be in order    L Inj/Asp: L greater trochanteric bursa on 5/29/2025 3:26 PM  Indications: pain and diagnostic evaluation  Details: 25 G needle, lateral approach  Medications: 5 mL lidocaine 10 mg/mL (1 %); 2 mL betamethasone acet,sod phos 6 mg/mL  Outcome: tolerated well, no immediate complications  Procedure, treatment alternatives, risks and benefits explained, specific risks discussed. Consent was given by the patient. Immediately prior to procedure a time out was called to verify the correct patient, procedure, equipment, support staff and site/side marked as required. Patient was prepped and draped in the usual sterile fashion.

## 2025-06-06 ENCOUNTER — EVALUATION (OUTPATIENT)
Dept: PHYSICAL THERAPY | Facility: CLINIC | Age: 65
End: 2025-06-06
Payer: MEDICARE

## 2025-06-06 DIAGNOSIS — M70.72 BURSITIS OF OTHER BURSA OF LEFT HIP: ICD-10-CM

## 2025-06-06 PROCEDURE — 97161 PT EVAL LOW COMPLEX 20 MIN: CPT | Mod: GP

## 2025-06-06 PROCEDURE — 97110 THERAPEUTIC EXERCISES: CPT | Mod: GP

## 2025-06-06 NOTE — PROGRESS NOTES
Physical Therapy Evaluation    Patient Name: Emiliano Mann  MRN: [id number]  Time Calculation  Start Time: 1015  Stop Time: 1031  Time Calculation (min): 16 min  PT Evaluation Time Entry  PT Evaluation (Low) Time Entry: 8  PT Therapeutic Procedures Time Entry  Therapeutic Exercise Time Entry: 8                   Current Problem  1. Bursitis of other bursa of left hip  Referral to Physical Therapy        Insurance    Insurance reviewed   Visit number: 1  Approved number of visits: BMN   MEDICARE/ [employer] M[employer] 2410 ($0 USED ) COPAY 0  (MET) COVERAGE 80/100 OOP (MET) P.M TO FOLLOW MEDICARE NO AUTH REQ [id number]     Subjective   General:  Pt reports to the clinic with complaint of L hip pain that started on a few years ago with no specific JYOTSNA, but is mostly aggravated with long walking he does every day (about a mile) and sleeping on his side. Pt did receive steroid injection with Dr. Jesus Brunner on 5/29/25 with 80% relief. Pt denies radicular Sx. Denies any falls. Pt denies any painful popping, clicking, catching or buckling. Pt denies any /GI changes, fever/night sweats/pain, loss of appetite, or unintentional weight loss/gain.     Occupation: retired     Lifestyle: walking (1 mile a day)     Living Environment: lives with family       Precautions:  None     Pain:  Description of Symptoms:    Pain: ache   Location: L hip   Severity: current 0/10  Best 0/10  Worst 7-8/10   Intensity Variation: intensifies with walking   Duration of Pain: intermittent  Aggravating Factors: standing, prolonged sitting, walking, and lying down  Relieving Factors: steroid injection, OTC pain relief     Reviewed medical screening form with pt and medical screening assessed    Imaging:   No updated imaging     Objective     Hip Musculoskeletal Exam    Range of Motion    Right      Right hip range of motion is within functional limits.     Left      Active external rotation: 35.       Passive external rotation: 40.     Range  of motion additional comments: All other planes WNL     LE flexibility:  Hip flexors:   R Minimal limitation  L Minimal limitation    Piriformis:   R WFL L Minimal limitation          Strength    Right      Right hip strength is normal.     Left      Flexion: 4+/5.       Internal rotation: 5/5.       External rotation: 5/5.       Adduction: 4-/5.       Abduction: 4-/5.         Outcome Measures:  Other Measures  Disability of Arm Shoulder Hand (DASH): 49     Treatment:   Measures for eval     EDUCATION/HEP:  Access Code: [email or web address]  URL: [email or web address]  Date: 06/06/2025  Prepared by: Alexandrea Baxter    Exercises  - Supine Active Straight Leg Raise  - 1 x daily - 4 x weekly - 2 sets - 10 reps  - Supine Bridge  - 1 x daily - 4 x weekly - 2 sets - 10 reps  - Supine Hip Adduction Isometric with Ball  - 1 x daily - 4 x weekly - 15 reps - 5 hold  - Supine Piriformis Stretch with Foot on Ground  - 1 x daily - 4 x weekly - 2 sets - 30 hold  - Clamshell  - 1 x daily - 4 x weekly - 2 sets - 10 reps    Assessment:  Pt is here for an evaluation of L hip pain that has resolved after receiving steroid injection. Pt demonstrates full range of motion. Strength is diminished slightly on LLE with hip flexion. Tightness noted in: L piriformis. Pt would like to trail HEP since injection resolved most of his pain. No formal PT needed at this time.     Clinical Presentation: Stable    Level of Complexity: Low     Goals:  Pt will be independent with advanced HEP   Pt will improve L LE strength to at least 4+/5 in order to facilitate improved stability with all functional mobility  Pt will demo full and symmetrical hip ROM to normalize mechanics with all functional mobility  Pt will negotiate flight of stairs mod I reciprocally without increased hip pain  Pt will ambulate without assistive device over community distance without increase in hip pain  Pt will ambulate community distances independent over varying  surfaces/inclines without increased L hip pain  Pt will improve LEFS score by at least 9 points (MCID) to indicate significant improvement in functional abilities.      Plan  PT eval only, f/u PRN; trial HEP     Skilled therapeutic intervention to address the above mentioned physical and functional impairments and limitations including, but not limited to: patient education, therapeutic exercise, therapeutic activity, manual therapy, body mechanics training, dry needling, blood flow restriction training, instrumented soft tissue mobilization, manual soft tissue mobilization, gait retraining, biofeedback, cryotherapy, electrical stimulation, home program development, hot pack, taping, neuromuscular re-education, self-care/home management, and vasopneumatic compression.

## 2025-06-10 ENCOUNTER — PATIENT OUTREACH (OUTPATIENT)
Dept: PRIMARY CARE | Facility: CLINIC | Age: 65
End: 2025-06-10
Payer: COMMERCIAL

## 2025-06-10 ENCOUNTER — OFFICE VISIT (OUTPATIENT)
Dept: URGENT CARE | Age: 65
End: 2025-06-10
Payer: MEDICARE

## 2025-06-10 VITALS
SYSTOLIC BLOOD PRESSURE: 118 MMHG | DIASTOLIC BLOOD PRESSURE: 82 MMHG | RESPIRATION RATE: 18 BRPM | HEART RATE: 77 BPM | HEIGHT: 68 IN | WEIGHT: 200 LBS | BODY MASS INDEX: 30.31 KG/M2 | OXYGEN SATURATION: 95 % | TEMPERATURE: 96.5 F

## 2025-06-10 DIAGNOSIS — J20.9 ACUTE BRONCHITIS WITH CHRONIC OBSTRUCTIVE PULMONARY DISEASE (COPD) (MULTI): Primary | ICD-10-CM

## 2025-06-10 DIAGNOSIS — J06.9 UPPER RESPIRATORY TRACT INFECTION, UNSPECIFIED TYPE: ICD-10-CM

## 2025-06-10 DIAGNOSIS — J44.0 ACUTE BRONCHITIS WITH CHRONIC OBSTRUCTIVE PULMONARY DISEASE (COPD) (MULTI): Primary | ICD-10-CM

## 2025-06-10 RX ORDER — PREDNISONE 10 MG/1
TABLET ORAL
Qty: 30 TABLET | Refills: 0 | Status: SHIPPED | OUTPATIENT
Start: 2025-06-10

## 2025-06-10 RX ORDER — BROMPHENIRAMINE MALEATE, PSEUDOEPHEDRINE HYDROCHLORIDE, AND DEXTROMETHORPHAN HYDROBROMIDE 2; 30; 10 MG/5ML; MG/5ML; MG/5ML
5 SYRUP ORAL EVERY 4 HOURS PRN
Qty: 120 ML | Refills: 0 | Status: SHIPPED | OUTPATIENT
Start: 2025-06-10

## 2025-06-10 RX ORDER — AZITHROMYCIN 250 MG/1
TABLET, FILM COATED ORAL
Qty: 6 TABLET | Refills: 0 | Status: SHIPPED | OUTPATIENT
Start: 2025-06-10

## 2025-06-10 ASSESSMENT — PATIENT HEALTH QUESTIONNAIRE - PHQ9
2. FEELING DOWN, DEPRESSED OR HOPELESS: NOT AT ALL
SUM OF ALL RESPONSES TO PHQ9 QUESTIONS 1 & 2: 0
1. LITTLE INTEREST OR PLEASURE IN DOING THINGS: NOT AT ALL

## 2025-06-10 ASSESSMENT — PAIN SCALES - GENERAL: PAINLEVEL_OUTOF10: 0-NO PAIN

## 2025-06-10 NOTE — PROGRESS NOTES
"Subjective   Patient ID: Emiliano Mann is a 65 y.o. male who presents for Cough (Patient states a lot of mucus coming up /Started 5 days ago /Does not want testing ).  HPI  Presents for evaluation of URI.  Symptoms including cough, congestion, chest congestion, and malaise have been present for several days and refractory to OTC meds.  No fever, chills, nausea, vomiting, abdominal pain, CP, or SOB.  No exacerbating factors.  Patient does have COPD.  No other complaints.    Review of Systems    Constitutional:  See HPI   ENT: See HPI  Respiratory: See HPI  Neurologic:  Alert and oriented X4, No numbness, No tingling.    All other systems are negative     Objective     /82 (BP Location: Left arm, Patient Position: Sitting)   Pulse 77   Temp 35.8 °C (96.5 °F)   Resp 18   Ht 1.727 m (5' 8\")   Wt 90.7 kg (200 lb)   SpO2 95%   BMI 30.41 kg/m²     Physical Exam    General:  Alert and oriented, No acute distress.    Eye:  Pupils are equal, round and reactive to light, Normal conjunctiva.    HENT:  Normocephalic, unremarkable oropharynx; no cervical adenopathy; no sinus tenderness  Neck:  Supple    Respiratory: Respirations are non-labored; bilateral diffuse wheezing and rhonchi, most pronounced at the bases; no rales  Musculoskeletal: Normal ROM and strength  Integumentary:  Warm, Dry, Intact, No pallor, No rash.    Neurologic:  Alert, Oriented, Normal sensory, Cranial Nerves II-XII are grossly intact  Psychiatric:  Cooperative, Appropriate mood & affect.    Assessment/Plan   Exam is consistent with acute COPD bronchitis and upper respiratory infection.  Prescriptions for Z-Geoffrey, prednisone taper, and Bromfed.  Rest and supportive care.  Patient's clinical presentation is otherwise unremarkable at this time. Patient is discharged with instructions to follow-up with primary care or seek emergency medical attention for worsening symptoms or any new concerns.  Problem List Items Addressed This Visit    None  Visit " Diagnoses         Acute bronchitis with chronic obstructive pulmonary disease (COPD) (Multi)    -  Primary    Relevant Medications    azithromycin (Zithromax) 250 mg tablet    brompheniramine-pseudoeph-DM (Bromfed DM) 2-30-10 mg/5 mL syrup    predniSONE (Deltasone) 10 mg tablet      Upper respiratory tract infection, unspecified type        Relevant Medications    azithromycin (Zithromax) 250 mg tablet    brompheniramine-pseudoeph-DM (Bromfed DM) 2-30-10 mg/5 mL syrup    predniSONE (Deltasone) 10 mg tablet            Final diagnoses:   [J44.0, J20.9] Acute bronchitis with chronic obstructive pulmonary disease (COPD) (Multi)   [J06.9] Upper respiratory tract infection, unspecified type

## 2025-06-20 ENCOUNTER — HOSPITAL ENCOUNTER (OUTPATIENT)
Dept: RADIOLOGY | Facility: HOSPITAL | Age: 65
Discharge: HOME | End: 2025-06-20
Payer: MEDICARE

## 2025-06-20 DIAGNOSIS — M19.012 PRIMARY OSTEOARTHRITIS OF LEFT SHOULDER: ICD-10-CM

## 2025-06-20 PROCEDURE — 96372 THER/PROPH/DIAG INJ SC/IM: CPT | Performed by: RADIOLOGY

## 2025-06-20 PROCEDURE — 2550000001 HC RX 255 CONTRASTS: Performed by: ORTHOPAEDIC SURGERY

## 2025-06-20 PROCEDURE — 2500000004 HC RX 250 GENERAL PHARMACY W/ HCPCS (ALT 636 FOR OP/ED): Mod: JZ | Performed by: RADIOLOGY

## 2025-06-20 PROCEDURE — 77002 NEEDLE LOCALIZATION BY XRAY: CPT | Mod: LT

## 2025-06-20 RX ORDER — LIDOCAINE HYDROCHLORIDE 20 MG/ML
20 INJECTION, SOLUTION INFILTRATION; PERINEURAL ONCE
Status: COMPLETED | OUTPATIENT
Start: 2025-06-20 | End: 2025-06-20

## 2025-06-20 RX ORDER — BUPIVACAINE HYDROCHLORIDE 5 MG/ML
3 INJECTION, SOLUTION EPIDURAL; INTRACAUDAL; PERINEURAL ONCE
Status: COMPLETED | OUTPATIENT
Start: 2025-06-20 | End: 2025-06-20

## 2025-06-20 RX ORDER — METHYLPREDNISOLONE ACETATE 40 MG/ML
40 INJECTION, SUSPENSION INTRA-ARTICULAR; INTRALESIONAL; INTRAMUSCULAR; SOFT TISSUE ONCE
Status: COMPLETED | OUTPATIENT
Start: 2025-06-20 | End: 2025-06-20

## 2025-06-20 RX ADMIN — METHYLPREDNISOLONE ACETATE 40 MG: 40 INJECTION, SUSPENSION INTRA-ARTICULAR; INTRALESIONAL; INTRAMUSCULAR; INTRASYNOVIAL; SOFT TISSUE at 08:45

## 2025-06-20 RX ADMIN — LIDOCAINE HYDROCHLORIDE 7 ML: 20 INJECTION, SOLUTION INFILTRATION; PERINEURAL at 08:44

## 2025-06-20 RX ADMIN — BUPIVACAINE HYDROCHLORIDE 3 ML: 5 INJECTION, SOLUTION EPIDURAL; INTRACAUDAL; PERINEURAL at 08:43

## 2025-06-20 RX ADMIN — IOHEXOL 3 ML: 300 INJECTION, SOLUTION INTRAVENOUS at 08:45

## 2025-07-26 ENCOUNTER — OFFICE VISIT (OUTPATIENT)
Dept: URGENT CARE | Age: 65
End: 2025-07-26
Payer: MEDICARE

## 2025-07-26 VITALS
TEMPERATURE: 97.8 F | SYSTOLIC BLOOD PRESSURE: 160 MMHG | RESPIRATION RATE: 18 BRPM | DIASTOLIC BLOOD PRESSURE: 86 MMHG | BODY MASS INDEX: 31.07 KG/M2 | HEIGHT: 68 IN | HEART RATE: 68 BPM | OXYGEN SATURATION: 97 % | WEIGHT: 205 LBS

## 2025-07-26 DIAGNOSIS — K21.9 GASTROESOPHAGEAL REFLUX DISEASE WITHOUT ESOPHAGITIS: Primary | ICD-10-CM

## 2025-07-26 RX ORDER — LANSOPRAZOLE 30 MG/1
30 CAPSULE, DELAYED RELEASE ORAL 2 TIMES DAILY
Qty: 60 CAPSULE | Refills: 0 | Status: SHIPPED | OUTPATIENT
Start: 2025-07-26 | End: 2025-08-25

## 2025-07-26 NOTE — PROGRESS NOTES
"Subjective   Patient ID: Emiliano Mann is a 65 y.o. male. They present today with a chief complaint of GERD (Pt hx of GERD. States bad indigestion for weeks now. Taking tums and omeprazole constantly but still wakes up nightly with stomach pains and reflux. ).      History of Present Illness  GERD  Paitent complains of heartburn. This has been associated with abdominal bloating, belching, and heartburn.  He denies chest pain, difficulty swallowing, hematemesis, melena, shortness of breath, and unexpected weight loss. Symptoms have been present for 2 months. He denies dysphagia. He has not lost weight. He denies melena, hematochezia, hematemesis, and coffee ground emesis. Medical therapy in the past has included proton pump inhibitors and tums.          History provided by:  Patient and medical records        Past Medical History  Allergies as of 07/26/2025 - Reviewed 07/26/2025   Allergen Reaction Noted    Ezetimibe Myalgia 02/22/2022    Lisinopril Cough 04/26/2023    Losartan potassium Dizziness 09/08/2023    Morphine Nausea/vomiting 10/23/2015    Statins-hmg-coa reductase inhibitors Myalgia 09/12/2019       Prescriptions Prior to Admission[1]     Current Medications[2]    Problem List[3]    Surgical History[4]     reports that he quit smoking about 25 years ago. His smoking use included cigarettes. He started smoking about 49 years ago. He has a 36 pack-year smoking history. He has never been exposed to tobacco smoke. He has never used smokeless tobacco. He reports that he does not currently use alcohol after a past usage of about 4.0 standard drinks of alcohol per week. He reports that he does not use drugs.    Review of Systems  As noted in HPI. ROS otherwise negative unless noted.       Objective    Vitals:    07/26/25 0926   BP: 160/86   Pulse: 68   Resp: 18   Temp: 36.6 °C (97.8 °F)   TempSrc: Oral   SpO2: 97%   Weight: 93 kg (205 lb)   Height: 1.727 m (5' 8\")     No LMP for male patient.    Physical " Exam  Vitals and nursing note reviewed.   Constitutional:       General: He is not in acute distress.     Appearance: Normal appearance. He is ill-appearing.   HENT:      Head: Normocephalic and atraumatic.     Cardiovascular:      Rate and Rhythm: Normal rate and regular rhythm.   Pulmonary:      Effort: Pulmonary effort is normal.      Breath sounds: Normal breath sounds.   Abdominal:      General: Abdomen is protuberant. Bowel sounds are normal.      Palpations: There is no shifting dullness, fluid wave, hepatomegaly or splenomegaly.      Tenderness: There is no abdominal tenderness.     Musculoskeletal:      Cervical back: Normal range of motion and neck supple.     Skin:     General: Skin is warm and dry.      Capillary Refill: Capillary refill takes less than 2 seconds.     Neurological:      Mental Status: He is alert and oriented to person, place, and time.     Psychiatric:         Behavior: Behavior normal.           Procedures    Point of Care Test & Imaging Results from this visit  Results for orders placed or performed during the hospital encounter of 03/10/25   CBC and Auto Differential    Collection Time: 03/10/25  6:52 AM   Result Value Ref Range    WBC 9.2 4.4 - 11.3 x10*3/uL    nRBC 0.0 0.0 - 0.0 /100 WBCs    RBC 5.02 4.50 - 5.90 x10*6/uL    Hemoglobin 15.7 13.5 - 17.5 g/dL    Hematocrit 44.5 41.0 - 52.0 %    MCV 89 80 - 100 fL    MCH 31.3 26.0 - 34.0 pg    MCHC 35.3 32.0 - 36.0 g/dL    RDW 11.9 11.5 - 14.5 %    Platelets 254 150 - 450 x10*3/uL    Neutrophils % 59.5 40.0 - 80.0 %    Immature Granulocytes %, Automated 0.5 0.0 - 0.9 %    Lymphocytes % 27.2 13.0 - 44.0 %    Monocytes % 10.2 2.0 - 10.0 %    Eosinophils % 2.2 0.0 - 6.0 %    Basophils % 0.4 0.0 - 2.0 %    Neutrophils Absolute 5.49 1.20 - 7.70 x10*3/uL    Immature Granulocytes Absolute, Automated 0.05 0.00 - 0.70 x10*3/uL    Lymphocytes Absolute 2.51 1.20 - 4.80 x10*3/uL    Monocytes Absolute 0.94 0.10 - 1.00 x10*3/uL    Eosinophils  Absolute 0.20 0.00 - 0.70 x10*3/uL    Basophils Absolute 0.04 0.00 - 0.10 x10*3/uL   Comprehensive Metabolic Panel    Collection Time: 03/10/25  6:52 AM   Result Value Ref Range    Glucose 98 74 - 99 mg/dL    Sodium 137 136 - 145 mmol/L    Potassium 3.8 3.5 - 5.3 mmol/L    Chloride 104 98 - 107 mmol/L    Bicarbonate 24 21 - 32 mmol/L    Anion Gap 13 10 - 20 mmol/L    Urea Nitrogen 14 6 - 23 mg/dL    Creatinine 1.04 0.50 - 1.30 mg/dL    eGFR 79 >60 mL/min/1.73m*2    Calcium 9.2 8.6 - 10.3 mg/dL    Albumin 4.3 3.4 - 5.0 g/dL    Alkaline Phosphatase 53 33 - 136 U/L    Total Protein 7.0 6.4 - 8.2 g/dL    AST 16 9 - 39 U/L    Bilirubin, Total 0.6 0.0 - 1.2 mg/dL    ALT 18 10 - 52 U/L   Magnesium    Collection Time: 03/10/25  6:52 AM   Result Value Ref Range    Magnesium 2.03 1.60 - 2.40 mg/dL   Lactate    Collection Time: 03/10/25  6:52 AM   Result Value Ref Range    Lactate 1.8 0.4 - 2.0 mmol/L   Protime-INR    Collection Time: 03/10/25  6:52 AM   Result Value Ref Range    Protime 10.6 9.8 - 12.4 seconds    INR 1.0 0.9 - 1.1   B-Type Natriuretic Peptide    Collection Time: 03/10/25  6:52 AM   Result Value Ref Range    BNP 72 0 - 99 pg/mL   Troponin I, High Sensitivity, Initial    Collection Time: 03/10/25  6:52 AM   Result Value Ref Range    Troponin I, High Sensitivity 439 (HH) 0 - 20 ng/L   aPTT - baseline    Collection Time: 03/10/25  6:52 AM   Result Value Ref Range    aPTT 31 26 - 36 seconds   ECG 12 lead    Collection Time: 03/10/25  7:13 AM   Result Value Ref Range    Ventricular Rate 64 BPM    Atrial Rate 64 BPM    OR Interval 188 ms    QRS Duration 112 ms    QT Interval 404 ms    QTC Calculation(Bazett) 416 ms    P Axis 63 degrees    R Axis 27 degrees    T Axis -1 degrees    QRS Count 11 beats    Q Onset 210 ms    P Onset 116 ms    P Offset 174 ms    T Offset 412 ms    QTC Fredericia 412 ms   Troponin, High Sensitivity, 1 Hour    Collection Time: 03/10/25  7:52 AM   Result Value Ref Range    Troponin I, High  Sensitivity 423 (HH) 0 - 20 ng/L   Lipid Panel    Collection Time: 03/10/25  7:52 AM   Result Value Ref Range    Cholesterol 270 (H) 0 - 199 mg/dL    HDL-Cholesterol 45.2 mg/dL    Cholesterol/HDL Ratio 6.0     LDL Calculated 174 (H) <=99 mg/dL    VLDL 51 (H) 0 - 40 mg/dL    Triglycerides 255 (H) 0 - 149 mg/dL    Non HDL Cholesterol 225 (H) 0 - 149 mg/dL   ECG 12 lead    Collection Time: 03/10/25  8:17 AM   Result Value Ref Range    Ventricular Rate 56 BPM    Atrial Rate 56 BPM    AL Interval 180 ms    QRS Duration 102 ms    QT Interval 440 ms    QTC Calculation(Bazett) 424 ms    P Axis 71 degrees    R Axis 32 degrees    T Axis 3 degrees    QRS Count 9 beats    Q Onset 210 ms    P Onset 120 ms    P Offset 178 ms    T Offset 430 ms    QTC Fredericia 430 ms   ACTIVATED CLOTTING TIME LOW    Collection Time: 03/10/25 10:46 AM   Result Value Ref Range    POCT Activated Clotting Time Low Range     Electrocardiogram 12 Lead    Collection Time: 03/10/25 11:57 AM   Result Value Ref Range    Ventricular Rate 66 BPM    Atrial Rate 66 BPM    AL Interval 192 ms    QRS Duration 114 ms    QT Interval 446 ms    QTC Calculation(Bazett) 467 ms    P Axis 78 degrees    R Axis 46 degrees    T Axis 28 degrees    QRS Count 11 beats    Q Onset 206 ms    P Onset 110 ms    P Offset 175 ms    T Offset 429 ms    QTC Fredericia 460 ms   Transthoracic Echo (TTE) Complete    Collection Time: 03/10/25  3:32 PM   Result Value Ref Range    AV mn grad 13 mmHg    AV pk eduar 2.70 m/s    LV Biplane EF 66 %    LVOT diam 2.00 cm    MV E/A ratio 0.68     LA vol index A/L 27.8 ml/m2    LV EF 60 %    RVSP 21.0 mmHg    LVIDd 5.16 cm    Aortic Valve Area by Continuity of Peak Velocity 0.89 cm2    AV pk grad 29 mmHg    Aortic Valve Area by Continuity of VTI 0.92 cm2    LV A4C EF 70.2             Diagnostic study results (if any) were reviewed.  (If applicable) preliminary radiology reading: [none]    Assessment/Plan   Allergies, medications, history, and  pertinent labs/EKGs/Imaging reviewed.        Medical Decision Making  Risks, benefits, and alternatives of the medications and treatment plan prescribed today were discussed, and patient expressed understanding. Plan follow up as discussed or as needed if any worsening symptoms or change in condition. Reinforced red flags including (but not limited to): severe or worsening pain; difficulty swallowing; stiff neck; shortness of breath; coughing or vomiting blood; chest pain; and new or increased fever are indications to go to the Emergency Department.  At time of discharge patient was clinically well-appearing and HDS for outpatient management. The patient and/or family was educated regarding diagnosis, supportive care, OTC and Rx medications. The patient and/or family was given the opportunity to ask questions prior to discharge.  They verbalized understanding of my discussion of the plans for treatment, expected course, indications to return to  or seek further evaluation in ED, and the need for timely follow up as directed.   They were provided with a work/school excuse if requested. The after-visit summary was given to the patient and care instructions were reviewed with the patient. All questions were answered and the patient verbalized understanding of the plan of care for today.  Plan:  Recent visit notes reviewed  Meds as above  Increase clear fluids  Pcp follow up this week if not improving or worsening  ER visit anytime 24/7 for acute worsening or changing condition    Orders and Diagnoses  Diagnoses and all orders for this visit:  Gastroesophageal reflux disease without esophagitis  -     lansoprazole (Prevacid) 30 mg DR capsule; Take 1 capsule (30 mg) by mouth 2 times a day. Do not crush or chew. Take on an empty stomach.  -     simethicone (Gas-Ex) 125 mg tablet tablet; Take 1 tablet (125 mg) by mouth every 6 hours if needed (for gas, bloating and stomach upset).  -     Referral to Gastroenterology;  Future      Medical Admin Record      Follow Up Instructions  No follow-ups on file.    Patient disposition: Home  Birdie MarquisKVNG-CNP           [1] (Not in a hospital admission)   [2]   Current Outpatient Medications   Medication Sig Dispense Refill    amLODIPine (Norvasc) 10 mg tablet Take 1 tablet (10 mg) by mouth once daily. 90 tablet 3    ASCORBIC ACID, VITAMIN C, ORAL Take 282 mg by mouth once daily.      aspirin 81 mg chewable tablet Chew 1 tablet (81 mg) once daily. Note:  This should be taken every day. Do not fill before March 11, 2025.      azithromycin (Zithromax) 250 mg tablet 2 tabs po day 1; 1 tab po every day days 2-5 (Patient not taking: Reported on 6/10/2025) 6 tablet 0    azithromycin (Zithromax) 250 mg tablet 2 tabs po day 1; 1 tab po every day days 2-5 6 tablet 0    brompheniramine-pseudoeph-DM (Bromfed DM) 2-30-10 mg/5 mL syrup Take 5 mL by mouth every 4 hours if needed for allergies, congestion or cough. (Patient not taking: Reported on 6/10/2025) 120 mL 0    brompheniramine-pseudoeph-DM (Bromfed DM) 2-30-10 mg/5 mL syrup Take 5 mL by mouth every 4 hours if needed for allergies, congestion or cough. 120 mL 0    calcium carbonate EX (Tums Extra Strength) 300 mg (750 mg) chewable tablet Chew 600 mg as needed at bedtime for indigestion or heartburn.      carvedilol (Coreg) 25 mg tablet Take 1 tablet (25 mg) by mouth 2 times a day. 180 tablet 3    cholecalciferol (Vitamin D-3) 125 MCG (5000 UT) capsule Take 1 capsule (125 mcg) by mouth once daily.      clopidogrel (Plavix) 75 mg tablet Take 1 tablet (75 mg) by mouth once daily for 365 doses. Do not fill before March 11, 2025. 30 tablet 11    famotidine (Pepcid) 20 mg tablet Take 1 tablet (20 mg) by mouth once daily as needed for heartburn.      lansoprazole (Prevacid) 30 mg DR capsule Take 1 capsule (30 mg) by mouth 2 times a day. Do not crush or chew. Take on an empty stomach. 60 capsule 0    methylPREDNISolone (Medrol Dospak) 4 mg  tablets Take as directed on package. (Patient not taking: Reported on 6/10/2025) 21 tablet 0    multivitamin tablet Take 1 tablet by mouth 3 times a week.      nitroglycerin (Nitrostat) 0.4 mg SL tablet Place 1 tablet (0.4 mg) under the tongue every 5 minutes if needed for chest pain. DISSOLVE 1 TABLET UNDER THE TONGUE AS NEEDED FOR CHEST PAIN- MAY REPEAT EVERY 5 MINUTES IF NEEDED (MAX 3 DOSES.- IF NO RELIEF CALL 911)      omeprazole OTC (PriLOSEC OTC) 20 mg EC tablet Take 1 tablet (20 mg) by mouth once daily as needed (acid reflux). Do not crush, chew, or split.      prasugrel (Effient) 10 mg tablet Take 6 tablets at once on Day 1, then take 1 tablet daily 90 tablet 3    predniSONE (Deltasone) 10 mg tablet 4 tabs po every day x3 days, then 3 tabs po every day x3 days, then 2 tabs po every day x3 days, then 1 tab po every day x3 days 30 tablet 0    simethicone (Gas-Ex) 125 mg tablet tablet Take 1 tablet (125 mg) by mouth every 6 hours if needed (for gas, bloating and stomach upset). 30 tablet 0    vitamin B complex tablet Take 1 tablet by mouth once daily.       No current facility-administered medications for this visit.   [3]   Patient Active Problem List  Diagnosis    Bilateral high frequency sensorineural hearing loss    BPH (benign prostatic hyperplasia)    CAD S/P percutaneous coronary angioplasty    Chronic obstructive pulmonary disease (Multi)    Diverticulosis of large intestine without hemorrhage    HTN (hypertension), benign    GERD (gastroesophageal reflux disease)    Headache    History of MI (myocardial infarction)    Lumbar spondylosis    Mixed hyperlipidemia    S/P TAVR (transcatheter aortic valve replacement)    Xerosis of skin    Pain in toes of both feet    Onychomycosis    BPV (benign positional vertigo), unspecified laterality    Endolymphatic hydrops of both ears    Former smoker    Primary osteoarthritis involving multiple joints    Chest pain, unspecified type    NSTEMI (non-ST elevated  myocardial infarction) (Multi)    BMI 31.0-31.9,adult    Bursitis of left hip   [4]   Past Surgical History:  Procedure Laterality Date    CARDIAC CATHETERIZATION N/A 3/10/2025    Procedure: Left Heart Cath, No LV;  Surgeon: Antoine Harrison MD;  Location: [facility];  Service: Cardiovascular;  Laterality: N/A;    CARDIAC CATHETERIZATION N/A 3/10/2025    Procedure: PCI FROYLAN Stent- Coronary;  Surgeon: Antoine Harrison MD;  Location: [facility];  Service: Cardiovascular;  Laterality: N/A;    OTHER SURGICAL HISTORY  10/27/2020    Cholecystectomy    OTHER SURGICAL HISTORY  10/27/2020    Cervical laminectomy    OTHER SURGICAL HISTORY  10/27/2020    Colonoscopy    OTHER SURGICAL HISTORY  04/13/2022    Percutaneous transluminal coronary angioplasty    OTHER SURGICAL HISTORY  04/13/2022    Cardiac catheterization with stent placement    OTHER SURGICAL HISTORY  04/13/2022    Tonsillectomy with adenoidectomy

## 2025-08-08 ENCOUNTER — APPOINTMENT (OUTPATIENT)
Dept: PRIMARY CARE | Facility: CLINIC | Age: 65
End: 2025-08-08
Payer: COMMERCIAL

## 2025-08-08 VITALS
HEART RATE: 75 BPM | SYSTOLIC BLOOD PRESSURE: 154 MMHG | DIASTOLIC BLOOD PRESSURE: 95 MMHG | TEMPERATURE: 97.6 F | WEIGHT: 211 LBS | BODY MASS INDEX: 32.08 KG/M2 | OXYGEN SATURATION: 95 %

## 2025-08-08 DIAGNOSIS — Z98.61 CAD S/P PERCUTANEOUS CORONARY ANGIOPLASTY: Primary | ICD-10-CM

## 2025-08-08 DIAGNOSIS — K21.9 GASTROESOPHAGEAL REFLUX DISEASE WITHOUT ESOPHAGITIS: ICD-10-CM

## 2025-08-08 DIAGNOSIS — Z11.59 NEED FOR HEPATITIS C SCREENING TEST: ICD-10-CM

## 2025-08-08 DIAGNOSIS — Z12.5 SCREENING FOR PROSTATE CANCER: ICD-10-CM

## 2025-08-08 DIAGNOSIS — Z78.9 STATIN INTOLERANCE: ICD-10-CM

## 2025-08-08 DIAGNOSIS — Z95.2 S/P TAVR (TRANSCATHETER AORTIC VALVE REPLACEMENT): ICD-10-CM

## 2025-08-08 DIAGNOSIS — I25.10 CAD S/P PERCUTANEOUS CORONARY ANGIOPLASTY: Primary | ICD-10-CM

## 2025-08-08 DIAGNOSIS — Z00.00 ROUTINE GENERAL MEDICAL EXAMINATION AT HEALTH CARE FACILITY: ICD-10-CM

## 2025-08-08 PROBLEM — I25.2 HISTORY OF MI (MYOCARDIAL INFARCTION): Status: RESOLVED | Noted: 2023-04-26 | Resolved: 2025-08-08

## 2025-08-08 PROBLEM — Z87.891 FORMER SMOKER: Status: RESOLVED | Noted: 2023-10-19 | Resolved: 2025-08-08

## 2025-08-08 PROBLEM — R07.9 CHEST PAIN, UNSPECIFIED TYPE: Status: RESOLVED | Noted: 2025-03-10 | Resolved: 2025-08-08

## 2025-08-08 PROCEDURE — 1170F FXNL STATUS ASSESSED: CPT | Performed by: FAMILY MEDICINE

## 2025-08-08 PROCEDURE — 1160F RVW MEDS BY RX/DR IN RCRD: CPT | Performed by: FAMILY MEDICINE

## 2025-08-08 PROCEDURE — 1158F ADVNC CARE PLAN TLK DOCD: CPT | Performed by: FAMILY MEDICINE

## 2025-08-08 PROCEDURE — 1159F MED LIST DOCD IN RCRD: CPT | Performed by: FAMILY MEDICINE

## 2025-08-08 PROCEDURE — 3080F DIAST BP >= 90 MM HG: CPT | Performed by: FAMILY MEDICINE

## 2025-08-08 PROCEDURE — 3077F SYST BP >= 140 MM HG: CPT | Performed by: FAMILY MEDICINE

## 2025-08-08 PROCEDURE — G0439 PPPS, SUBSEQ VISIT: HCPCS | Performed by: FAMILY MEDICINE

## 2025-08-08 RX ORDER — NITROGLYCERIN 0.4 MG/1
0.4 TABLET SUBLINGUAL EVERY 5 MIN PRN
Qty: 90 TABLET | Refills: 2 | Status: SHIPPED | OUTPATIENT
Start: 2025-08-08

## 2025-08-08 ASSESSMENT — ENCOUNTER SYMPTOMS
ABDOMINAL PAIN: 1
BELCHING: 1

## 2025-08-08 ASSESSMENT — ACTIVITIES OF DAILY LIVING (ADL)
TAKING_MEDICATION: INDEPENDENT
GROCERY_SHOPPING: INDEPENDENT
DOING_HOUSEWORK: INDEPENDENT
BATHING: INDEPENDENT
DRESSING: INDEPENDENT
MANAGING_FINANCES: INDEPENDENT

## 2025-08-08 NOTE — ASSESSMENT & PLAN NOTE
He continue secondary protection with beta-blocker, aspirin, Plavix.  Blood pressure is elevated today but he is in pain from his shoulder and his acid reflux which may be causing this.  Interval readings have been normal  Orders:    nitroglycerin (Nitrostat) 0.4 mg SL tablet; Place 1 tablet (0.4 mg) under the tongue every 5 minutes if needed for chest pain. DISSOLVE 1 TABLET UNDER THE TONGUE AS NEEDED FOR CHEST PAIN- MAY REPEAT EVERY 5 MINUTES IF NEEDED (MAX 3 DOSES.- IF NO RELIEF CALL 911)

## 2025-08-08 NOTE — ASSESSMENT & PLAN NOTE
He is having severe breakthrough symptoms of burning, dyspepsia, regurgitation, and belching despite taking high dose PPI and Tums throughout the day.  I suspect that there may be a degree of gastritis, H. pylori, or peptic ulcer disease.  He has an appointment with GI but it is not for another 2 months and will see if there is any way to get a more urgent appointment before he develops bleeding.  We will also check a CBC To ensure that he is not developing anemia  Orders:    Referral to Gastroenterology; Future    CBC; Future

## 2025-08-08 NOTE — PROGRESS NOTES
Subjective   Emiliano Mann is a 65 y.o. male who presents for Annual Exam, Shoulder Pain (Left shoulder pain/), and Abdominal Pain (Pt has stomach and esophagus problems. /).     Shoulder Pain     Abdominal Pain  The pain is at a severity of 9/10. Associated symptoms include belching.   On further questioning his abdominal pain is really dyspepsia.  He is belching repeatedly throughout the night.  He has severe burning after meals.  He is taking a PPI and eating Tums like candy and still having significant breakthrough pain that is making him miserable.       Review of Systems   Gastrointestinal:  Positive for abdominal pain.      Visit Vitals  BP (!) 154/95   Pulse 75   Temp 36.4 °C (97.6 °F) (Temporal)      Objective   Physical Exam  Constitutional:       Appearance: Normal appearance.   HENT:      Head: Normocephalic.     Eyes:      Conjunctiva/sclera: Conjunctivae normal.       Cardiovascular:      Rate and Rhythm: Normal rate and regular rhythm.      Heart sounds: Normal heart sounds.   Pulmonary:      Effort: Pulmonary effort is normal.      Breath sounds: Normal breath sounds.     Musculoskeletal:      Cervical back: Neck supple.     Skin:     General: Skin is warm and dry.     Neurological:      Mental Status: He is alert.       No data recorded     No data recorded   Patient Health Questionnaire-2 Score: 0 (8/8/2025  1:52 PM)   Assessment & Plan  Routine general medical examination at health care facility  Physical examination is normal.  Due for PSA screening which will be ordered.  He will be due for colorectal screening in the coming year and has been referred to GI for this.  I recommend continuing to stay up-to-date on influenza and COVID-19 in the fall.  Orders:    1 Year Follow Up In [facility] - PCP - Wellness Exam    1 Year Follow Up [facility] - PCP - Wellness Exam; Future    CAD S/P percutaneous coronary angioplasty  He continue secondary protection with beta-blocker,  aspirin, Plavix.  Blood pressure is elevated today but he is in pain from his shoulder and his acid reflux which may be causing this.  Interval readings have been normal  Orders:    nitroglycerin (Nitrostat) 0.4 mg SL tablet; Place 1 tablet (0.4 mg) under the tongue every 5 minutes if needed for chest pain. DISSOLVE 1 TABLET UNDER THE TONGUE AS NEEDED FOR CHEST PAIN- MAY REPEAT EVERY 5 MINUTES IF NEEDED (MAX 3 DOSES.- IF NO RELIEF CALL 911)    Statin intolerance         S/P TAVR (transcatheter aortic valve replacement)         Gastroesophageal reflux disease without esophagitis  He is having severe breakthrough symptoms of burning, dyspepsia, regurgitation, and belching despite taking high dose PPI and Tums throughout the day.  I suspect that there may be a degree of gastritis, H. pylori, or peptic ulcer disease.  He has an appointment with GI but it is not for another 2 months and will see if there is any way to get a more urgent appointment before he develops bleeding.  We will also check a CBC To ensure that he is not developing anemia  Orders:    Referral to Gastroenterology; Future    CBC; Future    Need for hepatitis C screening test    Orders:    Hepatitis C Antibody; Future    Screening for prostate cancer    Orders:    PSA screen; Future           Please excuse any errors in grammar or translation related to this dictation. Voice recognition software was utilized to prepare this document.

## 2025-08-12 ENCOUNTER — APPOINTMENT (OUTPATIENT)
Dept: GASTROENTEROLOGY | Facility: CLINIC | Age: 65
End: 2025-08-12
Payer: COMMERCIAL

## 2025-08-12 VITALS
WEIGHT: 212.7 LBS | RESPIRATION RATE: 18 BRPM | BODY MASS INDEX: 32.24 KG/M2 | DIASTOLIC BLOOD PRESSURE: 96 MMHG | SYSTOLIC BLOOD PRESSURE: 150 MMHG | HEART RATE: 72 BPM | HEIGHT: 68 IN | OXYGEN SATURATION: 96 %

## 2025-08-12 DIAGNOSIS — R10.13 EPIGASTRIC PAIN: ICD-10-CM

## 2025-08-12 DIAGNOSIS — R14.2 BELCHING: ICD-10-CM

## 2025-08-12 DIAGNOSIS — Z87.19 HISTORY OF ESOPHAGEAL STRICTURE: ICD-10-CM

## 2025-08-12 DIAGNOSIS — K21.9 GASTROESOPHAGEAL REFLUX DISEASE, UNSPECIFIED WHETHER ESOPHAGITIS PRESENT: Primary | ICD-10-CM

## 2025-08-12 PROCEDURE — 3080F DIAST BP >= 90 MM HG: CPT | Performed by: NURSE PRACTITIONER

## 2025-08-12 PROCEDURE — 1159F MED LIST DOCD IN RCRD: CPT | Performed by: NURSE PRACTITIONER

## 2025-08-12 PROCEDURE — 3077F SYST BP >= 140 MM HG: CPT | Performed by: NURSE PRACTITIONER

## 2025-08-12 PROCEDURE — 99203 OFFICE O/P NEW LOW 30 MIN: CPT | Performed by: NURSE PRACTITIONER

## 2025-08-12 PROCEDURE — 1160F RVW MEDS BY RX/DR IN RCRD: CPT | Performed by: NURSE PRACTITIONER

## 2025-08-12 PROCEDURE — 3008F BODY MASS INDEX DOCD: CPT | Performed by: NURSE PRACTITIONER

## 2025-08-12 RX ORDER — SUCRALFATE 1 G/1
1 TABLET ORAL NIGHTLY
Qty: 30 TABLET | Refills: 0 | Status: SHIPPED | OUTPATIENT
Start: 2025-08-12 | End: 2025-08-14 | Stop reason: SDUPTHER

## 2025-08-12 RX ORDER — PANTOPRAZOLE SODIUM 40 MG/1
40 TABLET, DELAYED RELEASE ORAL
Qty: 180 TABLET | Refills: 3 | Status: SHIPPED | OUTPATIENT
Start: 2025-08-12 | End: 2026-08-12

## 2025-08-14 ENCOUNTER — TELEPHONE (OUTPATIENT)
Dept: GASTROENTEROLOGY | Facility: CLINIC | Age: 65
End: 2025-08-14
Payer: COMMERCIAL

## 2025-08-14 DIAGNOSIS — K21.9 GASTROESOPHAGEAL REFLUX DISEASE, UNSPECIFIED WHETHER ESOPHAGITIS PRESENT: ICD-10-CM

## 2025-08-14 RX ORDER — SUCRALFATE 1 G/1
1 TABLET ORAL NIGHTLY
Qty: 30 TABLET | Refills: 0 | Status: SHIPPED | OUTPATIENT
Start: 2025-08-14 | End: 2025-09-13

## 2025-08-15 ENCOUNTER — TELEPHONE (OUTPATIENT)
Dept: GASTROENTEROLOGY | Facility: CLINIC | Age: 65
End: 2025-08-15
Payer: COMMERCIAL

## 2025-08-15 ENCOUNTER — TELEPHONE (OUTPATIENT)
Dept: CARDIOLOGY | Facility: CLINIC | Age: 65
End: 2025-08-15
Payer: COMMERCIAL

## 2025-08-27 ENCOUNTER — APPOINTMENT (OUTPATIENT)
Dept: GASTROENTEROLOGY | Facility: HOSPITAL | Age: 65
End: 2025-08-27
Payer: COMMERCIAL

## 2025-09-18 ENCOUNTER — APPOINTMENT (OUTPATIENT)
Dept: GASTROENTEROLOGY | Facility: CLINIC | Age: 65
End: 2025-09-18
Payer: COMMERCIAL

## 2025-10-15 ENCOUNTER — APPOINTMENT (OUTPATIENT)
Dept: GASTROENTEROLOGY | Facility: CLINIC | Age: 65
End: 2025-10-15
Payer: COMMERCIAL

## 2025-10-20 ENCOUNTER — APPOINTMENT (OUTPATIENT)
Dept: CARDIOLOGY | Facility: CLINIC | Age: 65
End: 2025-10-20
Payer: MEDICARE

## 2026-03-23 ENCOUNTER — APPOINTMENT (OUTPATIENT)
Dept: CARDIOLOGY | Facility: CLINIC | Age: 66
End: 2026-03-23
Payer: MEDICARE

## 2026-08-10 ENCOUNTER — APPOINTMENT (OUTPATIENT)
Dept: PRIMARY CARE | Facility: CLINIC | Age: 66
End: 2026-08-10
Payer: COMMERCIAL

## (undated) DEVICE — GUIDEWIRE, UNIVERSAL BALANCE MID WEIGHT, 190CM, STR

## (undated) DEVICE — SHEATH, PINNACLE, W/.038 GUIDEWIRE, 10 CM,  6FR INTRODUCER, 6FR DIA, 2.5 CM DIALATOR

## (undated) DEVICE — TUBING, MANIFOLD, LOW PRESSURE

## (undated) DEVICE — BANDAGE, QUIKCLOT, INTERVENTIONAL HEMO, W/O SLIT

## (undated) DEVICE — CATHETER, INFINITI DIAGNOSTIC, 5 FR 100CM 3DRC, WILLIAMS RIGHT OR NO TORQUE

## (undated) DEVICE — CATHETER, DIAGNOSTIC, JUDKINS, LEFT, 5 FR-JL 4.0

## (undated) DEVICE — DEVICE, INFLATION, BASIXSKY, 30ATM BAR 20ML, MAP802 PHD, INSERT TOOL TORQUE, METAL

## (undated) DEVICE — Device

## (undated) DEVICE — DEVICE, TORQUE, 0.009 - 0.018IN

## (undated) DEVICE — CLOSURE SYSTEM, VASCULAR, VASCADE 6/7F VCS

## (undated) DEVICE — SHEATH, PINNACLE, W/.038 GW 10CM, 5FR INTRODUCER, 2.5 CM DIALATOR

## (undated) DEVICE — CATHETER, BALLOON DILATION, EUPHORA SEMICOMPLIANT 3.00  X 20 MM X 142CM